# Patient Record
Sex: MALE | Race: BLACK OR AFRICAN AMERICAN | NOT HISPANIC OR LATINO | URBAN - METROPOLITAN AREA
[De-identification: names, ages, dates, MRNs, and addresses within clinical notes are randomized per-mention and may not be internally consistent; named-entity substitution may affect disease eponyms.]

---

## 2017-06-01 ENCOUNTER — OUTPATIENT (OUTPATIENT)
Dept: OUTPATIENT SERVICES | Facility: HOSPITAL | Age: 67
LOS: 1 days | End: 2017-06-01
Payer: MEDICAID

## 2017-06-12 DIAGNOSIS — R69 ILLNESS, UNSPECIFIED: ICD-10-CM

## 2017-08-01 PROCEDURE — G9001: CPT

## 2023-04-04 ENCOUNTER — INPATIENT (INPATIENT)
Facility: HOSPITAL | Age: 73
LOS: 13 days | Discharge: HOME | DRG: 252 | End: 2023-04-18
Attending: HOSPITALIST | Admitting: INTERNAL MEDICINE
Payer: MEDICARE

## 2023-04-04 VITALS
OXYGEN SATURATION: 96 % | HEART RATE: 106 BPM | DIASTOLIC BLOOD PRESSURE: 76 MMHG | RESPIRATION RATE: 16 BRPM | SYSTOLIC BLOOD PRESSURE: 134 MMHG | TEMPERATURE: 98 F

## 2023-04-04 DIAGNOSIS — K08.9 DISORDER OF TEETH AND SUPPORTING STRUCTURES, UNSPECIFIED: ICD-10-CM

## 2023-04-04 DIAGNOSIS — I73.9 PERIPHERAL VASCULAR DISEASE, UNSPECIFIED: ICD-10-CM

## 2023-04-04 DIAGNOSIS — I25.10 ATHEROSCLEROTIC HEART DISEASE OF NATIVE CORONARY ARTERY WITHOUT ANGINA PECTORIS: ICD-10-CM

## 2023-04-04 DIAGNOSIS — E11.9 TYPE 2 DIABETES MELLITUS WITHOUT COMPLICATIONS: ICD-10-CM

## 2023-04-04 DIAGNOSIS — Z95.5 PRESENCE OF CORONARY ANGIOPLASTY IMPLANT AND GRAFT: Chronic | ICD-10-CM

## 2023-04-04 DIAGNOSIS — Z29.9 ENCOUNTER FOR PROPHYLACTIC MEASURES, UNSPECIFIED: ICD-10-CM

## 2023-04-04 DIAGNOSIS — N18.4 CHRONIC KIDNEY DISEASE, STAGE 4 (SEVERE): ICD-10-CM

## 2023-04-04 DIAGNOSIS — M86.60 OTHER CHRONIC OSTEOMYELITIS, UNSPECIFIED SITE: ICD-10-CM

## 2023-04-04 DIAGNOSIS — I73.9 PERIPHERAL VASCULAR DISEASE, UNSPECIFIED: Chronic | ICD-10-CM

## 2023-04-04 LAB
ALBUMIN SERPL ELPH-MCNC: 3.2 G/DL — LOW (ref 3.3–5)
ALP SERPL-CCNC: 85 U/L — SIGNIFICANT CHANGE UP (ref 40–120)
ALT FLD-CCNC: <5 U/L — LOW (ref 10–45)
ANION GAP SERPL CALC-SCNC: 11 MMOL/L — SIGNIFICANT CHANGE UP (ref 5–17)
APPEARANCE UR: CLEAR — SIGNIFICANT CHANGE UP
APTT BLD: 28.3 SEC — SIGNIFICANT CHANGE UP (ref 27.5–35.5)
AST SERPL-CCNC: 12 U/L — SIGNIFICANT CHANGE UP (ref 10–40)
BACTERIA # UR AUTO: NEGATIVE — SIGNIFICANT CHANGE UP
BASOPHILS # BLD AUTO: 0.06 K/UL — SIGNIFICANT CHANGE UP (ref 0–0.2)
BASOPHILS NFR BLD AUTO: 0.5 % — SIGNIFICANT CHANGE UP (ref 0–2)
BILIRUB SERPL-MCNC: 0.3 MG/DL — SIGNIFICANT CHANGE UP (ref 0.2–1.2)
BILIRUB UR-MCNC: NEGATIVE — SIGNIFICANT CHANGE UP
BUN SERPL-MCNC: 45 MG/DL — HIGH (ref 7–23)
CALCIUM SERPL-MCNC: 8.8 MG/DL — SIGNIFICANT CHANGE UP (ref 8.4–10.5)
CHLORIDE SERPL-SCNC: 106 MMOL/L — SIGNIFICANT CHANGE UP (ref 96–108)
CO2 SERPL-SCNC: 21 MMOL/L — LOW (ref 22–31)
COLOR SPEC: SIGNIFICANT CHANGE UP
CREAT SERPL-MCNC: 2.64 MG/DL — HIGH (ref 0.5–1.3)
DIFF PNL FLD: NEGATIVE — SIGNIFICANT CHANGE UP
EGFR: 25 ML/MIN/1.73M2 — LOW
EOSINOPHIL # BLD AUTO: 0.11 K/UL — SIGNIFICANT CHANGE UP (ref 0–0.5)
EOSINOPHIL NFR BLD AUTO: 1 % — SIGNIFICANT CHANGE UP (ref 0–6)
EPI CELLS # UR: 0 /HPF — SIGNIFICANT CHANGE UP
GLUCOSE BLDC GLUCOMTR-MCNC: 156 MG/DL — HIGH (ref 70–99)
GLUCOSE BLDC GLUCOMTR-MCNC: 215 MG/DL — HIGH (ref 70–99)
GLUCOSE SERPL-MCNC: 114 MG/DL — HIGH (ref 70–99)
GLUCOSE UR QL: NEGATIVE — SIGNIFICANT CHANGE UP
HCT VFR BLD CALC: 26.1 % — LOW (ref 39–50)
HGB BLD-MCNC: 8.9 G/DL — LOW (ref 13–17)
IMM GRANULOCYTES NFR BLD AUTO: 0.5 % — SIGNIFICANT CHANGE UP (ref 0–0.9)
INR BLD: 1.25 RATIO — HIGH (ref 0.88–1.16)
KETONES UR-MCNC: NEGATIVE — SIGNIFICANT CHANGE UP
LEUKOCYTE ESTERASE UR-ACNC: NEGATIVE — SIGNIFICANT CHANGE UP
LYMPHOCYTES # BLD AUTO: 17.7 % — SIGNIFICANT CHANGE UP (ref 13–44)
LYMPHOCYTES # BLD AUTO: 2.01 K/UL — SIGNIFICANT CHANGE UP (ref 1–3.3)
MCHC RBC-ENTMCNC: 26.6 PG — LOW (ref 27–34)
MCHC RBC-ENTMCNC: 34.1 GM/DL — SIGNIFICANT CHANGE UP (ref 32–36)
MCV RBC AUTO: 78.1 FL — LOW (ref 80–100)
MONOCYTES # BLD AUTO: 0.92 K/UL — HIGH (ref 0–0.9)
MONOCYTES NFR BLD AUTO: 8.1 % — SIGNIFICANT CHANGE UP (ref 2–14)
NEUTROPHILS # BLD AUTO: 8.19 K/UL — HIGH (ref 1.8–7.4)
NEUTROPHILS NFR BLD AUTO: 72.2 % — SIGNIFICANT CHANGE UP (ref 43–77)
NITRITE UR-MCNC: NEGATIVE — SIGNIFICANT CHANGE UP
NRBC # BLD: 0 /100 WBCS — SIGNIFICANT CHANGE UP (ref 0–0)
PH UR: 6 — SIGNIFICANT CHANGE UP (ref 5–8)
PLATELET # BLD AUTO: 279 K/UL — SIGNIFICANT CHANGE UP (ref 150–400)
POTASSIUM SERPL-MCNC: 4.2 MMOL/L — SIGNIFICANT CHANGE UP (ref 3.5–5.3)
POTASSIUM SERPL-SCNC: 4.2 MMOL/L — SIGNIFICANT CHANGE UP (ref 3.5–5.3)
PROT SERPL-MCNC: 7.1 G/DL — SIGNIFICANT CHANGE UP (ref 6–8.3)
PROT UR-MCNC: ABNORMAL
PROTHROM AB SERPL-ACNC: 14.4 SEC — HIGH (ref 10.5–13.4)
RBC # BLD: 3.34 M/UL — LOW (ref 4.2–5.8)
RBC # FLD: 16.2 % — HIGH (ref 10.3–14.5)
RBC CASTS # UR COMP ASSIST: 1 /HPF — SIGNIFICANT CHANGE UP (ref 0–4)
SODIUM SERPL-SCNC: 138 MMOL/L — SIGNIFICANT CHANGE UP (ref 135–145)
SP GR SPEC: 1.01 — SIGNIFICANT CHANGE UP (ref 1.01–1.02)
UROBILINOGEN FLD QL: NEGATIVE — SIGNIFICANT CHANGE UP
WBC # BLD: 11.35 K/UL — HIGH (ref 3.8–10.5)
WBC # FLD AUTO: 11.35 K/UL — HIGH (ref 3.8–10.5)
WBC UR QL: 0 /HPF — SIGNIFICANT CHANGE UP (ref 0–5)

## 2023-04-04 PROCEDURE — 71045 X-RAY EXAM CHEST 1 VIEW: CPT | Mod: 26

## 2023-04-04 PROCEDURE — 99222 1ST HOSP IP/OBS MODERATE 55: CPT | Mod: 57

## 2023-04-04 PROCEDURE — 93010 ELECTROCARDIOGRAM REPORT: CPT

## 2023-04-04 PROCEDURE — 99223 1ST HOSP IP/OBS HIGH 75: CPT

## 2023-04-04 PROCEDURE — 73630 X-RAY EXAM OF FOOT: CPT | Mod: 26,50

## 2023-04-04 RX ORDER — INSULIN LISPRO 100/ML
VIAL (ML) SUBCUTANEOUS AT BEDTIME
Refills: 0 | Status: DISCONTINUED | OUTPATIENT
Start: 2023-04-04 | End: 2023-04-11

## 2023-04-04 RX ORDER — SODIUM CHLORIDE 9 MG/ML
1000 INJECTION, SOLUTION INTRAVENOUS
Refills: 0 | Status: DISCONTINUED | OUTPATIENT
Start: 2023-04-04 | End: 2023-04-12

## 2023-04-04 RX ORDER — CILOSTAZOL 100 MG/1
100 TABLET ORAL
Refills: 0 | Status: DISCONTINUED | OUTPATIENT
Start: 2023-04-04 | End: 2023-04-12

## 2023-04-04 RX ORDER — DEXTROSE 50 % IN WATER 50 %
15 SYRINGE (ML) INTRAVENOUS ONCE
Refills: 0 | Status: DISCONTINUED | OUTPATIENT
Start: 2023-04-04 | End: 2023-04-12

## 2023-04-04 RX ORDER — DEXTROSE 50 % IN WATER 50 %
12.5 SYRINGE (ML) INTRAVENOUS ONCE
Refills: 0 | Status: DISCONTINUED | OUTPATIENT
Start: 2023-04-04 | End: 2023-04-12

## 2023-04-04 RX ORDER — AZTREONAM 2 G
1000 VIAL (EA) INJECTION ONCE
Refills: 0 | Status: COMPLETED | OUTPATIENT
Start: 2023-04-04 | End: 2023-04-04

## 2023-04-04 RX ORDER — INSULIN LISPRO 100/ML
VIAL (ML) SUBCUTANEOUS
Refills: 0 | Status: DISCONTINUED | OUTPATIENT
Start: 2023-04-04 | End: 2023-04-11

## 2023-04-04 RX ORDER — ISOSORBIDE MONONITRATE 60 MG/1
30 TABLET, EXTENDED RELEASE ORAL DAILY
Refills: 0 | Status: DISCONTINUED | OUTPATIENT
Start: 2023-04-05 | End: 2023-04-12

## 2023-04-04 RX ORDER — GLUCAGON INJECTION, SOLUTION 0.5 MG/.1ML
1 INJECTION, SOLUTION SUBCUTANEOUS ONCE
Refills: 0 | Status: DISCONTINUED | OUTPATIENT
Start: 2023-04-04 | End: 2023-04-12

## 2023-04-04 RX ORDER — CLOPIDOGREL BISULFATE 75 MG/1
75 TABLET, FILM COATED ORAL DAILY
Refills: 0 | Status: DISCONTINUED | OUTPATIENT
Start: 2023-04-05 | End: 2023-04-12

## 2023-04-04 RX ORDER — AZTREONAM 2 G
VIAL (EA) INJECTION
Refills: 0 | Status: DISCONTINUED | OUTPATIENT
Start: 2023-04-04 | End: 2023-04-04

## 2023-04-04 RX ORDER — PANTOPRAZOLE SODIUM 20 MG/1
1 TABLET, DELAYED RELEASE ORAL
Refills: 0 | DISCHARGE

## 2023-04-04 RX ORDER — INSULIN LISPRO 100/ML
1 VIAL (ML) SUBCUTANEOUS ONCE
Refills: 0 | Status: COMPLETED | OUTPATIENT
Start: 2023-04-04 | End: 2023-04-04

## 2023-04-04 RX ORDER — HEPARIN SODIUM 5000 [USP'U]/ML
5000 INJECTION INTRAVENOUS; SUBCUTANEOUS EVERY 8 HOURS
Refills: 0 | Status: DISCONTINUED | OUTPATIENT
Start: 2023-04-04 | End: 2023-04-12

## 2023-04-04 RX ORDER — VANCOMYCIN HCL 1 G
1000 VIAL (EA) INTRAVENOUS ONCE
Refills: 0 | Status: COMPLETED | OUTPATIENT
Start: 2023-04-04 | End: 2023-04-04

## 2023-04-04 RX ORDER — ACETAMINOPHEN 500 MG
650 TABLET ORAL EVERY 6 HOURS
Refills: 0 | Status: DISCONTINUED | OUTPATIENT
Start: 2023-04-04 | End: 2023-04-12

## 2023-04-04 RX ORDER — DEXTROSE 50 % IN WATER 50 %
25 SYRINGE (ML) INTRAVENOUS ONCE
Refills: 0 | Status: DISCONTINUED | OUTPATIENT
Start: 2023-04-04 | End: 2023-04-12

## 2023-04-04 RX ORDER — NIFEDIPINE 30 MG
30 TABLET, EXTENDED RELEASE 24 HR ORAL DAILY
Refills: 0 | Status: DISCONTINUED | OUTPATIENT
Start: 2023-04-05 | End: 2023-04-11

## 2023-04-04 RX ADMIN — Medication 1 UNIT(S): at 17:22

## 2023-04-04 RX ADMIN — Medication 250 MILLIGRAM(S): at 23:52

## 2023-04-04 RX ADMIN — HEPARIN SODIUM 5000 UNIT(S): 5000 INJECTION INTRAVENOUS; SUBCUTANEOUS at 22:49

## 2023-04-04 RX ADMIN — Medication 50 MILLIGRAM(S): at 22:49

## 2023-04-04 NOTE — CONSULT NOTE ADULT - SUBJECTIVE AND OBJECTIVE BOX
73 y/o M--patient with a history of type 2 DM complicated with CKD4, PAD with unclear past stenting hx in the RLE, essential HTN, active cigarette use until one week ago,  transferred from [facility] 3/30/23 where he was initially admitted with mild cognitive impairment, with spouse reporting the patient had lost his R hallux toenail about a week prior to that admission and noted dark discoloration of the R hallux and 2nd toe with foul odor emanating from the R foot.   Patient also with L hallux discoloration.   Patient denies pain at rest.   Patient was seen by podiatry, vascular, nephrology, cardiology, and ID at [facility].   Patient initially on Azactam and IV vancomycin but transferred to doxycycline PO. Patient was evaluated at [facility] and was deemed to be a high operative risk by Cardiology and transferred to [place] per Dr. Munson for possible Vascular Cardiology intervention.      Patient afebrile, WBC 11.    Vascular Surgery consulted for evaluation of b/l foot wounds.    Vital Signs Last 24 Hrs  T(C): 36.6 (04 Apr 2023 16:50), Max: 36.6 (04 Apr 2023 16:50)  T(F): 97.8 (04 Apr 2023 16:50), Max: 97.8 (04 Apr 2023 16:50)  HR: 106 (04 Apr 2023 16:50) (106 - 106)  BP: 134/76 (04 Apr 2023 16:50) (134/76 - 134/76)  BP(mean): --  RR: 16 (04 Apr 2023 16:50) (16 - 16)  SpO2: 96% (04 Apr 2023 16:50) (96% - 96%)    Parameters below as of 04 Apr 2023 16:50  Patient On (Oxygen Delivery Method): room air    Physical exam  General: NAD  Cardiac: Regular rate  Respiratory: Nonlabored breathing  Abdominal Exam: soft, nondistended, nontender  Vascular: Palpable femoral pulses b/l, DP/PT signals on the RLE, PT signals on the LLE. LEFT hallux with ulcer, poor nail hygiene.  RIGHT hallux and 2nd toe with foul smelling odor with dry gangrene. R foot dorsal wound with eschar. no purulent drainage.    LABS:                            8.9    11.35 )-----------( 279      ( 04 Apr 2023 19:14 )             26.1     04-04    138  |  106  |  45<H>  ----------------------------<  114<H>  4.2   |  21<L>  |  2.64<H>    Ca    8.8      04 Apr 2023 19:14    TPro  7.1  /  Alb  3.2<L>  /  TBili  0.3  /  DBili  x   /  AST  12  /  ALT  <5<L>  /  AlkPhos  85  04-04    PT/INR - ( 04 Apr 2023 19:14 )   PT: 14.4 sec;   INR: 1.25 ratio         PTT - ( 04 Apr 2023 19:14 )  PTT:28.3 sec

## 2023-04-04 NOTE — H&P ADULT - NSHPSOURCEINFOTX_GEN_ALL_CORE
Patient's spouse, Dora Suman, [phone] updated, and patient's adult daughter, Teresita Suman, [phone] updated with patient's permission.

## 2023-04-04 NOTE — H&P ADULT - NSHPLABSRESULTS_GEN_ALL_CORE
EKG tracing interpreted by me with sinus tachycardia at 107.    Chest radiograph interpreted by me with no acute infiltrate or effusion.    WBC 11.3  72N    Hgb 8.9  (9.2 at [facility])    Platelets of 279K    INR 1.25    Random glucose of 114.  Cr 2.6  (Cr 2.4 at [facility])    K+ 4.2    Alb 3.2

## 2023-04-04 NOTE — CONSULT NOTE ADULT - SUBJECTIVE AND OBJECTIVE BOX
Patient is a 72y old  Male who presents with a chief complaint of Transferred from [facility] for possible vascular intervention. (04 Apr 2023 21:10)      HPI:  Transfer from [facility] above for this 73 y/o M--patient with a history of type 2 DM complicated with CKD4, PAD with past stenting RLE, essential HTN, apparently still active cigarette use until one week ago, UNVACCINATED against COVID-19, with initial admission to [facility] 3/30/23 in the setting of patient with mild cognitive impairment, with spouse reporting the patient had lost his RIGHT hallux toenail about a week prior to that admission and noted dark discoloration of the RIGHT hallux and 2nd toe with foul odor emanating from the RIGHT foot.   Patient also with LEFT hallux discoloration.   Patient denies foot or limb pain.   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   Patient initially on Azactam and IV vancomycin but transferred to doxycycline PO.   [place] at [facility] to be a high operative risk with evaluation by cardiology and transferred to [place] per Dr. Munson for possible Vascular Cardiology intervention.   (04 Apr 2023 19:23)      PAST MEDICAL & SURGICAL HISTORY:  Essential hypertension      Hyperlipidemia, unspecified hyperlipidemia type      CAD (coronary artery disease)      PVD (peripheral vascular disease) with claudication      Stented coronary artery      Type 2 diabetes mellitus      Stage 4 chronic kidney disease      COVID-19 vaccination refused      Stented coronary artery      PAD (peripheral artery disease)          MEDICATIONS  (STANDING):  aztreonam  IVPB 1000 milliGRAM(s) IV Intermittent once  cilostazol 100 milliGRAM(s) Oral two times a day  dextrose 5%. 1000 milliLiter(s) (100 mL/Hr) IV Continuous <Continuous>  dextrose 5%. 1000 milliLiter(s) (50 mL/Hr) IV Continuous <Continuous>  dextrose 50% Injectable 25 Gram(s) IV Push once  dextrose 50% Injectable 12.5 Gram(s) IV Push once  dextrose 50% Injectable 25 Gram(s) IV Push once  glucagon  Injectable 1 milliGRAM(s) IntraMuscular once  heparin   Injectable 5000 Unit(s) SubCutaneous every 8 hours  insulin lispro (ADMELOG) corrective regimen sliding scale   SubCutaneous three times a day before meals  insulin lispro (ADMELOG) corrective regimen sliding scale   SubCutaneous at bedtime    MEDICATIONS  (PRN):  acetaminophen     Tablet .. 650 milliGRAM(s) Oral every 6 hours PRN Temp greater or equal to 38C (100.4F), Mild Pain (1 - 3)  dextrose Oral Gel 15 Gram(s) Oral once PRN Blood Glucose LESS THAN 70 milliGRAM(s)/deciliter      Allergies    penicillin (Unknown)    Intolerances        VITALS:    Vital Signs Last 24 Hrs  T(C): 37.2 (04 Apr 2023 21:23), Max: 37.2 (04 Apr 2023 21:23)  T(F): 99 (04 Apr 2023 21:23), Max: 99 (04 Apr 2023 21:23)  HR: 107 (04 Apr 2023 21:23) (106 - 107)  BP: 156/73 (04 Apr 2023 21:23) (134/76 - 156/73)  BP(mean): --  RR: 16 (04 Apr 2023 21:23) (16 - 16)  SpO2: 99% (04 Apr 2023 21:23) (96% - 99%)    Parameters below as of 04 Apr 2023 21:23  Patient On (Oxygen Delivery Method): room air        LABS:                          8.9    11.35 )-----------( 279      ( 04 Apr 2023 19:14 )             26.1       04-04    138  |  106  |  45<H>  ----------------------------<  114<H>  4.2   |  21<L>  |  2.64<H>    Ca    8.8      04 Apr 2023 19:14    TPro  7.1  /  Alb  3.2<L>  /  TBili  0.3  /  DBili  x   /  AST  12  /  ALT  <5<L>  /  AlkPhos  85  04-04      CAPILLARY BLOOD GLUCOSE      POCT Blood Glucose.: 156 mg/dL (04 Apr 2023 22:01)  POCT Blood Glucose.: 215 mg/dL (04 Apr 2023 16:29)      PT/INR - ( 04 Apr 2023 19:14 )   PT: 14.4 sec;   INR: 1.25 ratio         PTT - ( 04 Apr 2023 19:14 )  PTT:28.3 sec    LOWER EXTREMITY PHYSICAL EXAM:    Vascular: DP/PT 0/4, B/L, CFT <3 seconds B/L, Temperature gradient warm to cool, B/L.   Neuro: Epicritic sensation diminished to the level of the toes, B/L.  Musculoskeletal/Ortho: Unremarkable.  Skin: Left foot distal lateral hallux wound to bone, fibrotic base with surrounding ischemic changes, mild malodor, no erythema, 2nd digit dry gangrene, ischemic changes to plantar digits 3 and 4. Right foot 2nd and 3rd digit dry gangrene, dorsal medial midfoot dry gangrene, no acute signs of infection.      RADIOLOGY & ADDITIONAL STUDIES:

## 2023-04-04 NOTE — H&P ADULT - NSICDXPASTMEDICALHX_GEN_ALL_CORE_FT
PAST MEDICAL HISTORY:  CAD (coronary artery disease)     Essential hypertension     Hyperlipidemia, unspecified hyperlipidemia type     PVD (peripheral vascular disease) with claudication     Stage 4 chronic kidney disease     Stented coronary artery     Type 2 diabetes mellitus      PAST MEDICAL HISTORY:  CAD (coronary artery disease)     COVID-19 vaccination refused     Essential hypertension     Hyperlipidemia, unspecified hyperlipidemia type     PVD (peripheral vascular disease) with claudication     Stage 4 chronic kidney disease     Stented coronary artery     Type 2 diabetes mellitus

## 2023-04-04 NOTE — CONSULT NOTE ADULT - ASSESSMENT
71 y/o M--patient with a history of type 2 DM complicated with CKD4, PAD with unclear past stenting hx in the RLE, essential HTN, active cigarette use until one week ago,  transferred from [facility] 3/30/23 where he was initially admitted with mild cognitive impairment, with spouse reporting the patient had lost his R hallux toenail about a week prior to that admission and noted dark discoloration of the R hallux and 2nd toe with foul odor emanating from the R foot. Patient afebrile, WBC 11. Vascular Surgery consulted for evaluation of b/l foot wounds.    Plan  - Please obtain b/l LE MILLIE/PVRs with toe pressures  - Please obtain records of outpatient hospital  - Please document Medicine and Cardiology optimization  - Podiatry, Nephrology and ID consults  - Rest of care per primary team   - Plan discussed with Vascular Surgery Fellow on behalf of Dr. Gloria    Vascular Surgery  [id number]

## 2023-04-04 NOTE — H&P ADULT - PROBLEM SELECTOR PLAN 1
See above.   Will resume patient's recent IV Azactam for now.   Would consider formal ID, podiatry evaluation in the AM.   Vascular Cardiology to review planned therapeutic options.

## 2023-04-04 NOTE — H&P ADULT - EXTREMITIES COMMENTS
LEFT hallux with ulcer, poor nail hygiene.  RIGHT hallux with foul smelling odor with dry gangrene with 2nd RIGHT toe.

## 2023-04-04 NOTE — H&P ADULT - NSHPREVIEWOFSYSTEMS_GEN_ALL_CORE
NO chest pain/pressure.  NO palpitations.  NO fever, no chills, no rigors.  NO cough, no dyspnoea.  NO wheezing.  NO abdominal pain, no red blood per rectum or melena.  NO back pain, no tearing back pain.    NO SI/HI>  No thyroid symptoms.  NO dysuria, no hematuria.  NO anorexia.  NO node swelling.    Patient is UNVACCINATED against COVID-19 (patient has refused to date).

## 2023-04-04 NOTE — CONSULT NOTE ADULT - ASSESSMENT
72M presents with bilateral feet dry gangrene and ischemic changes.  - Pt seen and evaluated.  - Afebrile, WBC 11.35, ESR/CRP ordered.  - Left foot distal lateral hallux wound to bone, fibrotic base with surrounding ischemic changes, mild malodor, no erythema, 2nd digit dry gangrene, ischemic changes to plantar digits 3 and 4. Right foot 2nd and 3rd digit dry gangrene, dorsal medial midfoot dry gangrene, no acute signs of infection.  - Left Foot Wound: Cultured.  - Recommend IV vancomycin and cefepime.  - BL Feet X-Rays: Ordered.  - MILLIE/PVR: Ordered.  - Vasc recs, appreciated.  - Pod Plan: Local wound care vs BL partial first and second ray resections pending BL feet x-rays and vasc recs.  - Please document medical/cardiac clearance for podiatric surgery under anesthesia.  - Discussed with attending.   72M presents with bilateral feet dry gangrene and ischemic changes.  - Pt seen and evaluated.  - Afebrile, WBC 11.35, ESR/CRP ordered.  - Left foot distal lateral hallux wound to bone, fibrotic base with surrounding ischemic changes, mild malodor, no erythema, 2nd digit dry gangrene, ischemic changes to plantar digits 3 and 4. Right foot 2nd and 3rd digit dry gangrene, dorsal medial midfoot dry gangrene, no acute signs of infection.  - Left Foot Wound: Cultured.  - Recommend IV vancomycin and cefepime.  - BL Feet X-Rays: Ordered.  - Ordered BL z-flows to be worn at all times while in bed.  - MILLIE/PVR: Ordered.  - Vasc recs, appreciated.  - Pod Plan: Local wound care vs BL partial first and second ray resections pending BL feet x-rays and vasc recs.  - Please document medical/cardiac clearance for podiatric surgery under anesthesia.  - Discussed with attending.   72M presents with bilateral feet dry gangrene and ischemic changes.  - Pt seen and evaluated.  - Afebrile, WBC 11.35, ESR/CRP ordered.  - Left foot distal lateral hallux wound to bone, fibrotic base with surrounding ischemic changes, mild malodor, no erythema, 2nd digit dry gangrene, ischemic changes to plantar digits 3 and 4. Right foot 2nd and 3rd digit dry gangrene, dorsal medial midfoot dry gangrene, no acute signs of infection.  - Left Foot Wound: Cultured.  - Recommend IV vancomycin and cefepime.  - BL Feet X-Rays: Ordered.  - Ordered BL z-flows to be worn at all times while in bed.  - MILLIE/PVR: Ordered.  - Vasc recs, appreciated.  - Pod Plan: Local wound care vs BL partial first and second ray resections pending BL feet x-rays and vasc recs.  - Please document medical/cardiac clearance for podiatric surgery under anesthesia.  - Discussed with attending.

## 2023-04-04 NOTE — H&P ADULT - ASSESSMENT
NIGHT HOSPITALIST:   NIGHT HOSPITALIST:  Transferred to [place] from [facility] as above with Dr. Munson to review possible Cardiology intervention.  The patient will need reevaluation by the associated subspecialists seen at [facility] (podiatry, ID, Nephrology) for review of patient's risks with contrast induced nephropathy and plans for intervention.   Concerned about patient's dry gangrene and coverage with oral doxycycline.   Will provide the IV Azactam for now pending ID review.   Will obtain B/L foot films.    Will assume aspiration risk with patient's poor dentition.    Will follow FS S/S for now.    Will temporarily HOLD the ACE and PPI for now pending review by Nephrology in the AM.    ON Cilostazol and Plavix for PAD.    Patient /family agree to pharmacologic DVT prophylaxis. NIGHT HOSPITALIST:  Transferred to [place] from [facility] as above with Dr. Munson to review possible Cardiology intervention.  The patient will need reevaluation by the associated subspecialists seen at [facility] (podiatry, ID, Nephrology) for review of patient's risks with contrast induced nephropathy and plans for intervention.   Concerned about patient's dry gangrene and coverage with oral doxycycline.   Will provide the IV Azactam for now pending ID review.   Will obtain B/L foot films.    Will assume aspiration risk with patient's poor dentition.    Will follow FS S/S for now.    Will temporarily HOLD the ACE and PPI for now pending review by Nephrology in the AM.    ON Cilostazol and Plavix for PAD.    Patient quit tobacco upon admission to [facility] but refuses pharmacologic withdrawal Rx.    Patient /family agree to pharmacologic DVT prophylaxis.

## 2023-04-04 NOTE — H&P ADULT - NSHPSOCIALHISTORY_GEN_ALL_CORE
No ethanol.   Quit 2-4 cigarettes daily one week ago, onset smoking teens.   Past marijuana and cocaine use per records at [facility].   , supportive spouse, adult daughter.

## 2023-04-04 NOTE — H&P ADULT - HISTORY OF PRESENT ILLNESS
NIGHT HOSPITALIST:   Patient UNKNOWN to me previously, assigned to me at this point by Flory Garsia MD of Cardiology to accept transfer from [facility] above for this 71 y/o M--patient with a history of type 2 DM complicated with CKD4, PAD with past stenting RLE, essential HTN, apparently still active cigarette use until one week ago, UNVACCINATED against COVID-19, with initial admission to [facility] 3/30/23 in the setting of patient with mild cognitive impairment, with spouse reporting the patient had lost his RIGHT hallux toenail about a week prior to that admission and noted dark discoloration of the RIGHT hallux and 2nd toe with foul odor emanating from the RIGHT foot.   Patient also with LEFT hallux discoloration.   Patient denies foot or limb pain.   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   Patient initially on Azactam and IV vancomycin but transferred to doxycycline PO.   [place] at [facility] to be a high operative risk with evaluation by cardiology and transferred to [place] per Dr. Munosn for possible Vascular Cardiology intervention.

## 2023-04-04 NOTE — PATIENT PROFILE ADULT - FALL HARM RISK - HARM RISK INTERVENTIONS

## 2023-04-04 NOTE — H&P ADULT - MENTAL STATUS
Alert, oriented to person/hospital.   Speech fluent.   Cognition grossly intact, subtle short term memory impairment.

## 2023-04-04 NOTE — H&P ADULT - PROBLEM SELECTOR PLAN 5
See above.  ON Pletal and Plavix.   Will defer to Vascular Cardiology further interventional considerations.

## 2023-04-04 NOTE — H&P ADULT - NSHPADDITIONALINFOADULT_GEN_ALL_CORE
NIGHT HOSPITALIST:    Patient/ spouse/daughter aware of course and agree with plan/care as above.  Given patient's comorbidities, patient's long term prognosis is guarded.   Emotional support provided to patient/ family.   Care reviewed with covering NP/PA for endorsement to the Daytime Provider.    Igor Lester MD  Available on [employer] Teams.

## 2023-04-04 NOTE — PATIENT PROFILE ADULT - FUNCTIONAL ASSESSMENT - BASIC MOBILITY 6.
3-calculated by average/Not able to assess (calculate score using [facility] averaging method)

## 2023-04-05 ENCOUNTER — TRANSCRIPTION ENCOUNTER (OUTPATIENT)
Age: 73
End: 2023-04-05

## 2023-04-05 DIAGNOSIS — I96 GANGRENE, NOT ELSEWHERE CLASSIFIED: ICD-10-CM

## 2023-04-05 DIAGNOSIS — R79.89 OTHER SPECIFIED ABNORMAL FINDINGS OF BLOOD CHEMISTRY: ICD-10-CM

## 2023-04-05 LAB
A1C WITH ESTIMATED AVERAGE GLUCOSE RESULT: 5.1 % — SIGNIFICANT CHANGE UP (ref 4–5.6)
ANION GAP SERPL CALC-SCNC: 10 MMOL/L — SIGNIFICANT CHANGE UP (ref 5–17)
BASOPHILS # BLD AUTO: 0.04 K/UL — SIGNIFICANT CHANGE UP (ref 0–0.2)
BASOPHILS NFR BLD AUTO: 0.4 % — SIGNIFICANT CHANGE UP (ref 0–2)
BUN SERPL-MCNC: 43 MG/DL — HIGH (ref 7–23)
CALCIUM SERPL-MCNC: 9.5 MG/DL — SIGNIFICANT CHANGE UP (ref 8.4–10.5)
CHLORIDE SERPL-SCNC: 106 MMOL/L — SIGNIFICANT CHANGE UP (ref 96–108)
CO2 SERPL-SCNC: 22 MMOL/L — SIGNIFICANT CHANGE UP (ref 22–31)
CREAT ?TM UR-MCNC: 91 MG/DL — SIGNIFICANT CHANGE UP
CREAT SERPL-MCNC: 2.43 MG/DL — HIGH (ref 0.5–1.3)
CRP SERPL-MCNC: 149 MG/L — HIGH (ref 0–4)
EGFR: 28 ML/MIN/1.73M2 — LOW
EOSINOPHIL # BLD AUTO: 0.11 K/UL — SIGNIFICANT CHANGE UP (ref 0–0.5)
EOSINOPHIL NFR BLD AUTO: 1 % — SIGNIFICANT CHANGE UP (ref 0–6)
ERYTHROCYTE [SEDIMENTATION RATE] IN BLOOD: 120 MM/HR — HIGH (ref 0–20)
ESTIMATED AVERAGE GLUCOSE: 100 MG/DL — SIGNIFICANT CHANGE UP (ref 68–114)
GLUCOSE BLDC GLUCOMTR-MCNC: 126 MG/DL — HIGH (ref 70–99)
GLUCOSE BLDC GLUCOMTR-MCNC: 127 MG/DL — HIGH (ref 70–99)
GLUCOSE BLDC GLUCOMTR-MCNC: 139 MG/DL — HIGH (ref 70–99)
GLUCOSE BLDC GLUCOMTR-MCNC: 140 MG/DL — HIGH (ref 70–99)
GLUCOSE BLDC GLUCOMTR-MCNC: 211 MG/DL — HIGH (ref 70–99)
GLUCOSE SERPL-MCNC: 117 MG/DL — HIGH (ref 70–99)
HCT VFR BLD CALC: 28.2 % — LOW (ref 39–50)
HCV AB S/CO SERPL IA: 0.16 S/CO — SIGNIFICANT CHANGE UP (ref 0–0.99)
HCV AB SERPL-IMP: SIGNIFICANT CHANGE UP
HGB BLD-MCNC: 9.3 G/DL — LOW (ref 13–17)
IMM GRANULOCYTES NFR BLD AUTO: 0.4 % — SIGNIFICANT CHANGE UP (ref 0–0.9)
LYMPHOCYTES # BLD AUTO: 1.73 K/UL — SIGNIFICANT CHANGE UP (ref 1–3.3)
LYMPHOCYTES # BLD AUTO: 16.1 % — SIGNIFICANT CHANGE UP (ref 13–44)
MCHC RBC-ENTMCNC: 26.2 PG — LOW (ref 27–34)
MCHC RBC-ENTMCNC: 33 GM/DL — SIGNIFICANT CHANGE UP (ref 32–36)
MCV RBC AUTO: 79.4 FL — LOW (ref 80–100)
MONOCYTES # BLD AUTO: 0.79 K/UL — SIGNIFICANT CHANGE UP (ref 0–0.9)
MONOCYTES NFR BLD AUTO: 7.4 % — SIGNIFICANT CHANGE UP (ref 2–14)
MRSA PCR RESULT.: SIGNIFICANT CHANGE UP
NEUTROPHILS # BLD AUTO: 8.03 K/UL — HIGH (ref 1.8–7.4)
NEUTROPHILS NFR BLD AUTO: 74.7 % — SIGNIFICANT CHANGE UP (ref 43–77)
NRBC # BLD: 0 /100 WBCS — SIGNIFICANT CHANGE UP (ref 0–0)
PLATELET # BLD AUTO: 275 K/UL — SIGNIFICANT CHANGE UP (ref 150–400)
POTASSIUM SERPL-MCNC: 4.3 MMOL/L — SIGNIFICANT CHANGE UP (ref 3.5–5.3)
POTASSIUM SERPL-SCNC: 4.3 MMOL/L — SIGNIFICANT CHANGE UP (ref 3.5–5.3)
PROT ?TM UR-MCNC: 16 MG/DL — HIGH (ref 0–12)
PROT/CREAT UR-RTO: 0.2 RATIO — SIGNIFICANT CHANGE UP (ref 0–0.2)
RBC # BLD: 3.55 M/UL — LOW (ref 4.2–5.8)
RBC # FLD: 16.1 % — HIGH (ref 10.3–14.5)
S AUREUS DNA NOSE QL NAA+PROBE: SIGNIFICANT CHANGE UP
SODIUM SERPL-SCNC: 138 MMOL/L — SIGNIFICANT CHANGE UP (ref 135–145)
WBC # BLD: 10.74 K/UL — HIGH (ref 3.8–10.5)
WBC # FLD AUTO: 10.74 K/UL — HIGH (ref 3.8–10.5)

## 2023-04-05 PROCEDURE — 99222 1ST HOSP IP/OBS MODERATE 55: CPT

## 2023-04-05 PROCEDURE — 99223 1ST HOSP IP/OBS HIGH 75: CPT

## 2023-04-05 PROCEDURE — 93925 LOWER EXTREMITY STUDY: CPT | Mod: 26

## 2023-04-05 PROCEDURE — 93923 UPR/LXTR ART STDY 3+ LVLS: CPT | Mod: 26

## 2023-04-05 PROCEDURE — 99232 SBSQ HOSP IP/OBS MODERATE 35: CPT | Mod: GC

## 2023-04-05 PROCEDURE — 99233 SBSQ HOSP IP/OBS HIGH 50: CPT

## 2023-04-05 RX ORDER — PANTOPRAZOLE SODIUM 20 MG/1
1 TABLET, DELAYED RELEASE ORAL
Refills: 0 | DISCHARGE

## 2023-04-05 RX ORDER — PANTOPRAZOLE SODIUM 20 MG/1
40 TABLET, DELAYED RELEASE ORAL
Refills: 0 | Status: DISCONTINUED | OUTPATIENT
Start: 2023-04-05 | End: 2023-04-12

## 2023-04-05 RX ORDER — METOPROLOL TARTRATE 50 MG
50 TABLET ORAL DAILY
Refills: 0 | Status: DISCONTINUED | OUTPATIENT
Start: 2023-04-05 | End: 2023-04-12

## 2023-04-05 RX ORDER — HEPARIN SODIUM 5000 [USP'U]/ML
5000 INJECTION INTRAVENOUS; SUBCUTANEOUS
Refills: 0 | DISCHARGE

## 2023-04-05 RX ORDER — ATORVASTATIN CALCIUM 80 MG/1
40 TABLET, FILM COATED ORAL AT BEDTIME
Refills: 0 | Status: DISCONTINUED | OUTPATIENT
Start: 2023-04-05 | End: 2023-04-12

## 2023-04-05 RX ORDER — CHOLECALCIFEROL (VITAMIN D3) 125 MCG
0 CAPSULE ORAL
Refills: 0 | DISCHARGE

## 2023-04-05 RX ORDER — NIFEDIPINE 30 MG
1 TABLET, EXTENDED RELEASE 24 HR ORAL
Refills: 0 | DISCHARGE

## 2023-04-05 RX ORDER — CHLORHEXIDINE GLUCONATE 213 G/1000ML
1 SOLUTION TOPICAL DAILY
Refills: 0 | Status: DISCONTINUED | OUTPATIENT
Start: 2023-04-05 | End: 2023-04-12

## 2023-04-05 RX ADMIN — Medication 50 MILLIGRAM(S): at 14:26

## 2023-04-05 RX ADMIN — HEPARIN SODIUM 5000 UNIT(S): 5000 INJECTION INTRAVENOUS; SUBCUTANEOUS at 06:47

## 2023-04-05 RX ADMIN — CILOSTAZOL 100 MILLIGRAM(S): 100 TABLET ORAL at 17:49

## 2023-04-05 RX ADMIN — CILOSTAZOL 100 MILLIGRAM(S): 100 TABLET ORAL at 06:47

## 2023-04-05 RX ADMIN — ISOSORBIDE MONONITRATE 30 MILLIGRAM(S): 60 TABLET, EXTENDED RELEASE ORAL at 11:07

## 2023-04-05 RX ADMIN — HEPARIN SODIUM 5000 UNIT(S): 5000 INJECTION INTRAVENOUS; SUBCUTANEOUS at 22:28

## 2023-04-05 RX ADMIN — ATORVASTATIN CALCIUM 40 MILLIGRAM(S): 80 TABLET, FILM COATED ORAL at 22:28

## 2023-04-05 RX ADMIN — HEPARIN SODIUM 5000 UNIT(S): 5000 INJECTION INTRAVENOUS; SUBCUTANEOUS at 13:35

## 2023-04-05 RX ADMIN — Medication 30 MILLIGRAM(S): at 06:47

## 2023-04-05 RX ADMIN — CLOPIDOGREL BISULFATE 75 MILLIGRAM(S): 75 TABLET, FILM COATED ORAL at 11:08

## 2023-04-05 NOTE — DIETITIAN INITIAL EVALUATION ADULT - CONTINUE CURRENT NUTRITION CARE PLAN
- Consider discontinue consistent CHO diet , A1C 5.1% and pt denies history T2DM  - consider discontinue no con potassium (WNL); obtain phosphorus labs and remove restriction if WNL; remove low Na and add DASH diet instead   - Monitor tolerance, PO intake, and adjust as needed.

## 2023-04-05 NOTE — DIETITIAN INITIAL EVALUATION ADULT - NSFNSPHYEXAMSKINFT_GEN_A_CORE
Pt states doesn't know his UBW but guessed ~160 pounds; dosing wt 147 pounds ?8% wt loss ?time frame   95% IBW ( pounds)  Skin: podiatry following; +wounds; no noted pressure injuries as per flowsheets

## 2023-04-05 NOTE — DISCHARGE NOTE PROVIDER - CARE PROVIDER_API CALL
Silvio Hernandez (DPM)  Surgery  [address], [place] [address]  Phone: [phone]  Fax: [phone]  Established Patient  Follow Up Time: 1 week    Bannazadeh, Mohsen (MD)  Surgery; Vascular Surgery  [address] [place] [id number]  Phone: [phone]  Fax: [phone]  Established Patient  Follow Up Time: 1 week

## 2023-04-05 NOTE — CONSULT NOTE ADULT - NS ATTEND AMEND GEN_ALL_CORE FT
CAD, PVD, DM, CKD stage 4, current smoker with CLTI, bilateral toe wounds.     Will follow-up noninvasive arterial testing to determine if there is a role for revascularization prior to debridement of wounds. GDMT and aggressive RF modification. Appreciate podiatry, ID and vascular surgery input.

## 2023-04-05 NOTE — DIETITIAN INITIAL EVALUATION ADULT - PERTINENT LABORATORY DATA
04-05    138  |  106  |  43<H>  ----------------------------<  117<H>  4.3   |  22  |  2.43<H>    Ca    9.5      05 Apr 2023 07:26    TPro  7.1  /  Alb  3.2<L>  /  TBili  0.3  /  DBili  x   /  AST  12  /  ALT  <5<L>  /  AlkPhos  85  04-04  POCT Blood Glucose.: 126 mg/dL (04-05-23 @ 08:21)  A1C with Estimated Average Glucose Result: 5.1 % (04-05-23 @ 07:26)   04-05    138  |  106  |  43<H>  ----------------------------<  117<H>  4.3   |  22  |  2.43<H>    Ca    9.5      05 Apr 2023 07:26    TPro  7.1  /  Alb  3.2<L>  /  TBili  0.3  /  DBili  x   /  AST  12  /  ALT  <5<L>  /  AlkPhos  85  04-04 04-05 @ 07:26: Na 138, BUN 43<H>, Cr 2.43<H>, <H>, K+ 4.3, Phos --, Mg --, Alk Phos --, ALT/SGPT --, AST/SGOT --, HbA1c --  04-04 @ 19:14: Na 138, BUN 45<H>, Cr 2.64<H>, <H>, K+ 4.2, Phos --, Mg --, Alk Phos 85, ALT/SGPT <5<L>, AST/SGOT 12, HbA1c --    POCT Blood Glucose.: 127 mg/dL (04-05-23 @ 13:33)  POCT Blood Glucose.: 126 mg/dL (04-05-23 @ 08:21)  POCT Blood Glucose.: 156 mg/dL (04-04-23 @ 22:01)  POCT Blood Glucose.: 215 mg/dL (04-04-23 @ 16:29)  POCT Blood Glucose.: 126 mg/dL (04-05-23 @ 08:21)    A1C with Estimated Average Glucose Result: 5.1 % (04-05-23 @ 07:26)

## 2023-04-05 NOTE — PROGRESS NOTE ADULT - ASSESSMENT
72M presents with bilateral foot dry gangrene and ischemic changes.  - Pt seen and evaluated.  - Afebrile, WBC 10.74  - Left foot distal lateral hallux wound to bone, fibronecrotic wound bed with surrounding ischemic changes, mild malodor, no erythema, 2nd digit ischemic changes to PIPJ, digits 3-4 distal plantar ischemic changes. Right hallux dry gangrene to the level of IPJ, R foot 2nd digit dry gangrene to MPJ, ischemic changes with hyperpigmentation noted to dorsal midfoot, R dorsomedial eschar to subQ, well adhered edges, R foot lateral 5th met eschar with well adhered edges, no boggyness, no wet conversion.   - Bilateral foot xray: b/l 1st distal phalanx OM, right foot 2nd digit distal phalanx OM   - Left foot wound culture pending  - Continue IV Vanco and Cefepime  - Ordered BL z-flows to be worn at all times while in bed.  - MILLIE/PVR: R MILLIE 0.50, L MILLIE 0.58, waveforms diminished @ calf b/l   - Vasc recs, appreciated.  - Pod plan local wound care vs BL partial first ray and second ray amputation pending vasc recs   - Please document medical/cardiac clearance for podiatric surgery under anesthesia.  - Seen with attending

## 2023-04-05 NOTE — DISCHARGE NOTE PROVIDER - HOSPITAL COURSE
71 y/o M--patient with a history of type 2 DM complicated with CKD4, PAD with past stenting RLE, essential HTN, apparently still active cigarette use until one week ago, UNVACCINATED against COVID-19, with initial admission to [facility] 3/30/23 in the setting of patient with mild cognitive impairment, with spouse reporting the patient had lost his RIGHT hallux toenail about a week prior to that admission and noted dark discoloration of the RIGHT hallux and 2nd toe with foul odor emanating from the RIGHT foot.   Patient also with LEFT hallux discoloration.   Patient denies foot or limb pain.   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   Patient initially on Azactam and IV vancomycin but transferred to doxycycline PO.   [place] at [facility] to be a high operative risk with evaluation by cardiology and transferred to [place] per Dr. Munson for possible Vascular Cardiology intervention.        ADMIT:      Problem/Plan - 1:  ·  Problem: Chronic osteomyelitis.   ·  Plan: S/p IV Azactam and IV Vancomycin x1 in ED  Vascular Surgery; recommends MILLIE/PVR  Podiatry; Recommend Vancomycin/Cefepime  ID Consult  Bilateral Foot X-Ray     Problem/Plan - 2:  ·  Problem: Type 2 diabetes mellitus.   ·  Plan: See above.   FS S/S for now.     Problem/Plan - 3:  ·  Problem: Stage 4 chronic kidney disease.   ·  Plan: Cr 2.64  ACE and PPI temporarily HELD>     Trend Cr  Would consider formal renal opinion in the AM.     Problem/Plan - 4:  ·  Problem: Poor dentition.   ·  Plan: See above.  Mechanical soft.   Would consider Dental evaluation in the AM.     Problem/Plan - 5:  ·  Problem: Peripheral artery disease.   ·  Plan: See above.  ON Pletal and Plavix.   Will defer to Vascular Cardiology further interventional considerations.     Problem/Plan - 6:  ·  Problem: CAD (coronary artery disease).      Problem/Plan - 7:  ·  Problem: Need for prophylactic measure.   ·  Plan: Patient/family agree to pharmacologic DVT prophylaxis. 73 yo male with PMH of CKD4, PAD s/p stent, CAD, HTN, T2DM, current smoker who p/w LE dry gangrene and osteomyelitis now s/p R SFA stent on 4/7 and L SFA stent on 4/10 now s/p b/l partial 1st and 2nd ray resection on 4/12/23 with course c/b fever on 4/13 now resolved current on IV abx       Fever.   ·  Plan: new fever to 101.7 on 4/13 AM with worsening leukocytosis to 17K. meets sepsis criteria.   - additional fever w/u unrevealing.   - 4/12 OR bone cx with GPCs, staph simulans, and citrobacter freundii -  continue to follow  - ID  f/u - switch to bactrim po for 6 weeks total since pod intervention. until 5/23      Peripheral artery disease.   ·  Plan: [facility] arterial duplex showing mod severe disease.   - s/p peripheral angio 4/7/2023 revealing  of SFA bilaterally s/p DCBA and stenting of R SFA  - patient with known PAD with CLI [place] Classification 6 with bilateral  of the SFAs,   - now s/p stent to L SFA on 4/10  - c/w DAPT, cilostazol.    Chronic osteomyelitis.   ·  Plan:   - wound cx grew Citrobacter, staph simulans & helcococcus yassine, and poryphyromonas  - now s/p bilateral partial first and second ray resection, closed, EBL 30cc, patient bled moderately on left but inadequately on right, high concern for viability, low concern for residual infection as bone quality at level of resection was hard and adequate with Podiatry on 4/12  - f/u clean bone blood cx obtained in OR 4/12    - cont with po bactrim until 5/23      Stage 4 chronic kidney disease.   ·  Plan: probably near  baseline.   - Cr overall stable/ slightly uptrending  - renal ultrasound with no hydro  - Nephrology consulted, recs appreciated  - renally dose meds to GFR, avoid nephrotoxic agents  - monitor labs.     Type 2 diabetes mellitus.   ·  Plan: HbA1c 5.5%  - c/w sliding scale  - c/w lantus 22 units qHS/pre-meal admelog to 10 units TID qAC  - will resume back on oral meds on d/c     CAD (coronary artery disease).   ·  Plan: - c/w ASA + Plavix  - c/w statin  - c/w metoprolol XL 50mg daily  - c/w imdur 30mg daily.      HTN (hypertension).   ·  Plan:   - c/w Toprol XL 50mg daily  - c/w imdur ER 30mg daily  - can stop nifedipine on d/c (new med during hosp stay) and resume back on lisinopril and hctz (home med)     PT rec [facility] but pt/wife prefers home.   CM working on DME and home PT.

## 2023-04-05 NOTE — DIETITIAN INITIAL EVALUATION ADULT - OTHER INFO
Home Medications:  aspirin 81 mg oral tablet: 1 tab(s) orally once a day (05 Apr 2023 12:19)  atorvastatin 40 mg oral tablet: 1 tab(s) orally once a day (at bedtime) (05 Apr 2023 12:11)  cilostazol 100 mg oral tablet: 1 tablet orally 2 times a day (however, pt taking once a day) (05 Apr 2023 12:46)  clopidogrel 75 mg oral tablet: 1 tab(s) orally once a day (04 Apr 2023 19:33)  ferrous sulfate 325 mg (65 mg elemental iron) oral tablet: 1 tab(s) orally once a day (05 Apr 2023 12:11)  glipiZIDE 10 mg oral tablet, extended release: 1 tab(s) orally once a day (05 Apr 2023 12:11)  hydroCHLOROthiazide 12.5 mg oral capsule: 1 cap(s) orally once a day (05 Apr 2023 12:18)  isosorbide mononitrate 30 mg oral tablet, extended release: 1 tab(s) orally once a day (in the morning) (04 Apr 2023 19:33)  Januvia 50 mg oral tablet: 1 tab(s) orally once a day (05 Apr 2023 12:11)  lisinopril 20 mg oral tablet: 1 orally once a day (04 Apr 2023 19:33)  metoprolol succinate 50 mg oral tablet, extended release: 1 tab(s) orally once a day (05 Apr 2023 12:12)  NexIUM 40 mg oral delayed release capsule: 1 cap(s) orally once a day (05 Apr 2023 12:11)

## 2023-04-05 NOTE — DISCHARGE NOTE PROVIDER - PROVIDER TOKENS
PROVIDER:[TOKEN:[[id number]],FOLLOWUP:[1 week],ESTABLISHEDPATIENT:[T]],PROVIDER:[TOKEN:[[id number]],FOLLOWUP:[1 week],ESTABLISHEDPATIENT:[T]]

## 2023-04-05 NOTE — DISCHARGE NOTE PROVIDER - NSDCMRMEDTOKEN_GEN_ALL_CORE_FT
cilostazol 100 mg oral tablet: 1 orally 2 times a day  clopidogrel 75 mg oral tablet: 1 tab(s) orally once a day  doxycycline hyclate 100 mg oral tablet: 1 orally 2 times a day  heparin 5000 units/mL injectable solution: 5,000 subcutaneously every 8 hours  isosorbide mononitrate 30 mg oral tablet, extended release: 1 tab(s) orally once a day (in the morning)  lisinopril 20 mg oral tablet: 1 orally once a day  NIFEdipine (Eqv-Adalat CC) 30 mg oral tablet, extended release: 1 orally once a day  pantoprazole 40 mg oral delayed release tablet: 1 orally once a day  vitamin D3:    aspirin 81 mg oral tablet: 1 tab(s) orally once a day  atorvastatin 40 mg oral tablet: 1 tab(s) orally once a day (at bedtime)  cilostazol 100 mg oral tablet: 1 tablet orally 2 times a day (however, pt taking once a day)  clopidogrel 75 mg oral tablet: 1 tab(s) orally once a day  ferrous sulfate 325 mg (65 mg elemental iron) oral tablet: 1 tab(s) orally once a day  glipiZIDE 10 mg oral tablet, extended release: 1 tab(s) orally once a day  hydroCHLOROthiazide 12.5 mg oral capsule: 1 cap(s) orally once a day  isosorbide mononitrate 30 mg oral tablet, extended release: 1 tab(s) orally once a day (in the morning)  Januvia 50 mg oral tablet: 1 tab(s) orally once a day  lisinopril 20 mg oral tablet: 1 orally once a day  metoprolol succinate 50 mg oral tablet, extended release: 1 tab(s) orally once a day  NexIUM 40 mg oral delayed release capsule: 1 cap(s) orally once a day   aspirin 81 mg oral tablet: 1 tab(s) orally once a day  atorvastatin 40 mg oral tablet: 1 tab(s) orally once a day (at bedtime)  cilostazol 100 mg oral tablet: 1 tablet orally 2 times a day (however, pt taking once a day)  clopidogrel 75 mg oral tablet: 1 tab(s) orally once a day  ferrous sulfate 325 mg (65 mg elemental iron) oral tablet: 1 tab(s) orally once a day  glipiZIDE 10 mg oral tablet, extended release: 1 tab(s) orally once a day  hydroCHLOROthiazide 12.5 mg oral capsule: 12.5 milligram(s) orally once a day  isosorbide mononitrate 30 mg oral tablet, extended release: 1 tab(s) orally once a day (in the morning)  Januvia 50 mg oral tablet: 1 tab(s) orally once a day  lisinopril 20 mg oral tablet: 1 orally once a day  metoprolol succinate 50 mg oral tablet, extended release: 1 tab(s) orally once a day  NexIUM 40 mg oral delayed release capsule: 1 cap(s) orally once a day  sulfamethoxazole-trimethoprim 400 mg-80 mg oral tablet: 1 tab(s) orally once a day

## 2023-04-05 NOTE — DIETITIAN INITIAL EVALUATION ADULT - NSFNSGIIOFT_GEN_A_CORE
- Pt denies nausea, vomiting, diarrhea, or constipation at this time   - Last BM:4/4 per pt; not currently ordered for bowel regimen

## 2023-04-05 NOTE — DIETITIAN NUTRITION RISK NOTIFICATION - TREATMENT: THE FOLLOWING DIET HAS BEEN RECOMMENDED
Diet, Minced and Moist:   DASH/TLC {Sodium & Cholesterol Restricted} (DASH)  No Concentrated Phosphorus  Supplement Feeding Modality:  Oral  Nepro Cans or Servings Per Day:  2       Frequency:  Daily (04-05-23 @ 15:05) [Pending Verification By Attending]  Diet, Minced and Moist:   Consistent Carbohydrate {Evening Snack} (CSTCHOSN)  DASH/TLC {Sodium & Cholesterol Restricted} (DASH)  No Concentrated Potassium  Low Sodium  No Concentrated Phosphorus (04-04-23 @ 19:43) [Active]

## 2023-04-05 NOTE — DIETITIAN INITIAL EVALUATION ADULT - REASON INDICATOR FOR ASSESSMENT
Consult received for nutrition support ("poor PO, DM2, catabolic state")  Information obtained from pt, electronic medical record

## 2023-04-05 NOTE — PROGRESS NOTE ADULT - ASSESSMENT
71 yo m with CKD4, PAD, stent, DM2, CAD, HTN, current smoker p/w LE dry gangrene and OM concerning for PVD.

## 2023-04-05 NOTE — DIETITIAN INITIAL EVALUATION ADULT - ADD RECOMMEND
- Malnutrition sticker placed, RD to follow-up as per protocol  - Will continue to monitor PO intake, weight, labs, skin, GI status, diet.   - Obtain A1C, Phos  - Nutrition care plan to remain consistent with pt goals of care  - RD remains available to review diet education and adjust diet recommendations as needed.  - Malnutrition sticker placed, RD to follow-up as per protocol  - Will continue to monitor PO intake, weight, labs, skin, GI status, diet.   - Obtain updated phosphorus labs   - Nutrition care plan to remain consistent with pt goals of care  - RD remains available to review diet education and adjust diet recommendations as needed.

## 2023-04-05 NOTE — CONSULT NOTE ADULT - SUBJECTIVE AND OBJECTIVE BOX
Vascular Cardiology Consult Note    DIRECT PROVIDER NUMBER: [phone]     CC: LE wounds / transferred from [facility] for possible vascular intervention    HPI:  73 y/o M Current Smoker w/ PMHX HTN, HLD, DM, CKD stage 4, CAD s/p prior PCI, PVD s/p prior PTA, who presented to [facility] with R hallux and 2nd toe dry gangrene and L hallux osteomyelitis, transferred to [facility] for further management. Vascular Cardiology consulted.     Allergies  penicillin (Unknown)    MEDICATIONS:  cilostazol 100 milliGRAM(s) Oral two times a day  clopidogrel Tablet 75 milliGRAM(s) Oral daily  heparin   Injectable 5000 Unit(s) SubCutaneous every 8 hours  isosorbide   mononitrate ER Tablet (IMDUR) 30 milliGRAM(s) Oral daily  NIFEdipine XL 30 milliGRAM(s) Oral daily  acetaminophen     Tablet .. 650 milliGRAM(s) Oral every 6 hours PRN  glucagon  Injectable 1 milliGRAM(s) IntraMuscular once  insulin lispro (ADMELOG) corrective regimen sliding scale   SubCutaneous three times a day before meals  insulin lispro (ADMELOG) corrective regimen sliding scale   SubCutaneous at bedtime  chlorhexidine 2% Cloths 1 Application(s) Topical daily  dextrose 5%. 1000 milliLiter(s) IV Continuous <Continuous>  dextrose 5%. 1000 milliLiter(s) IV Continuous <Continuous>      PAST MEDICAL & SURGICAL HISTORY:  Essential hypertension  Hyperlipidemia  CAD (coronary artery disease)  PVD (peripheral vascular disease) with claudication  Stented coronary artery  Type 2 diabetes mellitus  Stage 4 chronic kidney disease  COVID-19 vaccination refused  Stented coronary artery  PAD (peripheral artery disease)    FAMILY HISTORY:  Family history of essential hypertension    SOCIAL HISTORY:  unchanged    REVIEW OF SYSTEMS:  CONSTITUTIONAL: No fever  EYES: No eye pain  ENT:  No throat pain  NECK: No pain   RESPIRATORY: No SOB    CARDIOVASCULAR: No C/P  GASTROINTESTINAL: No abdominal pain  GENITOURINARY: No hematuria  NEUROLOGICAL: No memory loss  SKIN: LE wounds  LYMPH Nodes: No enlarged glands noted  ENDOCRINE: No heat or cold intolerance noted  MUSCULOSKELETAL: no LE edema  PSYCHIATRIC: No depression, anxiety  HEME/LYMPH: No  bleeding gums  ALLERGY AND IMMUNOLOGIC: No hives    [ x] All others negative	    PHYSICAL EXAM:  T(C): 36.8 (04-05-23 @ 08:15), Max: 37.2 (04-04-23 @ 21:23)  HR: 73 (04-05-23 @ 08:15) (73 - 107)  BP: 168/88 (04-05-23 @ 08:15) (134/76 - 168/88)  RR: 16 (04-05-23 @ 08:15) (16 - 18)  SpO2: 100% (04-05-23 @ 08:15) (96% - 100%)  I&O's Summary    04 Apr 2023 07:01  -  05 Apr 2023 07:00  --------------------------------------------------------  IN: 325 mL / OUT: 470 mL / NET: -145 mL    Appearance: NAD 	  HEENT:  NC/AT  Cardiovascular: RRR, S1 and S2  Respiratory: CTA B/L  Psychiatry:  AAO x 3  Gastrointestinal:  Soft, Non-tender, + BS	  Skin: No cyanosis	  Neurologic: No focal deficits noted  Extremities: no LE edema  Vascular Pulse Exam: Audible bilateral DP and PT signals  Foot Exam:  Left foot distal lateral hallux wound, 2nd digit dry gangrene  Right foot 2nd and 3rd digit dry gangrene, dorsal medial midfoot dry gangrene    LABS:	 	    CBC Full  -  ( 05 Apr 2023 07:26 )  WBC Count : 10.74 K/uL  Hemoglobin : 9.3 g/dL  Hematocrit : 28.2 %  Platelet Count - Automated : 275 K/uL  Mean Cell Volume : 79.4 fl  Mean Cell Hemoglobin : 26.2 pg  Mean Cell Hemoglobin Concentration : 33.0 gm/dL  Auto Neutrophil # : 8.03 K/uL  Auto Lymphocyte # : 1.73 K/uL  Auto Monocyte # : 0.79 K/uL  Auto Eosinophil # : 0.11 K/uL  Auto Basophil # : 0.04 K/uL  Auto Neutrophil % : 74.7 %  Auto Lymphocyte % : 16.1 %  Auto Monocyte % : 7.4 %  Auto Eosinophil % : 1.0 %  Auto Basophil % : 0.4 %    04-05    138  |  106  |  43<H>  ----------------------------<  117<H>  4.3   |  22  |  2.43<H>  04-04    138  |  106  |  45<H>  ----------------------------<  114<H>  4.2   |  21<L>  |  2.64<H>    Ca    9.5      05 Apr 2023 07:26  Ca    8.8      04 Apr 2023 19:14    TPro  7.1  /  Alb  3.2<L>  /  TBili  0.3  /  DBili  x   /  AST  12  /  ALT  <5<L>  /  AlkPhos  85  04-04    Assessment:  1. PAD with CLI [place] Classification 6  2. CAD s/p prior PCI  3. HTN  4. HLD  5. DM  6. CKD stage 4  7. Current Smoker      Plan:  1. Follow-up LE arterial duplex and MILLIE this morning.  2. Renal consultation due to CKD, in anticipation of upcoming peripheral angiography.  3. ID consultation for ABX in setting of gangrene and history of osteomyelitis.   4. Appreciate Podiatry and Vascular surgery.   5. Initiate Statin therapy if no contraindication.   6. Continue antiplatelet therapy.  7. Further plan pending vascular testing.        Thank you  VINAYAK Garcia, MS, [facility]  Vascular Cardiology Service    Please call with any questions:   DIRECT SERVICE NUMBER: [phone] Vascular Cardiology Consult Note    DIRECT PROVIDER NUMBER: [phone]     CC: LE wounds / transferred from [facility] for possible vascular intervention    HPI:  71 y/o M Current Smoker w/ PMHX HTN, HLD, DM, CKD stage 4, CAD s/p prior PCI, PVD s/p prior PTA, who presented to [facility] with R hallux and 2nd toe dry gangrene and L hallux osteomyelitis, transferred to [facility] for further management. Vascular Cardiology consulted.     Allergies  penicillin (Unknown)    MEDICATIONS:  cilostazol 100 milliGRAM(s) Oral two times a day  clopidogrel Tablet 75 milliGRAM(s) Oral daily  heparin   Injectable 5000 Unit(s) SubCutaneous every 8 hours  isosorbide   mononitrate ER Tablet (IMDUR) 30 milliGRAM(s) Oral daily  NIFEdipine XL 30 milliGRAM(s) Oral daily  acetaminophen     Tablet .. 650 milliGRAM(s) Oral every 6 hours PRN  glucagon  Injectable 1 milliGRAM(s) IntraMuscular once  insulin lispro (ADMELOG) corrective regimen sliding scale   SubCutaneous three times a day before meals  insulin lispro (ADMELOG) corrective regimen sliding scale   SubCutaneous at bedtime  chlorhexidine 2% Cloths 1 Application(s) Topical daily  dextrose 5%. 1000 milliLiter(s) IV Continuous <Continuous>  dextrose 5%. 1000 milliLiter(s) IV Continuous <Continuous>      PAST MEDICAL & SURGICAL HISTORY:  Essential hypertension  Hyperlipidemia  CAD (coronary artery disease)  PVD (peripheral vascular disease) with claudication  Stented coronary artery  Type 2 diabetes mellitus  Stage 4 chronic kidney disease  COVID-19 vaccination refused  Stented coronary artery  PAD (peripheral artery disease)    FAMILY HISTORY:  Family history of essential hypertension    SOCIAL HISTORY:  unchanged    REVIEW OF SYSTEMS:  CONSTITUTIONAL: No fever  EYES: No eye pain  ENT:  No throat pain  NECK: No pain   RESPIRATORY: No SOB    CARDIOVASCULAR: No C/P  GASTROINTESTINAL: No abdominal pain  GENITOURINARY: No hematuria  NEUROLOGICAL: No memory loss  SKIN: LE wounds  LYMPH Nodes: No enlarged glands noted  ENDOCRINE: No heat or cold intolerance noted  MUSCULOSKELETAL: no LE edema  PSYCHIATRIC: No depression, anxiety  HEME/LYMPH: No  bleeding gums  ALLERGY AND IMMUNOLOGIC: No hives    [ x] All others negative	    PHYSICAL EXAM:  T(C): 36.8 (04-05-23 @ 08:15), Max: 37.2 (04-04-23 @ 21:23)  HR: 73 (04-05-23 @ 08:15) (73 - 107)  BP: 168/88 (04-05-23 @ 08:15) (134/76 - 168/88)  RR: 16 (04-05-23 @ 08:15) (16 - 18)  SpO2: 100% (04-05-23 @ 08:15) (96% - 100%)  I&O's Summary    04 Apr 2023 07:01  -  05 Apr 2023 07:00  --------------------------------------------------------  IN: 325 mL / OUT: 470 mL / NET: -145 mL    Appearance: NAD 	  HEENT:  NC/AT  Cardiovascular: RRR, S1 and S2  Respiratory: CTA B/L  Psychiatry:  AAO x 3  Gastrointestinal:  Soft, Non-tender, + BS	  Skin: No cyanosis	  Neurologic: No focal deficits noted  Extremities: no LE edema  Vascular Pulse Exam: Audible bilateral DP and PT signals  Foot Exam:  Left foot distal lateral hallux wound, 2nd digit dry gangrene  Right foot 2nd and 3rd digit dry gangrene, dorsal medial midfoot dry gangrene    LABS:	 	    CBC Full  -  ( 05 Apr 2023 07:26 )  WBC Count : 10.74 K/uL  Hemoglobin : 9.3 g/dL  Hematocrit : 28.2 %  Platelet Count - Automated : 275 K/uL  Mean Cell Volume : 79.4 fl  Mean Cell Hemoglobin : 26.2 pg  Mean Cell Hemoglobin Concentration : 33.0 gm/dL  Auto Neutrophil # : 8.03 K/uL  Auto Lymphocyte # : 1.73 K/uL  Auto Monocyte # : 0.79 K/uL  Auto Eosinophil # : 0.11 K/uL  Auto Basophil # : 0.04 K/uL  Auto Neutrophil % : 74.7 %  Auto Lymphocyte % : 16.1 %  Auto Monocyte % : 7.4 %  Auto Eosinophil % : 1.0 %  Auto Basophil % : 0.4 %    04-05    138  |  106  |  43<H>  ----------------------------<  117<H>  4.3   |  22  |  2.43<H>  04-04    138  |  106  |  45<H>  ----------------------------<  114<H>  4.2   |  21<L>  |  2.64<H>    Ca    9.5      05 Apr 2023 07:26  Ca    8.8      04 Apr 2023 19:14    TPro  7.1  /  Alb  3.2<L>  /  TBili  0.3  /  DBili  x   /  AST  12  /  ALT  <5<L>  /  AlkPhos  85  04-04    Assessment:  1. PAD with CLI [place] Classification 6  2. CAD s/p prior PCI  3. HTN  4. HLD  5. DM  6. CKD stage 4  7. Current Smoker      Plan:  1. Follow-up LE arterial duplex and MILLIE this morning.  2. Renal consultation due to CKD, in anticipation of upcoming peripheral angiography.  3. IV ABX as per Podiatry/ID in setting of gangrene and history of osteomyelitis.   4. Appreciate Podiatry and Vascular surgery.   5. Initiate Statin therapy if no contraindication.   6. Continue antiplatelet therapy.  7. Further plan pending vascular testing.        Thank you  VINAYAK Garcia, MS, [facility]  Vascular Cardiology Service    Please call with any questions:   DIRECT SERVICE NUMBER: [phone]

## 2023-04-05 NOTE — PROGRESS NOTE ADULT - PROBLEM SELECTOR PLAN 3
See above.   ACE and PPI temporarily HELD>   Would consider formal renal opinion in the AM. Unclear baseline.   will cont to monitor overall trend , outpt, lytes, weight.   renal eval

## 2023-04-05 NOTE — CONSULT NOTE ADULT - SUBJECTIVE AND OBJECTIVE BOX
ADDIS ARAUZ 72y Male  MRN-[id number]    Patient is a 72y old  Male who presents with a chief complaint of Transferred from [facility] for possible vascular intervention. (2023 15:57)      HPI:  71 y/o M--patient with a history of type 2 DM complicated with CKD4, PAD with past stenting RLE, essential HTN, apparently still active cigarette use until one week ago, UNVACCINATED against COVID-19, with initial admission to [facility] 3/30/23 in the setting of patient with mild cognitive impairment, with spouse reporting the patient had lost his RIGHT hallux toenail about a week prior to that admission and noted dark discoloration of the RIGHT hallux and 2nd toe with foul odor emanating from the RIGHT foot.   Patient also with LEFT hallux discoloration.   Patient denies foot or limb pain.   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   Patient initially on Azactam and IV vancomycin but transferred to doxycycline PO.   [place] at [facility] to be a high operative risk with evaluation by cardiology and transferred to [place] per Dr. Munson for possible Vascular Cardiology intervention.   (2023 19:23)    ID called for gangrene     PAST MEDICAL & SURGICAL HISTORY:  Essential hypertension      Hyperlipidemia, unspecified hyperlipidemia type      CAD (coronary artery disease)      PVD (peripheral vascular disease) with claudication      Stented coronary artery      Type 2 diabetes mellitus      Stage 4 chronic kidney disease      COVID-19 vaccination refused      Stented coronary artery      PAD (peripheral artery disease)          Allergies    penicillin (Unknown)    Intolerances        ANTIMICROBIALS:      MEDICATIONS  (STANDING):  atorvastatin 40 milliGRAM(s) Oral at bedtime  chlorhexidine 2% Cloths 1 Application(s) Topical daily  cilostazol 100 milliGRAM(s) Oral two times a day  clopidogrel Tablet 75 milliGRAM(s) Oral daily  dextrose 5%. 1000 milliLiter(s) (100 mL/Hr) IV Continuous <Continuous>  dextrose 5%. 1000 milliLiter(s) (50 mL/Hr) IV Continuous <Continuous>  dextrose 50% Injectable 25 Gram(s) IV Push once  dextrose 50% Injectable 12.5 Gram(s) IV Push once  dextrose 50% Injectable 25 Gram(s) IV Push once  glucagon  Injectable 1 milliGRAM(s) IntraMuscular once  heparin   Injectable 5000 Unit(s) SubCutaneous every 8 hours  insulin lispro (ADMELOG) corrective regimen sliding scale   SubCutaneous three times a day before meals  insulin lispro (ADMELOG) corrective regimen sliding scale   SubCutaneous at bedtime  isosorbide   mononitrate ER Tablet (IMDUR) 30 milliGRAM(s) Oral daily  metoprolol succinate ER 50 milliGRAM(s) Oral daily  NIFEdipine XL 30 milliGRAM(s) Oral daily  pantoprazole    Tablet 40 milliGRAM(s) Oral before breakfast      Social History  Smoking:  Etoh:  Drug use:      FAMILY HISTORY:  Family history of essential hypertension        Vital Signs Last 24 Hrs  T(C): 36.7 (2023 11:00), Max: 37.2 (2023 21:23)  T(F): 98 (2023 11:00), Max: 99 (2023 21:23)  HR: 82 (2023 14:23) (73 - 107)  BP: 162/78 (2023 14:23) (137/84 - 168/88)  BP(mean): --  RR: 16 (2023 11:00) (16 - 18)  SpO2: 100% (2023 11:00) (99% - 100%)    Parameters below as of 2023 08:15  Patient On (Oxygen Delivery Method): room air        CBC Full  -  ( 2023 07:26 )  WBC Count : 10.74 K/uL  RBC Count : 3.55 M/uL  Hemoglobin : 9.3 g/dL  Hematocrit : 28.2 %  Platelet Count - Automated : 275 K/uL  Mean Cell Volume : 79.4 fl  Mean Cell Hemoglobin : 26.2 pg  Mean Cell Hemoglobin Concentration : 33.0 gm/dL  Auto Neutrophil # : 8.03 K/uL  Auto Lymphocyte # : 1.73 K/uL  Auto Monocyte # : 0.79 K/uL  Auto Eosinophil # : 0.11 K/uL  Auto Basophil # : 0.04 K/uL  Auto Neutrophil % : 74.7 %  Auto Lymphocyte % : 16.1 %  Auto Monocyte % : 7.4 %  Auto Eosinophil % : 1.0 %  Auto Basophil % : 0.4 %    04-05    138  |  106  |  43<H>  ----------------------------<  117<H>  4.3   |  22  |  2.43<H>    Ca    9.5      2023 07:26    TPro  7.1  /  Alb  3.2<L>  /  TBili  0.3  /  DBili  x   /  AST  12  /  ALT  <5<L>  /  AlkPhos  85  04-04    LIVER FUNCTIONS - ( 2023 19:14 )  Alb: 3.2 g/dL / Pro: 7.1 g/dL / ALK PHOS: 85 U/L / ALT: <5 U/L / AST: 12 U/L / GGT: x           Urinalysis Basic - ( 2023 23:03 )    Color: Light Yellow / Appearance: Clear / S.013 / pH: x  Gluc: x / Ketone: Negative  / Bili: Negative / Urobili: Negative   Blood: x / Protein: Trace / Nitrite: Negative   Leuk Esterase: Negative / RBC: 1 /hpf / WBC 0 /HPF   Sq Epi: x / Non Sq Epi: 0 /hpf / Bacteria: Negative        MICROBIOLOGY:            RADIOLOGY  < from: VA Duplex Lower Extrem Arterial, Bilat (23 @ 13:42) >  On the right, there is a flow-limiting stenosis of the common femoral  There is a nonimaged flow-limiting stenosis or occlusion of the right   superficial femoral artery in the distal thigh.    There is a segmental occlusion of the left superficial femoral artery in   the proximal thigh.  The left dorsal pedis artery is occluded.    < end of copied text >  < from: Xray Chest 1 View- PORTABLE-Urgent (Xray Chest 1 View- PORTABLE-Urgent .) (23 @ 19:45) >  Clear lungs.    < end of copied text >

## 2023-04-05 NOTE — CONSULT NOTE ADULT - MUSCULOSKELETAL COMMENTS
Left foot distal lateral hallux wound, 2nd digit dry gangrene     Right foot 2nd and 3rd digit dry gangrene, dorsal medial midfoot dry gangrene

## 2023-04-05 NOTE — PHARMACOTHERAPY INTERVENTION NOTE - COMMENTS
Performed medication reconciliation and home medication list updated in outpatient medication review. Medications verified with patient's wife and patient's pharmacy. Please refer to specifics in home medication list (outpatient medication review).    Home Medications  Aspirin 81 mg - one tablet once daily  Atorvastatin 40 mg - one tablet once daily at bedtime  Cilostazol 100 mg - one tablet twice daily (pt. takes once daily)  Clopidogrel 75 mg - one tablet once daily  Ferrous Sulfate 325 mg (65 mg elemental iron) - one tablet once daily  Glipizide 10 mg XR - 1 tablet once daily  Hydochlorothiazide 12.5 mg - one tablet once daily  Isosorbide Mononitrate 20 mg XR - 1 tablet once daily in the morning  Januvia 50 mg - one tablet once daily  Lisinopril 20 mg - one tablet once daily  Metoprolol Succinate 50 mg XR - one tablet once daily  Nexium 40 mg DR - one capsule once daily    Recommendations  1) If no contraindications, considering resuming patient's home medication metoprolol due to elevated HR and BP . Recent readings: HR 83 and /82.  2) If no contraindications, can also consider resuming patient's home medication atorvastatin and nexium. No renal dose adjustment necessary.  3) Can continue to hold home lisinopril and hydrochlorothiazide due to elevations in serum creatinine.    Kade Ervin  PharmD Candidate, Class of 2023    Jessie Alexandra  Clinical Pharmacy Specialist  Available on Teams. Performed medication reconciliation and home medication list updated in outpatient medication review. Medications verified with patient's wife and patient's pharmacy. Please refer to specifics in home medication list (outpatient medication review).    Home Medications  Aspirin 81 mg - one tablet once daily  Atorvastatin 40 mg - one tablet once daily at bedtime  Cilostazol 100 mg - one tablet twice daily (however, pt takes once daily)  Clopidogrel 75 mg - one tablet once daily  Ferrous Sulfate 325 mg (65 mg elemental iron) - one tablet once daily  Glipizide 10 mg XR - 1 tablet once daily  Hydochlorothiazide 12.5 mg - one tablet once daily  Isosorbide Mononitrate 20 mg XR - 1 tablet once daily in the morning  Januvia 50 mg - one tablet once daily  Lisinopril 20 mg - one tablet once daily  Metoprolol Succinate 50 mg XR - one tablet once daily  Nexium 40 mg DR - one capsule once daily    Recommendations  1) If no contraindications, considering resuming patient's home medication metoprolol due to elevated HR and BP . Recent readings: HR 83 and /82.  2) If no contraindications, can also consider resuming patient's home medication atorvastatin and protonix (formulary alternative to nexium). No renal dose adjustment necessary.  Home lisinopril and hydrochlorothiazide are on hold due to elevations in serum creatinine.    Kade Ervin  PharmD Candidate, Class of 2023    Jessie Alexandra  Clinical Pharmacy Specialist  Available on Teams.

## 2023-04-05 NOTE — PROGRESS NOTE ADULT - PROBLEM SELECTOR PLAN 2
VA arterial duplex ordered overnight.  further eval by vasc/vasc-card.   Pod plans for interventioin pending vasc w/u  ID/card/renal clearance prior to intervention.

## 2023-04-05 NOTE — PROGRESS NOTE ADULT - SUBJECTIVE AND OBJECTIVE BOX
[facility] Division of Hospital Medicine  Mary Saravia MD  Pager (M-F, 6U-1K): [phone]  Other Times:  [phone]    Patient is a 72y old  Male who presents with a chief complaint of Atherosclerosis of native coronary artery without angina pectoris     (2023 11:29)      SUBJECTIVE / OVERNIGHT EVENTS:  denies any specific complaints. very emotional/tearful about being in hospital. reassured.   no acute overnight issues     ADDITIONAL REVIEW OF SYSTEMS: otherwise neg    MEDICATIONS  (STANDING):  atorvastatin 40 milliGRAM(s) Oral at bedtime  chlorhexidine 2% Cloths 1 Application(s) Topical daily  cilostazol 100 milliGRAM(s) Oral two times a day  clopidogrel Tablet 75 milliGRAM(s) Oral daily  dextrose 5%. 1000 milliLiter(s) (100 mL/Hr) IV Continuous <Continuous>  dextrose 5%. 1000 milliLiter(s) (50 mL/Hr) IV Continuous <Continuous>  dextrose 50% Injectable 25 Gram(s) IV Push once  dextrose 50% Injectable 12.5 Gram(s) IV Push once  dextrose 50% Injectable 25 Gram(s) IV Push once  glucagon  Injectable 1 milliGRAM(s) IntraMuscular once  heparin   Injectable 5000 Unit(s) SubCutaneous every 8 hours  insulin lispro (ADMELOG) corrective regimen sliding scale   SubCutaneous three times a day before meals  insulin lispro (ADMELOG) corrective regimen sliding scale   SubCutaneous at bedtime  isosorbide   mononitrate ER Tablet (IMDUR) 30 milliGRAM(s) Oral daily  metoprolol succinate ER 50 milliGRAM(s) Oral daily  NIFEdipine XL 30 milliGRAM(s) Oral daily  pantoprazole    Tablet 40 milliGRAM(s) Oral before breakfast    MEDICATIONS  (PRN):  acetaminophen     Tablet .. 650 milliGRAM(s) Oral every 6 hours PRN Temp greater or equal to 38C (100.4F), Mild Pain (1 - 3)  dextrose Oral Gel 15 Gram(s) Oral once PRN Blood Glucose LESS THAN 70 milliGRAM(s)/deciliter      CAPILLARY BLOOD GLUCOSE      POCT Blood Glucose.: 127 mg/dL (2023 13:33)  POCT Blood Glucose.: 126 mg/dL (2023 08:21)  POCT Blood Glucose.: 156 mg/dL (2023 22:01)  POCT Blood Glucose.: 215 mg/dL (2023 16:29)    I&O's Summary    2023 07:01  -  2023 07:00  --------------------------------------------------------  IN: 325 mL / OUT: 470 mL / NET: -145 mL        PHYSICAL EXAM:  Vital Signs Last 24 Hrs  T(C): 36.7 (2023 11:00), Max: 37.2 (2023 21:23)  T(F): 98 (2023 11:00), Max: 99 (2023 21:23)  HR: 83 (2023 11:00) (73 - 107)  BP: 166/82 (2023 11:00) (134/76 - 168/88)  BP(mean): --  RR: 16 (2023 11:00) (16 - 18)  SpO2: 100% (2023 11:00) (96% - 100%)    Parameters below as of 2023 08:15  Patient On (Oxygen Delivery Method): room air        CONSTITUTIONAL: NAD   EYES:  conjunctiva and sclera clear  ENMT: Moist oral mucosa  NECK: Supple, no palpable masses; no JVD  RESPIRATORY: Normal respiratory effort; lungs are clear to auscultation bilaterally  CARDIOVASCULAR: Regular rate and rhythm, normal S1 and S2, no murmur/rub/gallop;  lower extremity edema  ABDOMEN: Nontender to palpation, normoactive bowel sounds, no rebound/guarding  MUSCULOSKELETAL:  no clubbing or cyanosis of digits; no joint swelling or tenderness to palpation  PSYCH: A+O to person, place, and time; affect appropriate  SKIN: bilateral foot wrapped in gauze c/d/i . malorderous, tender to touch     LABS:                        9.3    10.74 )-----------( 275      ( 2023 07:26 )             28.2         138  |  106  |  43<H>  ----------------------------<  117<H>  4.3   |  22  |  2.43<H>    Ca    9.5      2023 07:26    TPro  7.1  /  Alb  3.2<L>  /  TBili  0.3  /  DBili  x   /  AST  12  /  ALT  <5<L>  /  AlkPhos  85  -    PT/INR - ( 2023 19:14 )   PT: 14.4 sec;   INR: 1.25 ratio         PTT - ( 2023 19:14 )  PTT:28.3 sec      Urinalysis Basic - ( 2023 23:03 )    Color: Light Yellow / Appearance: Clear / S.013 / pH: x  Gluc: x / Ketone: Negative  / Bili: Negative / Urobili: Negative   Blood: x / Protein: Trace / Nitrite: Negative   Leuk Esterase: Negative / RBC: 1 /hpf / WBC 0 /HPF   Sq Epi: x / Non Sq Epi: 0 /hpf / Bacteria: Negative          RADIOLOGY & ADDITIONAL TESTS:  Results Reviewed:   Imaging Personally Reviewed:  Electrocardiogram Personally Reviewed:    COORDINATION OF CARE:  Care Discussed with Consultants/Other Providers [Y/N]:  Prior or Outpatient Records Reviewed [Y/N]:

## 2023-04-05 NOTE — DIETITIAN INITIAL EVALUATION ADULT - NSFNSNUTRCHEWSWALLOWFT_GEN_A_CORE
pt ordered for minced and moist at this time per chart- Defer diet/fluid consistencies to medical team/SLP recommendations.  Consider formal swallowing evaluation with speech pathologist PRN

## 2023-04-05 NOTE — DISCHARGE NOTE PROVIDER - NSDCFUSCHEDAPPT_GEN_ALL_CORE_FT
Bannazadeh, Mohsen  [facility]  VASCULAR [address]  Scheduled Appointment: 05/04/2023    [facility]  VASCULAR [address]  Scheduled Appointment: 05/04/2023

## 2023-04-05 NOTE — DIETITIAN INITIAL EVALUATION ADULT - NS FNS DIET ORDER
Diet, Minced and Moist:   Consistent Carbohydrate {Evening Snack} (CSTCHOSN)  DASH/TLC {Sodium & Cholesterol Restricted} (DASH)  No Concentrated Potassium  Low Sodium  No Concentrated Phosphorus (04-04-23 @ 19:43)

## 2023-04-05 NOTE — PROGRESS NOTE ADULT - SUBJECTIVE AND OBJECTIVE BOX
Patient is a 72y old  Male who presents with a chief complaint of Transferred from [facility] for possible vascular intervention. (2023 16:56)       INTERVAL HPI/OVERNIGHT EVENTS:  Patient seen and evaluated at bedside.  Pt is resting comfortable in NAD. Denies N/V/F/C.  Pain rated at X/10    Allergies    penicillin (Unknown)    Intolerances        Vital Signs Last 24 Hrs  T(C): 36.7 (2023 11:00), Max: 37.2 (2023 21:23)  T(F): 98 (2023 11:00), Max: 99 (2023 21:23)  HR: 82 (2023 14:23) (73 - 107)  BP: 162/78 (2023 14:23) (137/84 - 168/88)  BP(mean): --  RR: 16 (2023 11:00) (16 - 18)  SpO2: 100% (2023 11:00) (99% - 100%)    Parameters below as of 2023 08:15  Patient On (Oxygen Delivery Method): room air        LABS:                        9.3    10.74 )-----------( 275      ( 2023 07:26 )             28.2     04-05    138  |  106  |  43<H>  ----------------------------<  117<H>  4.3   |  22  |  2.43<H>    Ca    9.5      2023 07:26    TPro  7.1  /  Alb  3.2<L>  /  TBili  0.3  /  DBili  x   /  AST  12  /  ALT  <5<L>  /  AlkPhos  85  04-04    PT/INR - ( 2023 19:14 )   PT: 14.4 sec;   INR: 1.25 ratio         PTT - ( 2023 19:14 )  PTT:28.3 sec  Urinalysis Basic - ( 2023 23:03 )    Color: Light Yellow / Appearance: Clear / S.013 / pH: x  Gluc: x / Ketone: Negative  / Bili: Negative / Urobili: Negative   Blood: x / Protein: Trace / Nitrite: Negative   Leuk Esterase: Negative / RBC: 1 /hpf / WBC 0 /HPF   Sq Epi: x / Non Sq Epi: 0 /hpf / Bacteria: Negative      CAPILLARY BLOOD GLUCOSE      POCT Blood Glucose.: 139 mg/dL (2023 16:38)  POCT Blood Glucose.: 140 mg/dL (2023 16:28)  POCT Blood Glucose.: 127 mg/dL (2023 13:33)  POCT Blood Glucose.: 126 mg/dL (2023 08:21)  POCT Blood Glucose.: 156 mg/dL (2023 22:01)      Lower Extremity Physical Exam:  Vascular: DP/PT 0/4, B/L, CFT <3 seconds B/L, Temperature gradient warm to cool, B/L.   Neuro: Epicritic sensation diminished to the level of the toes, B/L.  Musculoskeletal/Ortho: Unremarkable.  Skin: Left foot distal lateral hallux wound to bone, fibronecrotic wound bed with surrounding ischemic changes, mild malodor, no erythema, 2nd digit ischemic changes to PIPJ, digits 3-4 distal plantar ischemic changes. Right hallux dry gangrene to the level of IPJ, R foot 2nd digit dry gangrene to MPJ, ischemic changes with hyperpigmentation noted to dorsal midfoot, R dorsomedial eschar to subQ, well adhered edges, R foot lateral 5th met eschar with well adhered edges, no boggyness, no wet conversion.     RADIOLOGY & ADDITIONAL TESTS:

## 2023-04-05 NOTE — DIETITIAN INITIAL EVALUATION ADULT - PERTINENT MEDS FT
MEDICATIONS  (STANDING):  chlorhexidine 2% Cloths 1 Application(s) Topical daily  cilostazol 100 milliGRAM(s) Oral two times a day  clopidogrel Tablet 75 milliGRAM(s) Oral daily  dextrose 5%. 1000 milliLiter(s) (100 mL/Hr) IV Continuous <Continuous>  dextrose 5%. 1000 milliLiter(s) (50 mL/Hr) IV Continuous <Continuous>  dextrose 50% Injectable 25 Gram(s) IV Push once  dextrose 50% Injectable 12.5 Gram(s) IV Push once  dextrose 50% Injectable 25 Gram(s) IV Push once  glucagon  Injectable 1 milliGRAM(s) IntraMuscular once  heparin   Injectable 5000 Unit(s) SubCutaneous every 8 hours  insulin lispro (ADMELOG) corrective regimen sliding scale   SubCutaneous three times a day before meals  insulin lispro (ADMELOG) corrective regimen sliding scale   SubCutaneous at bedtime  isosorbide   mononitrate ER Tablet (IMDUR) 30 milliGRAM(s) Oral daily  NIFEdipine XL 30 milliGRAM(s) Oral daily    MEDICATIONS  (PRN):  acetaminophen     Tablet .. 650 milliGRAM(s) Oral every 6 hours PRN Temp greater or equal to 38C (100.4F), Mild Pain (1 - 3)  dextrose Oral Gel 15 Gram(s) Oral once PRN Blood Glucose LESS THAN 70 milliGRAM(s)/deciliter

## 2023-04-05 NOTE — CONSULT NOTE ADULT - CONSULT REQUESTED DATE/TIME
05-Apr-2023
04-Apr-2023 22:21
05-Apr-2023 15:57
04-Apr-2023 21:11
05-Apr-2023 15:52
05-Apr-2023 16:56

## 2023-04-05 NOTE — PROGRESS NOTE ADULT - PROBLEM SELECTOR PLAN 1
Pt with stay at OSH on iV abx seen by pod and ID there  Will d/w ID here for recommendation  wound cultures sent by pod. will follow  no clinical signs and symptoms of overt systemic infection.   Plan for surg intervention as per pod and vasc.

## 2023-04-05 NOTE — PROGRESS NOTE ADULT - PROBLEM SELECTOR PLAN 5
Cont with plavix and pletal.   not current on statin. unclear why  will start unless contraindication Cont with plavix and pletal.   clarifying home meds. resume statin, BB   Nitrate.

## 2023-04-05 NOTE — CONSULT NOTE ADULT - ATTENDING COMMENTS
bilateral LE CLI  will perform right then left angio after medical optimization
Agree w amended fellow's note:  Scr 2.6  Unclear CKD

## 2023-04-05 NOTE — DISCHARGE NOTE PROVIDER - NSDCCPCAREPLAN_GEN_ALL_CORE_FT
PRINCIPAL DISCHARGE DIAGNOSIS  Diagnosis: Peripheral artery disease  Assessment and Plan of Treatment: You had interventions on both legs to improve circulations with vasc surg. Please monitor for any worsening pain, discoloration or drainage.  Please follow up with vasc surg as outpt.      SECONDARY DISCHARGE DIAGNOSES  Diagnosis: Chronic osteomyelitis  Assessment and Plan of Treatment: You had resection of infected bones with podiatry.  Please cont with dressing changes with home care and monitor   Please follow up with Pod team as outpt.   Please cont with abx as directed    Diagnosis: Stage 4 chronic kidney disease  Assessment and Plan of Treatment: You were noted to have abnormal kidney function   Please follow up with your PMD/kidney doctors for bloodwork/monitor  Please cont all meds as directed    Diagnosis: Type 2 diabetes mellitus  Assessment and Plan of Treatment: Cont with your meds as directed  please monitor your BS and f/u with PMD/endocrine doctors as outpt    Diagnosis: HTN (hypertension)  Assessment and Plan of Treatment: please cont all BP meds as directed and follow up with PMD for BP check

## 2023-04-05 NOTE — CONSULT NOTE ADULT - ASSESSMENT
71 y/o M--patient with a history of type 2 DM complicated with CKD4, PAD with past stenting RLE, essential HTN, apparently still active cigarette use until one week ago, UNVACCINATED against COVID-19, with initial admission to [facility] 3/30/23 in the setting of patient with mild cognitive impairment, with spouse reporting the patient had lost his RIGHT hallux toenail about a week prior to that admission and noted dark discoloration of the RIGHT hallux and 2nd toe with foul odor emanating from the RIGHT foot+ left foot gangrene    Miguel Galicia  Attending Physician   Division of Infectious Disease  Office #[phone]  Available on Microsoft Teams also  After 5pm/weekend or no response, call #[phone]

## 2023-04-05 NOTE — DIETITIAN INITIAL EVALUATION ADULT - ORAL INTAKE PTA/DIET HISTORY
pt reports had reduced PO intake and reduced appetite for few days; reports he lost wt in the last few months "few pounds"  Reports not following specific diet and denies history T2DM; per chart T2DM however noted A1C with Estimated Average Glucose Result: 5.1 % (04-05-23 @ 07:26)  confirmed no known food allergies/ food intolerances

## 2023-04-05 NOTE — CONSULT NOTE ADULT - SUBJECTIVE AND OBJECTIVE BOX
Cardiovascular Disease Initial Evaluation  Date of service: 04-05-23 @ 15:58    CHIEF COMPLAINT: Foot pain    HISTORY OF PRESENT ILLNESS:  73 y/o M--patient with a history of type 2 DM complicated with CKD4, PAD with unclear past stenting hx in the RLE, essential HTN, active cigarette use until one week ago,  transferred from [facility] 3/30/23 where he was initially admitted with mild cognitive impairment, with spouse reporting the patient had lost his R hallux toenail about a week prior to that admission and noted dark discoloration of the R hallux and 2nd toe with foul odor emanating from the R foot.   Patient also with L hallux discoloration.   Patient denies pain at rest.   Patient was seen by podiatry, vascular, nephrology, cardiology, and ID at [facility].   Patient initially on Azactam and IV vancomycin but transferred to doxycycline PO. Patient was evaluated at [facility] and was deemed to be a high operative risk by Cardiology and transferred to [place] per Dr. Munson for possible Vascular Cardiology intervention. Pt denies chest pain or SOB.       Allergies    penicillin (Unknown)    Intolerances    	    MEDICATIONS:  cilostazol 100 milliGRAM(s) Oral two times a day  clopidogrel Tablet 75 milliGRAM(s) Oral daily  heparin   Injectable 5000 Unit(s) SubCutaneous every 8 hours  isosorbide   mononitrate ER Tablet (IMDUR) 30 milliGRAM(s) Oral daily  metoprolol succinate ER 50 milliGRAM(s) Oral daily  NIFEdipine XL 30 milliGRAM(s) Oral daily        acetaminophen     Tablet .. 650 milliGRAM(s) Oral every 6 hours PRN    pantoprazole    Tablet 40 milliGRAM(s) Oral before breakfast    atorvastatin 40 milliGRAM(s) Oral at bedtime  dextrose 50% Injectable 25 Gram(s) IV Push once  dextrose 50% Injectable 12.5 Gram(s) IV Push once  dextrose 50% Injectable 25 Gram(s) IV Push once  dextrose Oral Gel 15 Gram(s) Oral once PRN  glucagon  Injectable 1 milliGRAM(s) IntraMuscular once  insulin lispro (ADMELOG) corrective regimen sliding scale   SubCutaneous three times a day before meals  insulin lispro (ADMELOG) corrective regimen sliding scale   SubCutaneous at bedtime    chlorhexidine 2% Cloths 1 Application(s) Topical daily  dextrose 5%. 1000 milliLiter(s) IV Continuous <Continuous>  dextrose 5%. 1000 milliLiter(s) IV Continuous <Continuous>      PAST MEDICAL & SURGICAL HISTORY:  Essential hypertension      Hyperlipidemia, unspecified hyperlipidemia type      CAD (coronary artery disease)      PVD (peripheral vascular disease) with claudication      Stented coronary artery      Type 2 diabetes mellitus      Stage 4 chronic kidney disease      COVID-19 vaccination refused      Stented coronary artery      PAD (peripheral artery disease)          FAMILY HISTORY:  Family history of essential hypertension        SOCIAL HISTORY:    The patient is a nonsmoker       REVIEW OF SYSTEMS:  See HPI, otherwise complete 14 point review of systems negative    [ x] All others negative	  [ ] Unable to obtain    PHYSICAL EXAM:  T(C): 36.7 (04-05-23 @ 11:00), Max: 37.2 (04-04-23 @ 21:23)  HR: 82 (04-05-23 @ 14:23) (73 - 107)  BP: 162/78 (04-05-23 @ 14:23) (134/76 - 168/88)  RR: 16 (04-05-23 @ 11:00) (16 - 18)  SpO2: 100% (04-05-23 @ 11:00) (96% - 100%)  Wt(kg): --  I&O's Summary    04 Apr 2023 07:01  -  05 Apr 2023 07:00  --------------------------------------------------------  IN: 325 mL / OUT: 470 mL / NET: -145 mL        Appearance: No Acute Distress; resting comfortably  HEENT:  Normal oral mucosa, PERRL, EOMI	  Cardiovascular: Normal S1 S2, No JVD, No murmurs/rubs/gallops  Respiratory: Normal respiratory effort; Lungs clear to auscultation bilaterally  Gastrointestinal:  Soft, Non-tender, + BS	  Skin: No rashes, No ecchymoses, No cyanosis	  Neurologic: Non-focal; no weakness  Extremities: LE bandage c/d/i  Vascular: Peripheral pulses palpable 2+ bilaterally  Psychiatry: A & O x 3, Mood & affect appropriate    Laboratory Data:	 	    CBC Full  -  ( 05 Apr 2023 07:26 )  WBC Count : 10.74 K/uL  Hemoglobin : 9.3 g/dL  Hematocrit : 28.2 %  Platelet Count - Automated : 275 K/uL  Mean Cell Volume : 79.4 fl  Mean Cell Hemoglobin : 26.2 pg  Mean Cell Hemoglobin Concentration : 33.0 gm/dL  Auto Neutrophil # : 8.03 K/uL  Auto Lymphocyte # : 1.73 K/uL  Auto Monocyte # : 0.79 K/uL  Auto Eosinophil # : 0.11 K/uL  Auto Basophil # : 0.04 K/uL  Auto Neutrophil % : 74.7 %  Auto Lymphocyte % : 16.1 %  Auto Monocyte % : 7.4 %  Auto Eosinophil % : 1.0 %  Auto Basophil % : 0.4 %    04-05    138  |  106  |  43<H>  ----------------------------<  117<H>  4.3   |  22  |  2.43<H>  04-04    138  |  106  |  45<H>  ----------------------------<  114<H>  4.2   |  21<L>  |  2.64<H>    Ca    9.5      05 Apr 2023 07:26  Ca    8.8      04 Apr 2023 19:14    TPro  7.1  /  Alb  3.2<L>  /  TBili  0.3  /  DBili  x   /  AST  12  /  ALT  <5<L>  /  AlkPhos  85  04-04      proBNP:   Lipid Profile:   HgA1c:   TSH:       CARDIAC MARKERS:            Interpretation of Telemetry: Sinus 	    ECG:  	Not in chart  RADIOLOGY:  OTHER: 	    PREVIOUS DIAGNOSTIC TESTING:    [ ] Echocardiogram:  [ ] Catheterization:  [ ] Stress Test:  	    Assessment:   73 y/o M--patient with a history of type 2 DM complicated with CKD4, PAD with unclear past stenting hx in the RLE, essential HTN tobacco use,  transferred from [facility] 3/30/23 with spouse reporting the patient had lost his R hallux toenail about a week prior to that admission and noted dark discoloration of the R hallux and 2nd toe with foul odor emanating from the R foot    Plan of Care:    #Pre-op risk stratification  - Tentative planning for podiatric and vascular intervention   - Pt displays no evidence of pre-op coronary ischemia  - EKG not in chart, please upload to EMR for further evaluation  - Please obtain TTE for further risk stratifiction    #PAD  - MILLIE pending  - Continue Cilostazol  - Vascular input appreciated    #CKD  - Renal input appreciated    #CAD  - S/p PCI   -Continue Plavix, statin and BB          72 minutes spent on total encounter; more than 50% of the visit was spent counseling and/or coordinating care by the attending physician.   	  Luis E Payan DO [facility]  Cardiovascular Diseases  [phone]

## 2023-04-05 NOTE — DISCHARGE NOTE PROVIDER - NSDCFUADDINST_GEN_ALL_CORE_FT
Podiatry Discharge Instructions:  Follow up: Please follow up with Dr. Silvio Hernandez within 1 week of discharge from the hospital, please call [phone] for appointment and discuss that you recently were seen in the hospital.  Wound Care: Please leave your dressing clean dry intact until your follow up appointment  Weight bearing: Please weight bear as tolerated in a surgical shoe to b/l heel  Antibiotics: Please continue as instructed. 1. follow up with PMD for bloodwork (renal function) and BP check    2. Podiatry Discharge Instructions:  Follow up: Please follow up with Dr. Silvio Hernandez within 1 week of discharge from the hospital, please call [phone] for appointment and discuss that you recently were seen in the hospital.  Wound Care: Please leave your dressing clean dry intact until your follow up appointment  Weight bearing: Please weight bear as tolerated in a surgical shoe to b/l heel  Antibiotics: Please continue as instructed.

## 2023-04-05 NOTE — DISCHARGE NOTE PROVIDER - DETAILS OF MALNUTRITION DIAGNOSIS/DIAGNOSES
This patient has been assessed with a concern for Malnutrition and was treated during this hospitalization for the following Nutrition diagnosis/diagnoses:     -  04/05/2023: Moderate protein-calorie malnutrition   no

## 2023-04-05 NOTE — DIETITIAN INITIAL EVALUATION ADULT - LITERATURE/VIDEOS GIVEN
Heart Healthy Nutrition Therapy, Sodium Content of Foods, Sodium Free Flavoring Tips   Chronic Kidney Disease Stage 3-5 Nutrition Therapy, Phosphorus Content of Food, Potassium Content of Food

## 2023-04-05 NOTE — DIETITIAN INITIAL EVALUATION ADULT - PERSON TAUGHT/METHOD
- Provided education on renal diet, education included importance of monitoring intake of foods high in potassium/phosphorous/sodium, review of foods high in these micronutrients as well as alternatives, monitoring fluid intake  - Discussed DASH diet education. Reviewed foods high in Na and cholesterol to avoid. Reviewed ways to decrease Na in your diet, discussed meal and snack options, tips for eating out   - Encourage adequate consumption of meals/supplements to optimize protein-energy intake/verbal instruction/written material/patient instructed

## 2023-04-05 NOTE — CONSULT NOTE ADULT - CONSULT REASON
Pre-op risk stratification
PAD
BL foot wounds
Bilateral foot wounds
Elevated Serum creatinine
Gangrene

## 2023-04-05 NOTE — DIETITIAN INITIAL EVALUATION ADULT - SIGNS/SYMPTOMS
<75% EER>7 days, recent sudden unintentional wt loss  pt denies previously provided with nutrition education / adhering to nutritional therapy

## 2023-04-05 NOTE — CONSULT NOTE ADULT - REASON FOR ADMISSION
[facility].
LE wounds / [facility]

## 2023-04-05 NOTE — CONSULT NOTE ADULT - SUBJECTIVE AND OBJECTIVE BOX
[facility] DIVISION OF KIDNEY DISEASES AND HYPERTENSION -- [phone]  -- INITIAL CONSULT NOTE  --------------------------------------------------------------------------------  HPI:  Patient is a 72 year old male with DM, PAD, HTN and CKD who presented to a OSH with AMS and noted to have R hallux and 2nd toe dry gangrene and LEFT hallux osteomyelitis. The patient was initially treated with IV ABx however transferred to [facility] for further evaluation. The nephrology team was consulted for elevated Serum creatinine levels and possible need for vascular procedures during the hospital course. On admission pt noted to have a SCr of 2.64 which today has remained elevated at 2.43. The patient was seen and examined earlier today. He states this is the first time he was told that his has any kind of kidney disease and has never been seen by a nephrologist before. He admits the reason he initially presented to the hospital was because of the wound son his feet. He denied any use of NSAIDs, herbal supplements, or illicit substances. He denied any chest pain, shortness of breath, nausea, or vomiting.      PAST HISTORY  --------------------------------------------------------------------------------  PAST MEDICAL & SURGICAL HISTORY:  Essential hypertension      Hyperlipidemia, unspecified hyperlipidemia type      CAD (coronary artery disease)      PVD (peripheral vascular disease) with claudication      Stented coronary artery      Type 2 diabetes mellitus      Stage 4 chronic kidney disease      COVID-19 vaccination refused      Stented coronary artery      PAD (peripheral artery disease)        FAMILY HISTORY:  Family history of essential hypertension      PAST SOCIAL HISTORY:    ALLERGIES & MEDICATIONS  --------------------------------------------------------------------------------  Allergies    penicillin (Unknown)    Intolerances      Standing Inpatient Medications  atorvastatin 40 milliGRAM(s) Oral at bedtime  chlorhexidine 2% Cloths 1 Application(s) Topical daily  cilostazol 100 milliGRAM(s) Oral two times a day  clopidogrel Tablet 75 milliGRAM(s) Oral daily  dextrose 5%. 1000 milliLiter(s) IV Continuous <Continuous>  dextrose 5%. 1000 milliLiter(s) IV Continuous <Continuous>  dextrose 50% Injectable 25 Gram(s) IV Push once  dextrose 50% Injectable 12.5 Gram(s) IV Push once  dextrose 50% Injectable 25 Gram(s) IV Push once  glucagon  Injectable 1 milliGRAM(s) IntraMuscular once  heparin   Injectable 5000 Unit(s) SubCutaneous every 8 hours  insulin lispro (ADMELOG) corrective regimen sliding scale   SubCutaneous three times a day before meals  insulin lispro (ADMELOG) corrective regimen sliding scale   SubCutaneous at bedtime  isosorbide   mononitrate ER Tablet (IMDUR) 30 milliGRAM(s) Oral daily  metoprolol succinate ER 50 milliGRAM(s) Oral daily  NIFEdipine XL 30 milliGRAM(s) Oral daily  pantoprazole    Tablet 40 milliGRAM(s) Oral before breakfast    PRN Inpatient Medications  acetaminophen     Tablet .. 650 milliGRAM(s) Oral every 6 hours PRN  dextrose Oral Gel 15 Gram(s) Oral once PRN      REVIEW OF SYSTEMS  All other systems were reviewed and are negative, except as noted.    VITALS/PHYSICAL EXAM  --------------------------------------------------------------------------------  T(C): 36.7 (04-05-23 @ 11:00), Max: 37.2 (04-04-23 @ 21:23)  HR: 82 (04-05-23 @ 14:23) (73 - 107)  BP: 162/78 (04-05-23 @ 14:23) (134/76 - 168/88)  RR: 16 (04-05-23 @ 11:00) (16 - 18)  SpO2: 100% (04-05-23 @ 11:00) (96% - 100%)  Wt(kg): --        04-04-23 @ 07:01  -  04-05-23 @ 07:00  --------------------------------------------------------  IN: 325 mL / OUT: 470 mL / NET: -145 mL      Physical Exam:  	Gen: NAD  	HEENT: MMM  	Pulm: CTA B/L  	CV: S1S2  	Abd: Soft, +BS   	Ext: No LE edema B/L  	Neuro: Awake  	Skin: Warm and dry    LABS/STUDIES  --------------------------------------------------------------------------------              9.3    10.74 >-----------<  275      [04-05-23 @ 07:26]              28.2     138  |  106  |  43  ----------------------------<  117      [04-05-23 @ 07:26]  4.3   |  22  |  2.43        Ca     9.5     [04-05-23 @ 07:26]    TPro  7.1  /  Alb  3.2  /  TBili  0.3  /  DBili  x   /  AST  12  /  ALT  <5  /  AlkPhos  85  [04-04-23 @ 19:14]    PT/INR: PT 14.4 , INR 1.25       [04-04-23 @ 19:14]  PTT: 28.3       [04-04-23 @ 19:14]      Creatinine Trend:  SCr 2.43 [04-05 @ 07:26]  SCr 2.64 [04-04 @ 19:14]    Urinalysis - [04-04-23 @ 23:03]      Color Light Yellow / Appearance Clear / SG 1.013 / pH 6.0      Gluc Negative / Ketone Negative  / Bili Negative / Urobili Negative       Blood Negative / Protein Trace / Leuk Est Negative / Nitrite Negative      RBC 1 / WBC 0 / Hyaline  / Gran  / Sq Epi  / Non Sq Epi 0 / Bacteria Negative    Urine Creatinine 91      [04-04-23 @ 23:04]  Urine Protein 16      [04-04-23 @ 23:04]

## 2023-04-05 NOTE — DIETITIAN INITIAL EVALUATION ADULT - NSICDXPASTMEDICALHX_GEN_ALL_CORE_FT
PAST MEDICAL HISTORY:  CAD (coronary artery disease)     COVID-19 vaccination refused     Essential hypertension     Hyperlipidemia, unspecified hyperlipidemia type     PVD (peripheral vascular disease) with claudication     Stage 4 chronic kidney disease     Stented coronary artery     Type 2 diabetes mellitus

## 2023-04-05 NOTE — DIETITIAN INITIAL EVALUATION ADULT - ORAL NUTRITION SUPPLEMENTS
- Nepro 2x daily (850 kcals, 38 g protein).   - If not medically contraindicated, recommend Nephro-dorita daily

## 2023-04-05 NOTE — DISCHARGE NOTE PROVIDER - NSDCFUADDAPPT_GEN_ALL_CORE_FT
Podiatry Discharge Instructions:  Follow up: Please follow up with Dr. Silvio Hernandez within 1 week of discharge from the hospital, please call [phone] for appointment and discuss that you recently were seen in the hospital.  Wound Care: Please leave your dressing clean dry intact until your follow up appointment  Weight bearing: Please weight bear as tolerated in a surgical shoe to b/l heel  Antibiotics: Please continue as instructed. APPTS ARE READY TO BE MADE: [x] YES    Best Family or Patient Contact (if needed):    Additional Information about above appointments (if needed):    1:   2:   3:     Other comments or requests:    APPTS ARE READY TO BE MADE: [x] YES    Best Family or Patient Contact (if needed):    Additional Information about above appointments (if needed):    1:   2:   3:     Other comments or requests:   Patient was scheduled with Dr. Bannazadeh, Mohsen on (5/4/2023 1 PM) at [address] [place] [address]. Patient advised of appointment details.   Patient was scheduled with Silvio Villalobos on (4/25/2023 2 PM) at [address] [place] [address], [place] [address] through the provider's office. Patient advised of appointment details.

## 2023-04-06 ENCOUNTER — TRANSCRIPTION ENCOUNTER (OUTPATIENT)
Age: 73
End: 2023-04-06

## 2023-04-06 DIAGNOSIS — D64.9 ANEMIA, UNSPECIFIED: ICD-10-CM

## 2023-04-06 LAB
ANION GAP SERPL CALC-SCNC: 10 MMOL/L — SIGNIFICANT CHANGE UP (ref 5–17)
BUN SERPL-MCNC: 44 MG/DL — HIGH (ref 7–23)
CALCIUM SERPL-MCNC: 9.1 MG/DL — SIGNIFICANT CHANGE UP (ref 8.4–10.5)
CHLORIDE SERPL-SCNC: 106 MMOL/L — SIGNIFICANT CHANGE UP (ref 96–108)
CO2 SERPL-SCNC: 22 MMOL/L — SIGNIFICANT CHANGE UP (ref 22–31)
CREAT SERPL-MCNC: 2.48 MG/DL — HIGH (ref 0.5–1.3)
EGFR: 27 ML/MIN/1.73M2 — LOW
GLUCOSE BLDC GLUCOMTR-MCNC: 200 MG/DL — HIGH (ref 70–99)
GLUCOSE BLDC GLUCOMTR-MCNC: 216 MG/DL — HIGH (ref 70–99)
GLUCOSE BLDC GLUCOMTR-MCNC: 227 MG/DL — HIGH (ref 70–99)
GLUCOSE SERPL-MCNC: 124 MG/DL — HIGH (ref 70–99)
HCT VFR BLD CALC: 26.9 % — LOW (ref 39–50)
HGB BLD-MCNC: 9 G/DL — LOW (ref 13–17)
MCHC RBC-ENTMCNC: 26.6 PG — LOW (ref 27–34)
MCHC RBC-ENTMCNC: 33.5 GM/DL — SIGNIFICANT CHANGE UP (ref 32–36)
MCV RBC AUTO: 79.6 FL — LOW (ref 80–100)
NRBC # BLD: 0 /100 WBCS — SIGNIFICANT CHANGE UP (ref 0–0)
PHOSPHATE SERPL-MCNC: 4 MG/DL — SIGNIFICANT CHANGE UP (ref 2.5–4.5)
PLATELET # BLD AUTO: 285 K/UL — SIGNIFICANT CHANGE UP (ref 150–400)
POTASSIUM SERPL-MCNC: 4.3 MMOL/L — SIGNIFICANT CHANGE UP (ref 3.5–5.3)
POTASSIUM SERPL-SCNC: 4.3 MMOL/L — SIGNIFICANT CHANGE UP (ref 3.5–5.3)
RBC # BLD: 3.38 M/UL — LOW (ref 4.2–5.8)
RBC # FLD: 16.1 % — HIGH (ref 10.3–14.5)
SODIUM SERPL-SCNC: 138 MMOL/L — SIGNIFICANT CHANGE UP (ref 135–145)
WBC # BLD: 9.52 K/UL — SIGNIFICANT CHANGE UP (ref 3.8–10.5)
WBC # FLD AUTO: 9.52 K/UL — SIGNIFICANT CHANGE UP (ref 3.8–10.5)

## 2023-04-06 PROCEDURE — 99232 SBSQ HOSP IP/OBS MODERATE 35: CPT | Mod: GC

## 2023-04-06 PROCEDURE — 99232 SBSQ HOSP IP/OBS MODERATE 35: CPT

## 2023-04-06 PROCEDURE — 93356 MYOCRD STRAIN IMG SPCKL TRCK: CPT

## 2023-04-06 PROCEDURE — 76770 US EXAM ABDO BACK WALL COMP: CPT | Mod: 26

## 2023-04-06 PROCEDURE — 99233 SBSQ HOSP IP/OBS HIGH 50: CPT

## 2023-04-06 PROCEDURE — 93306 TTE W/DOPPLER COMPLETE: CPT | Mod: 26

## 2023-04-06 RX ADMIN — HEPARIN SODIUM 5000 UNIT(S): 5000 INJECTION INTRAVENOUS; SUBCUTANEOUS at 05:38

## 2023-04-06 RX ADMIN — CILOSTAZOL 100 MILLIGRAM(S): 100 TABLET ORAL at 05:39

## 2023-04-06 RX ADMIN — Medication 2: at 16:59

## 2023-04-06 RX ADMIN — PANTOPRAZOLE SODIUM 40 MILLIGRAM(S): 20 TABLET, DELAYED RELEASE ORAL at 05:41

## 2023-04-06 RX ADMIN — CLOPIDOGREL BISULFATE 75 MILLIGRAM(S): 75 TABLET, FILM COATED ORAL at 12:29

## 2023-04-06 RX ADMIN — CILOSTAZOL 100 MILLIGRAM(S): 100 TABLET ORAL at 17:02

## 2023-04-06 RX ADMIN — Medication 2: at 12:31

## 2023-04-06 RX ADMIN — Medication 30 MILLIGRAM(S): at 05:38

## 2023-04-06 RX ADMIN — ATORVASTATIN CALCIUM 40 MILLIGRAM(S): 80 TABLET, FILM COATED ORAL at 21:43

## 2023-04-06 RX ADMIN — CHLORHEXIDINE GLUCONATE 1 APPLICATION(S): 213 SOLUTION TOPICAL at 13:21

## 2023-04-06 RX ADMIN — ISOSORBIDE MONONITRATE 30 MILLIGRAM(S): 60 TABLET, EXTENDED RELEASE ORAL at 12:29

## 2023-04-06 RX ADMIN — Medication 50 MILLIGRAM(S): at 05:38

## 2023-04-06 NOTE — PROGRESS NOTE ADULT - PROBLEM SELECTOR PLAN 2
Class I (easy) - visualization of the soft palate, fauces, uvula, and both anterior and posterior pillars -minimal role for abx given chronic and gangrene  -amputation is best option

## 2023-04-06 NOTE — PROGRESS NOTE ADULT - PROBLEM SELECTOR PLAN 2
Pt. with anemia. Check iron studies. Monitor H/H. Transfuse as per primary team.     If any questions, please feel free to contact me     Jerry Gamble  Nephrology Fellow  [facility] Pager: [phone]

## 2023-04-06 NOTE — PROGRESS NOTE ADULT - PROBLEM SELECTOR PLAN 1
Pt. with elevated serum creatinine; unclear at this time whether or not this is CKD vs PRIYA. No reported history of kidney disease but documented in chart that he has CKD stage 4. No previous labs available for review but on admission pt noted to have a SCr of 2.64 which today has remained elevated at 2.48. UA reviewed without evidence of significant hematuria or proteinuria. Agree with holding ACE/ARB at this time. Renal sonogram pending at this time. Recommend checking PVR to rule out urinary retention. Monitor labs and urine output. Avoid potential nephrotoxins at this time. Understand the patient may require further studies involving IV contrast; will follow along to help mitigate risks of DEBBIE. Dose medications as per eGFR.

## 2023-04-06 NOTE — PROGRESS NOTE ADULT - ATTENDING COMMENTS
transfer to [facility] for pvd/om/further vasc workup  #ckd stage 1v  cr stable  serologic workup to be sent  ua neg prot/blood  check bladder scan and r/o retention  #anemia  check iron studies and ferritin  #htn-cont bp meds

## 2023-04-06 NOTE — PROGRESS NOTE ADULT - SUBJECTIVE AND OBJECTIVE BOX
Vascular Cardiology Attending Progress Note    DIRECT PROVIDER NUMBER: [phone]     CC: LE wounds / transferred from [facility] for possible vascular intervention    Overnight no events. Labs reviewed.     MEDICATIONS:  cilostazol 100 milliGRAM(s) Oral two times a day  clopidogrel Tablet 75 milliGRAM(s) Oral daily  heparin   Injectable 5000 Unit(s) SubCutaneous every 8 hours  isosorbide   mononitrate ER Tablet (IMDUR) 30 milliGRAM(s) Oral daily  NIFEdipine XL 30 milliGRAM(s) Oral daily  acetaminophen     Tablet .. 650 milliGRAM(s) Oral every 6 hours PRN  glucagon  Injectable 1 milliGRAM(s) IntraMuscular once  insulin lispro (ADMELOG) corrective regimen sliding scale   SubCutaneous three times a day before meals  insulin lispro (ADMELOG) corrective regimen sliding scale   SubCutaneous at bedtime  chlorhexidine 2% Cloths 1 Application(s) Topical daily  dextrose 5%. 1000 milliLiter(s) IV Continuous <Continuous>  dextrose 5%. 1000 milliLiter(s) IV Continuous <Continuous>    PHYSICAL EXAM:  Vital Signs Last 24 Hrs  T(C): 36.9 (06 Apr 2023 11:33), Max: 36.9 (06 Apr 2023 04:54)  T(F): 98.5 (06 Apr 2023 11:33), Max: 98.5 (06 Apr 2023 11:33)  HR: 82 (06 Apr 2023 11:33) (82 - 90)  BP: 123/72 (06 Apr 2023 11:33) (123/72 - 162/78)  BP(mean): --  RR: 18 (06 Apr 2023 11:33) (16 - 18)  SpO2: 98% (06 Apr 2023 11:33) (98% - 100%)    Parameters below as of 06 Apr 2023 11:33  Patient On (Oxygen Delivery Method): room air    Appearance: NAD 	  HEENT:  NC/AT  Cardiovascular: RRR, S1 and S2  Respiratory: CTA B/L  Psychiatry:  AAO x 3  Gastrointestinal:  Soft, Non-tender, + BS	  Skin: No cyanosis	  Neurologic: No focal deficits noted  Extremities: no LE edema  Vascular Pulse Exam: Audible bilateral DP and PT signals  Foot Exam:  Left foot distal lateral hallux wound, 2nd digit dry gangrene  Right foot 2nd and 3rd digit dry gangrene, dorsal medial midfoot dry gangrene    LABS:	 	  Reviewed    MILLIE and Arterial duplex:   Findings:    The right MILLIE equals 0.50; the left MILLIE equals 0.58.    Right and left inguinal lymph nodes are imaged.    Atheromatous disease of varying severity is present along the course of   both the right and left lower extremity arteries.    There is a short, patent stent in the proximal right superficial femoral   artery.    There is a nonimaged flow-limiting stenosis or occlusion of the right   superficial femoral artery in the distal thigh with tardus parvus flow   through the right popliteal artery.    The peak systolic velocities of the Right lower extremity are as follows:    Distal external iliac artery: 83 cm/s  Common femoral artery: 194 cm/s  Deep femoral artery: 88 cm/s  Superficial femoral artery: 79 cm/s proximal,  70 cm/s mid, 69 cm/s distal  Popliteal artery: Tardus parvus, 32 cm/s  Tibioperoneal trunk: 35 cm/s  Posterior tibial artery: 32 cm/s proximal,  26 cm/s mid, 26 cm/s distal  Peroneal artery: 26 cm/s proximal,  23 cm/s mid, 31 cm/s distal  Anterior tibial artery: 29 cm/s proximal,  46 cm/s mid, 25 cm/s distal  Dorsalis pedis artery: 33 cm/s    The peak systolic velocities of the Left lower extremity are as follows:    Distal external iliac artery: 130 cm/s  Common femoral artery: 130 cm/s  Deep femoral artery: 96 cm/s  Superficial femoral artery: OCCLUDED proximal,  43 cm/s mid, 22 cm/s   distal  Popliteal artery: 32 cm/s  Tibioperoneal trunk: 24 cm/s  Posterior tibial artery: 25 cm/s proximal,  30 cm/s mid, 25 cm/s distal  Peroneal artery: 25 cm/s proximal,  15 cm/s mid, 15 cm/s distal  Anterior tibial artery: 18 cm/s proximal,  23 cm/s mid, 12 cm/s distal  Dorsalis pedis artery: OCCLUDED    Impression:  On the right, there is a flow-limiting stenosis of the common femoral  There is a nonimaged flow-limiting stenosis or occlusion of the right   superficial femoral artery in the distal thigh.    There is a segmental occlusion of the left superficial femoral artery in   the proximal thigh.  The left dorsal pedis artery is occluded.    Assessment:  1. PAD with CLI Centre Classification 6 with bilateral  of the SFAs   2. CAD s/p prior PCI  3. HTN  4. HLD  5. DM  6. CKD stage 4  7. Current Smoker      Plan:  1. Plan for peripheral angiography tomorrow afternoon.   2. Appreciate renal recommendations.   3. Appreciate Podiatry and Vascular surgery.   4. Statin therapy if no contraindication.   5. Continue antiplatelet therapy.     Please call with any questions:   DIRECT SERVICE NUMBER: [phone]

## 2023-04-06 NOTE — PROGRESS NOTE ADULT - SUBJECTIVE AND OBJECTIVE BOX
ADDIS ARAUZ 72y [id number]    Patient is a 72y old  Male who presents with a chief complaint of Transferred from [facility] for possible vascular intervention. (2023 09:48)      Follow Up/CC:  ID following for toe gangrene    Interval History/ROS: no fever, no complaints     Allergies    penicillin (Unknown)    Intolerances        ANTIMICROBIALS:      MEDICATIONS  (STANDING):  atorvastatin 40 milliGRAM(s) Oral at bedtime  chlorhexidine 2% Cloths 1 Application(s) Topical daily  cilostazol 100 milliGRAM(s) Oral two times a day  clopidogrel Tablet 75 milliGRAM(s) Oral daily  dextrose 5%. 1000 milliLiter(s) (100 mL/Hr) IV Continuous <Continuous>  dextrose 5%. 1000 milliLiter(s) (50 mL/Hr) IV Continuous <Continuous>  dextrose 50% Injectable 25 Gram(s) IV Push once  dextrose 50% Injectable 12.5 Gram(s) IV Push once  dextrose 50% Injectable 25 Gram(s) IV Push once  glucagon  Injectable 1 milliGRAM(s) IntraMuscular once  heparin   Injectable 5000 Unit(s) SubCutaneous every 8 hours  insulin lispro (ADMELOG) corrective regimen sliding scale   SubCutaneous three times a day before meals  insulin lispro (ADMELOG) corrective regimen sliding scale   SubCutaneous at bedtime  isosorbide   mononitrate ER Tablet (IMDUR) 30 milliGRAM(s) Oral daily  metoprolol succinate ER 50 milliGRAM(s) Oral daily  NIFEdipine XL 30 milliGRAM(s) Oral daily  pantoprazole    Tablet 40 milliGRAM(s) Oral before breakfast    MEDICATIONS  (PRN):  acetaminophen     Tablet .. 650 milliGRAM(s) Oral every 6 hours PRN Temp greater or equal to 38C (100.4F), Mild Pain (1 - 3)  dextrose Oral Gel 15 Gram(s) Oral once PRN Blood Glucose LESS THAN 70 milliGRAM(s)/deciliter        Vital Signs Last 24 Hrs  T(C): 36.9 (2023 11:33), Max: 36.9 (2023 04:54)  T(F): 98.5 (2023 11:33), Max: 98.5 (2023 11:33)  HR: 82 (2023 11:33) (82 - 90)  BP: 123/72 (2023 11:33) (123/72 - 162/78)  BP(mean): --  RR: 18 (2023 11:33) (16 - 18)  SpO2: 98% (2023 11:33) (98% - 100%)    Parameters below as of 2023 11:33  Patient On (Oxygen Delivery Method): room air        CBC Full  -  ( 2023 06:44 )  WBC Count : 9.52 K/uL  RBC Count : 3.38 M/uL  Hemoglobin : 9.0 g/dL  Hematocrit : 26.9 %  Platelet Count - Automated : 285 K/uL  Mean Cell Volume : 79.6 fl  Mean Cell Hemoglobin : 26.6 pg  Mean Cell Hemoglobin Concentration : 33.5 gm/dL  Auto Neutrophil # : x  Auto Lymphocyte # : x  Auto Monocyte # : x  Auto Eosinophil # : x  Auto Basophil # : x  Auto Neutrophil % : x  Auto Lymphocyte % : x  Auto Monocyte % : x  Auto Eosinophil % : x  Auto Basophil % : x    04-    138  |  106  |  44<H>  ----------------------------<  124<H>  4.3   |  22  |  2.48<H>    Ca    9.1      2023 06:44  Phos  4.0     04-06    TPro  7.1  /  Alb  3.2<L>  /  TBili  0.3  /  DBili  x   /  AST  12  /  ALT  <5<L>  /  AlkPhos  85  04-04    LIVER FUNCTIONS - ( 2023 19:14 )  Alb: 3.2 g/dL / Pro: 7.1 g/dL / ALK PHOS: 85 U/L / ALT: <5 U/L / AST: 12 U/L / GGT: x           Urinalysis Basic - ( 2023 23:03 )    Color: Light Yellow / Appearance: Clear / S.013 / pH: x  Gluc: x / Ketone: Negative  / Bili: Negative / Urobili: Negative   Blood: x / Protein: Trace / Nitrite: Negative   Leuk Esterase: Negative / RBC: 1 /hpf / WBC 0 /HPF   Sq Epi: x / Non Sq Epi: 0 /hpf / Bacteria: Negative        MICROBIOLOGY:  .Abscess Left foot  23   Few Citrobacter freundii complex Susceptibility to follow.  Normal skin jason isolated  --  --              v            RADIOLOGY

## 2023-04-06 NOTE — PROGRESS NOTE ADULT - SUBJECTIVE AND OBJECTIVE BOX
[facility] Division of Hospital Medicine  Mary Saravia MD  Pager (M-F, 8E-9C): [phone]  Other Times:  [phone]    Patient is a 72y old  Male who presents with a chief complaint of Transferred from [facility] for possible vascular intervention. (2023 10:45)      SUBJECTIVE / OVERNIGHT EVENTS:  feeling ok. denies any specific complaints. afebrile.     ADDITIONAL REVIEW OF SYSTEMS: denies other specific complaints.     MEDICATIONS  (STANDING):  atorvastatin 40 milliGRAM(s) Oral at bedtime  chlorhexidine 2% Cloths 1 Application(s) Topical daily  cilostazol 100 milliGRAM(s) Oral two times a day  clopidogrel Tablet 75 milliGRAM(s) Oral daily  dextrose 5%. 1000 milliLiter(s) (100 mL/Hr) IV Continuous <Continuous>  dextrose 5%. 1000 milliLiter(s) (50 mL/Hr) IV Continuous <Continuous>  dextrose 50% Injectable 25 Gram(s) IV Push once  dextrose 50% Injectable 12.5 Gram(s) IV Push once  dextrose 50% Injectable 25 Gram(s) IV Push once  glucagon  Injectable 1 milliGRAM(s) IntraMuscular once  heparin   Injectable 5000 Unit(s) SubCutaneous every 8 hours  insulin lispro (ADMELOG) corrective regimen sliding scale   SubCutaneous three times a day before meals  insulin lispro (ADMELOG) corrective regimen sliding scale   SubCutaneous at bedtime  isosorbide   mononitrate ER Tablet (IMDUR) 30 milliGRAM(s) Oral daily  metoprolol succinate ER 50 milliGRAM(s) Oral daily  NIFEdipine XL 30 milliGRAM(s) Oral daily  pantoprazole    Tablet 40 milliGRAM(s) Oral before breakfast    MEDICATIONS  (PRN):  acetaminophen     Tablet .. 650 milliGRAM(s) Oral every 6 hours PRN Temp greater or equal to 38C (100.4F), Mild Pain (1 - 3)  dextrose Oral Gel 15 Gram(s) Oral once PRN Blood Glucose LESS THAN 70 milliGRAM(s)/deciliter      CAPILLARY BLOOD GLUCOSE      POCT Blood Glucose.: 216 mg/dL (2023 12:21)  POCT Blood Glucose.: 211 mg/dL (2023 21:00)  POCT Blood Glucose.: 139 mg/dL (2023 16:38)  POCT Blood Glucose.: 140 mg/dL (2023 16:28)  POCT Blood Glucose.: 127 mg/dL (2023 13:33)    I&O's Summary    2023 07:01  -  2023 07:00  --------------------------------------------------------  IN: 0 mL / OUT: 250 mL / NET: -250 mL        PHYSICAL EXAM:  Vital Signs Last 24 Hrs  T(C): 36.9 (2023 11:33), Max: 36.9 (2023 04:54)  T(F): 98.5 (2023 11:33), Max: 98.5 (2023 11:33)  HR: 82 (2023 11:33) (82 - 90)  BP: 123/72 (2023 11:33) (123/72 - 162/78)  BP(mean): --  RR: 18 (2023 11:33) (16 - 18)  SpO2: 98% (2023 11:33) (98% - 100%)    Parameters below as of 2023 11:33  Patient On (Oxygen Delivery Method): room air      CONSTITUTIONAL: NAD   EYES:  conjunctiva and sclera clear  ENMT: Moist oral mucosa  NECK: Supple, no palpable masses; no JVD  RESPIRATORY: Normal respiratory effort; lungs are clear to auscultation bilaterally  CARDIOVASCULAR: Regular rate and rhythm, normal S1 and S2, no murmur/rub/gallop;  lower extremity edema  ABDOMEN: Nontender to palpation, normoactive bowel sounds, no rebound/guarding  MUSCULOSKELETAL:  no clubbing or cyanosis of digits; no joint swelling or tenderness to palpation  PSYCH: A+O to person, place, and time; affect appropriate  SKIN: bilateral foot wrapped in gauze c/d/i . malodorous, tender to touch       LABS:                        9.0    9.52  )-----------( 285      ( 2023 06:44 )             26.9     04-06    138  |  106  |  44<H>  ----------------------------<  124<H>  4.3   |  22  |  2.48<H>    Ca    9.1      2023 06:44  Phos  4.0     -    TPro  7.1  /  Alb  3.2<L>  /  TBili  0.3  /  DBili  x   /  AST  12  /  ALT  <5<L>  /  AlkPhos  85  04-    PT/INR - ( 2023 19:14 )   PT: 14.4 sec;   INR: 1.25 ratio         PTT - ( 2023 19:14 )  PTT:28.3 sec      Urinalysis Basic - ( 2023 23:03 )    Color: Light Yellow / Appearance: Clear / S.013 / pH: x  Gluc: x / Ketone: Negative  / Bili: Negative / Urobili: Negative   Blood: x / Protein: Trace / Nitrite: Negative   Leuk Esterase: Negative / RBC: 1 /hpf / WBC 0 /HPF   Sq Epi: x / Non Sq Epi: 0 /hpf / Bacteria: Negative        Culture - Abscess with Gram Stain (collected 2023 22:44)  Source: .Abscess Left foot  Preliminary Report (2023 09:14):    Few Citrobacter freundii complex Susceptibility to follow.    Normal skin jason isolated        RADIOLOGY & ADDITIONAL TESTS:  Results Reviewed:   Imaging Personally Reviewed:  Electrocardiogram Personally Reviewed:    COORDINATION OF CARE:  Care Discussed with Consultants/Other Providers [Y/N]:  Prior or Outpatient Records Reviewed [Y/N]:

## 2023-04-06 NOTE — PROGRESS NOTE ADULT - ASSESSMENT
73 yo m with CKD4, PAD, stent, DM2, CAD, HTN, current smoker p/w LE dry gangrene and OM concerning for PVD.

## 2023-04-06 NOTE — PROGRESS NOTE ADULT - SUBJECTIVE AND OBJECTIVE BOX
Cardiovascular Disease Progress Note  Date of service: 04-06-23 @ 09:48  Covering for Dr. Liu  Overnight events: No acute events overnight.  Pt is in no distress.   Otherwise review of systems negative    Objective Findings:  T(C): 36.9 (04-06-23 @ 04:54), Max: 36.9 (04-06-23 @ 04:54)  HR: 84 (04-06-23 @ 04:54) (82 - 90)  BP: 126/73 (04-06-23 @ 04:54) (126/73 - 166/82)  RR: 18 (04-06-23 @ 04:54) (16 - 18)  SpO2: 100% (04-06-23 @ 04:54) (100% - 100%)  Wt(kg): --  Daily     Daily       Physical Exam:  Gen: NAD; Patient resting comfortably  HEENT: EOMI, Normocephalic/ atraumatic  CV: RRR, normal S1 + S2, no m/r/g  Lungs:  Normal respiratory effort; clear to auscultation bilaterally  Abd: soft, non-tender; bowel sounds present  Ext: No edema; warm and well perfused      Laboratory Data:                        9.0    9.52  )-----------( 285      ( 06 Apr 2023 06:44 )             26.9     04-06    138  |  106  |  44<H>  ----------------------------<  124<H>  4.3   |  22  |  2.48<H>    Ca    9.1      06 Apr 2023 06:44  Phos  4.0     04-06    TPro  7.1  /  Alb  3.2<L>  /  TBili  0.3  /  DBili  x   /  AST  12  /  ALT  <5<L>  /  AlkPhos  85  04-04    PT/INR - ( 04 Apr 2023 19:14 )   PT: 14.4 sec;   INR: 1.25 ratio         PTT - ( 04 Apr 2023 19:14 )  PTT:28.3 sec          Inpatient Medications:  MEDICATIONS  (STANDING):  atorvastatin 40 milliGRAM(s) Oral at bedtime  chlorhexidine 2% Cloths 1 Application(s) Topical daily  cilostazol 100 milliGRAM(s) Oral two times a day  clopidogrel Tablet 75 milliGRAM(s) Oral daily  dextrose 5%. 1000 milliLiter(s) (100 mL/Hr) IV Continuous <Continuous>  dextrose 5%. 1000 milliLiter(s) (50 mL/Hr) IV Continuous <Continuous>  dextrose 50% Injectable 25 Gram(s) IV Push once  dextrose 50% Injectable 12.5 Gram(s) IV Push once  dextrose 50% Injectable 25 Gram(s) IV Push once  glucagon  Injectable 1 milliGRAM(s) IntraMuscular once  heparin   Injectable 5000 Unit(s) SubCutaneous every 8 hours  insulin lispro (ADMELOG) corrective regimen sliding scale   SubCutaneous three times a day before meals  insulin lispro (ADMELOG) corrective regimen sliding scale   SubCutaneous at bedtime  isosorbide   mononitrate ER Tablet (IMDUR) 30 milliGRAM(s) Oral daily  metoprolol succinate ER 50 milliGRAM(s) Oral daily  NIFEdipine XL 30 milliGRAM(s) Oral daily  pantoprazole    Tablet 40 milliGRAM(s) Oral before breakfast      Assessment:  73 y/o M--patient with a history of type 2 DM complicated with CKD4, PAD with unclear past stenting hx in the RLE, essential HTN tobacco use,  transferred from [facility] 3/30/23 with spouse reporting the patient had lost his R hallux toenail about a week prior to that admission and noted dark discoloration of the R hallux and 2nd toe with foul odor emanating from the R foot    Plan of Care:    #Pre-op risk stratification  - Tentative planning for podiatric and vascular intervention   - Pt displays no evidence of pre-op coronary ischemia  - EKG not in chart, please upload to EMR for further evaluation  - Please obtain TTE for further risk stratifiction    #PAD  - MILLIE pending  - Continue Cilostazol  - Vascular input appreciated    #CKD  - Renal input appreciated    #CAD  - S/p PCI   -Continue Plavix, statin and BB      #ACP (advance care planning)-  Advanced care planning was discussed with the patient.  Risks, benefits and alternatives of medical treatment and procedures were discussed in detail and all questions were answered. 30 additional minutes spent addressing advance care plans.            Over 25 minutes spent on total encounter; more than 50% of the visit was spent counseling and/or coordinating care by the attending physician.      Luis E Payan DO [facility]  Cardiovascular Disease  [phone]

## 2023-04-06 NOTE — PROGRESS NOTE ADULT - ASSESSMENT
73 y/o M--patient with a history of type 2 DM complicated with CKD4, PAD with past stenting RLE, essential HTN, apparently still active cigarette use until one week ago, UNVACCINATED against COVID-19, with initial admission to [facility] 3/30/23 in the setting of patient with mild cognitive impairment, with spouse reporting the patient had lost his RIGHT hallux toenail about a week prior to that admission and noted dark discoloration of the RIGHT hallux and 2nd toe with foul odor emanating from the RIGHT foot+ left foot gangrene    Miguel Galicia  Attending Physician   Division of Infectious Disease  Office #[phone]  Available on Microsoft Teams also  After 5pm/weekend or no response, call #[phone]

## 2023-04-06 NOTE — PROGRESS NOTE ADULT - PROBLEM SELECTOR PLAN 1
Pt with stay at OSH on iV abx seen by pod and ID there  ID rec appreciated - monitor off abx   wound cultures sent by pod. will follow  no clinical signs and symptoms of overt systemic infection.   Plan for surg intervention as per pod and vasc w/u and renal/card clearance

## 2023-04-06 NOTE — PROGRESS NOTE ADULT - PROBLEM SELECTOR PLAN 2
VA arterial duplex showing mod severe dz.   further eval by vasc/vasc-card.   Pod plans for interventioin pending vasc w/u  ID/card/renal clearance prior to intervention.  renal US and echo pending.

## 2023-04-06 NOTE — PROGRESS NOTE ADULT - PROBLEM SELECTOR PLAN 1
-appears dry  -pt nontoxic and no active s/s of infection  -hold off abx  -care per vascular/podiatry teams

## 2023-04-06 NOTE — PROGRESS NOTE ADULT - SUBJECTIVE AND OBJECTIVE BOX
[facility] DIVISION OF KIDNEY DISEASES AND HYPERTENSION -- FOLLOW UP NOTE  --------------------------------------------------------------------------------  72 year old male with DM, PAD, HTN and CKD who presented to a OSH with AMS and noted to have R hallux and 2nd toe dry gangrene and LEFT hallux osteomyelitis. The patient was initially treated with IV ABx however transferred to [facility] for further evaluation. The nephrology team was consulted for elevated Serum creatinine levels and possible need for vascular procedures during the hospital course. On admission pt noted to have a SCr of 2.64 which today has remained elevated at 2.48    24 hour events/subjective:  Pt. seen and examined earlier today. Reported that he sometimes feels like he does not empty his bladder all the way.   Denied any chest pain, shortness of breath, nausea, vomiting, or abdominal pain.       PAST HISTORY  --------------------------------------------------------------------------------  No significant changes to PMH, PSH, FHx, SHx, unless otherwise noted    ALLERGIES & MEDICATIONS  --------------------------------------------------------------------------------  Allergies    penicillin (Unknown)    Intolerances      Standing Inpatient Medications  atorvastatin 40 milliGRAM(s) Oral at bedtime  chlorhexidine 2% Cloths 1 Application(s) Topical daily  cilostazol 100 milliGRAM(s) Oral two times a day  clopidogrel Tablet 75 milliGRAM(s) Oral daily  dextrose 5%. 1000 milliLiter(s) IV Continuous <Continuous>  dextrose 5%. 1000 milliLiter(s) IV Continuous <Continuous>  dextrose 50% Injectable 25 Gram(s) IV Push once  dextrose 50% Injectable 12.5 Gram(s) IV Push once  dextrose 50% Injectable 25 Gram(s) IV Push once  glucagon  Injectable 1 milliGRAM(s) IntraMuscular once  heparin   Injectable 5000 Unit(s) SubCutaneous every 8 hours  insulin lispro (ADMELOG) corrective regimen sliding scale   SubCutaneous three times a day before meals  insulin lispro (ADMELOG) corrective regimen sliding scale   SubCutaneous at bedtime  isosorbide   mononitrate ER Tablet (IMDUR) 30 milliGRAM(s) Oral daily  metoprolol succinate ER 50 milliGRAM(s) Oral daily  NIFEdipine XL 30 milliGRAM(s) Oral daily  pantoprazole    Tablet 40 milliGRAM(s) Oral before breakfast    PRN Inpatient Medications  acetaminophen     Tablet .. 650 milliGRAM(s) Oral every 6 hours PRN  dextrose Oral Gel 15 Gram(s) Oral once PRN      REVIEW OF SYSTEMS    All other systems were reviewed and are negative, except as noted.    VITALS/PHYSICAL EXAM  --------------------------------------------------------------------------------  T(C): 36.9 (04-06-23 @ 04:54), Max: 36.9 (04-06-23 @ 04:54)  HR: 84 (04-06-23 @ 04:54) (73 - 90)  BP: 126/73 (04-06-23 @ 04:54) (126/73 - 168/88)  RR: 18 (04-06-23 @ 04:54) (16 - 18)  SpO2: 100% (04-06-23 @ 04:54) (100% - 100%)  Wt(kg): --        04-05-23 @ 07:01  -  04-06-23 @ 07:00  --------------------------------------------------------  IN: 0 mL / OUT: 250 mL / NET: -250 mL        Physical Exam:  	Gen: NAD  	HEENT: MMM  	Pulm: CTA B/L  	CV: S1S2  	Abd: Soft, +BS   	Ext: No LE edema B/L; foot bandages  	Neuro: Awake  	Skin: Warm and dry      LABS/STUDIES  --------------------------------------------------------------------------------              9.0    9.52  >-----------<  285      [04-06-23 @ 06:44]              26.9     138  |  106  |  44  ----------------------------<  124      [04-06-23 @ 06:44]  4.3   |  22  |  2.48        Ca     9.1     [04-06-23 @ 06:44]      Phos  4.0     [04-06-23 @ 06:44]    TPro  7.1  /  Alb  3.2  /  TBili  0.3  /  DBili  x   /  AST  12  /  ALT  <5  /  AlkPhos  85  [04-04-23 @ 19:14]    PT/INR: PT 14.4 , INR 1.25       [04-04-23 @ 19:14]  PTT: 28.3       [04-04-23 @ 19:14]      Creatinine Trend:  SCr 2.48 [04-06 @ 06:44]  SCr 2.43 [04-05 @ 07:26]  SCr 2.64 [04-04 @ 19:14]    Urinalysis - [04-04-23 @ 23:03]      Color Light Yellow / Appearance Clear / SG 1.013 / pH 6.0      Gluc Negative / Ketone Negative  / Bili Negative / Urobili Negative       Blood Negative / Protein Trace / Leuk Est Negative / Nitrite Negative      RBC 1 / WBC 0 / Hyaline  / Gran  / Sq Epi  / Non Sq Epi 0 / Bacteria Negative    Urine Creatinine 91      [04-04-23 @ 23:04]  Urine Protein 16      [04-04-23 @ 23:04]      HCV 0.16, Nonreact      [04-05-23 @ 07:26]

## 2023-04-06 NOTE — PROGRESS NOTE ADULT - PROBLEM SELECTOR PLAN 3
Unclear baseline.   will cont to monitor overall trend , outpt, lytes, weight.   renal eval appreciated . renal US.

## 2023-04-07 LAB
-  AMIKACIN: SIGNIFICANT CHANGE UP
-  AMOXICILLIN/CLAVULANIC ACID: SIGNIFICANT CHANGE UP
-  AMPICILLIN/SULBACTAM: SIGNIFICANT CHANGE UP
-  AMPICILLIN: SIGNIFICANT CHANGE UP
-  AZTREONAM: SIGNIFICANT CHANGE UP
-  CEFAZOLIN: SIGNIFICANT CHANGE UP
-  CEFEPIME: SIGNIFICANT CHANGE UP
-  CEFOXITIN: SIGNIFICANT CHANGE UP
-  CEFTRIAXONE: SIGNIFICANT CHANGE UP
-  CIPROFLOXACIN: SIGNIFICANT CHANGE UP
-  ERTAPENEM: SIGNIFICANT CHANGE UP
-  GENTAMICIN: SIGNIFICANT CHANGE UP
-  IMIPENEM: SIGNIFICANT CHANGE UP
-  LEVOFLOXACIN: SIGNIFICANT CHANGE UP
-  MEROPENEM: SIGNIFICANT CHANGE UP
-  PIPERACILLIN/TAZOBACTAM: SIGNIFICANT CHANGE UP
-  TOBRAMYCIN: SIGNIFICANT CHANGE UP
-  TRIMETHOPRIM/SULFAMETHOXAZOLE: SIGNIFICANT CHANGE UP
ANION GAP SERPL CALC-SCNC: 14 MMOL/L — SIGNIFICANT CHANGE UP (ref 5–17)
APTT BLD: 29.5 SEC — SIGNIFICANT CHANGE UP (ref 27.5–35.5)
BLD GP AB SCN SERPL QL: NEGATIVE — SIGNIFICANT CHANGE UP
BUN SERPL-MCNC: 58 MG/DL — HIGH (ref 7–23)
CALCIUM SERPL-MCNC: 9.4 MG/DL — SIGNIFICANT CHANGE UP (ref 8.4–10.5)
CHLORIDE SERPL-SCNC: 105 MMOL/L — SIGNIFICANT CHANGE UP (ref 96–108)
CO2 SERPL-SCNC: 21 MMOL/L — LOW (ref 22–31)
CREAT SERPL-MCNC: 2.7 MG/DL — HIGH (ref 0.5–1.3)
EGFR: 24 ML/MIN/1.73M2 — LOW
FERRITIN SERPL-MCNC: 401 NG/ML — HIGH (ref 30–400)
GLUCOSE BLDC GLUCOMTR-MCNC: 149 MG/DL — HIGH (ref 70–99)
GLUCOSE BLDC GLUCOMTR-MCNC: 161 MG/DL — HIGH (ref 70–99)
GLUCOSE BLDC GLUCOMTR-MCNC: 208 MG/DL — HIGH (ref 70–99)
GLUCOSE BLDC GLUCOMTR-MCNC: 220 MG/DL — HIGH (ref 70–99)
GLUCOSE SERPL-MCNC: 148 MG/DL — HIGH (ref 70–99)
HAPTOGLOB SERPL-MCNC: 223 MG/DL — HIGH (ref 34–200)
HCT VFR BLD CALC: 28.4 % — LOW (ref 39–50)
HGB BLD-MCNC: 9.7 G/DL — LOW (ref 13–17)
INR BLD: 1.29 RATIO — HIGH (ref 0.88–1.16)
IRON SATN MFR SERPL: 23 % — SIGNIFICANT CHANGE UP (ref 16–55)
IRON SATN MFR SERPL: 38 UG/DL — LOW (ref 45–165)
LDH SERPL L TO P-CCNC: 134 U/L — SIGNIFICANT CHANGE UP (ref 50–242)
MAGNESIUM SERPL-MCNC: 1.8 MG/DL — SIGNIFICANT CHANGE UP (ref 1.6–2.6)
MCHC RBC-ENTMCNC: 26.4 PG — LOW (ref 27–34)
MCHC RBC-ENTMCNC: 34.2 GM/DL — SIGNIFICANT CHANGE UP (ref 32–36)
MCV RBC AUTO: 77.2 FL — LOW (ref 80–100)
METHOD TYPE: SIGNIFICANT CHANGE UP
NRBC # BLD: 0 /100 WBCS — SIGNIFICANT CHANGE UP (ref 0–0)
PHOSPHATE SERPL-MCNC: 3.7 MG/DL — SIGNIFICANT CHANGE UP (ref 2.5–4.5)
PLATELET # BLD AUTO: 300 K/UL — SIGNIFICANT CHANGE UP (ref 150–400)
POTASSIUM SERPL-MCNC: 4.5 MMOL/L — SIGNIFICANT CHANGE UP (ref 3.5–5.3)
POTASSIUM SERPL-SCNC: 4.5 MMOL/L — SIGNIFICANT CHANGE UP (ref 3.5–5.3)
PROTHROM AB SERPL-ACNC: 14.9 SEC — HIGH (ref 10.5–13.4)
RBC # BLD: 3.68 M/UL — LOW (ref 4.2–5.8)
RBC # BLD: 3.68 M/UL — LOW (ref 4.2–5.8)
RBC # FLD: 16.1 % — HIGH (ref 10.3–14.5)
RETICS #: 74.3 K/UL — SIGNIFICANT CHANGE UP (ref 25–125)
RETICS/RBC NFR: 2 % — SIGNIFICANT CHANGE UP (ref 0.5–2.5)
RH IG SCN BLD-IMP: POSITIVE — SIGNIFICANT CHANGE UP
SARS-COV-2 RNA SPEC QL NAA+PROBE: SIGNIFICANT CHANGE UP
SARS-COV-2 RNA SPEC QL NAA+PROBE: SIGNIFICANT CHANGE UP
SODIUM SERPL-SCNC: 140 MMOL/L — SIGNIFICANT CHANGE UP (ref 135–145)
TIBC SERPL-MCNC: 165 UG/DL — LOW (ref 220–430)
UIBC SERPL-MCNC: 127 UG/DL — SIGNIFICANT CHANGE UP (ref 110–370)
WBC # BLD: 11.33 K/UL — HIGH (ref 3.8–10.5)
WBC # FLD AUTO: 11.33 K/UL — HIGH (ref 3.8–10.5)

## 2023-04-07 PROCEDURE — 99233 SBSQ HOSP IP/OBS HIGH 50: CPT

## 2023-04-07 PROCEDURE — 37226: CPT

## 2023-04-07 PROCEDURE — 99233 SBSQ HOSP IP/OBS HIGH 50: CPT | Mod: GC

## 2023-04-07 PROCEDURE — 99232 SBSQ HOSP IP/OBS MODERATE 35: CPT

## 2023-04-07 PROCEDURE — 75710 ARTERY X-RAYS ARM/LEG: CPT | Mod: 26,59

## 2023-04-07 PROCEDURE — 76937 US GUIDE VASCULAR ACCESS: CPT | Mod: 26

## 2023-04-07 RX ORDER — SODIUM CHLORIDE 9 MG/ML
1000 INJECTION INTRAMUSCULAR; INTRAVENOUS; SUBCUTANEOUS
Refills: 0 | Status: DISCONTINUED | OUTPATIENT
Start: 2023-04-07 | End: 2023-04-07

## 2023-04-07 RX ORDER — SODIUM CHLORIDE 9 MG/ML
1000 INJECTION INTRAMUSCULAR; INTRAVENOUS; SUBCUTANEOUS
Refills: 0 | Status: DISCONTINUED | OUTPATIENT
Start: 2023-04-07 | End: 2023-04-11

## 2023-04-07 RX ORDER — ASPIRIN/CALCIUM CARB/MAGNESIUM 324 MG
81 TABLET ORAL DAILY
Refills: 0 | Status: DISCONTINUED | OUTPATIENT
Start: 2023-04-08 | End: 2023-04-12

## 2023-04-07 RX ADMIN — PANTOPRAZOLE SODIUM 40 MILLIGRAM(S): 20 TABLET, DELAYED RELEASE ORAL at 06:17

## 2023-04-07 RX ADMIN — ISOSORBIDE MONONITRATE 30 MILLIGRAM(S): 60 TABLET, EXTENDED RELEASE ORAL at 16:51

## 2023-04-07 RX ADMIN — Medication 2: at 16:50

## 2023-04-07 RX ADMIN — SODIUM CHLORIDE 75 MILLILITER(S): 9 INJECTION INTRAMUSCULAR; INTRAVENOUS; SUBCUTANEOUS at 15:38

## 2023-04-07 RX ADMIN — Medication 30 MILLIGRAM(S): at 06:17

## 2023-04-07 RX ADMIN — ATORVASTATIN CALCIUM 40 MILLIGRAM(S): 80 TABLET, FILM COATED ORAL at 21:32

## 2023-04-07 RX ADMIN — Medication 50 MILLIGRAM(S): at 06:17

## 2023-04-07 RX ADMIN — CILOSTAZOL 100 MILLIGRAM(S): 100 TABLET ORAL at 06:17

## 2023-04-07 RX ADMIN — CILOSTAZOL 100 MILLIGRAM(S): 100 TABLET ORAL at 17:30

## 2023-04-07 NOTE — PROGRESS NOTE ADULT - PROBLEM SELECTOR PLAN 2
Pt. with anemia. Check iron studies. Monitor H/H. Transfuse as per primary team.     If any questions, please feel free to contact me     Jerry Gamble  Nephrology Fellow  [facility] Pager: [phone].

## 2023-04-07 NOTE — PROGRESS NOTE ADULT - PROBLEM SELECTOR PLAN 2
VA arterial duplex showing mod severe dz.   further eval by vasc/vasc-card.   Pod plans for intervention pending vasc w/u  ID/card/renal clearance prior to intervention.  renal US and echo pending.

## 2023-04-07 NOTE — PROGRESS NOTE ADULT - SUBJECTIVE AND OBJECTIVE BOX
Cardiovascular Disease Progress Note  Date of service: 04-07-23 @ 11:56  Covering for Dr. Liu  Overnight events: No acute events overnight.  Pt is in no distress.   Otherwise review of systems negative    Objective Findings:  T(C): 37.1 (04-07-23 @ 10:45), Max: 37.1 (04-07-23 @ 10:45)  HR: 79 (04-07-23 @ 10:45) (79 - 98)  BP: 163/82 (04-07-23 @ 10:45) (129/75 - 163/82)  RR: 16 (04-07-23 @ 10:45) (16 - 18)  SpO2: 99% (04-07-23 @ 10:45) (94% - 100%)  Wt(kg): --  Daily Height in cm: 172.72 (07 Apr 2023 07:22)    Daily       Physical Exam:  Gen: NAD; Patient resting comfortably  HEENT: EOMI, Normocephalic/ atraumatic  CV: RRR, normal S1 + S2, no m/r/g  Lungs:  Normal respiratory effort; clear to auscultation bilaterally  Abd: soft, non-tender; bowel sounds present  Ext: Bandage C/d/i    Telemetry: Sinus     Laboratory Data:                        9.7    11.33 )-----------( 300      ( 07 Apr 2023 05:55 )             28.4     04-07    140  |  105  |  58<H>  ----------------------------<  148<H>  4.5   |  21<L>  |  2.70<H>    Ca    9.4      07 Apr 2023 05:55  Phos  3.7     04-07  Mg     1.8     04-07      PT/INR - ( 07 Apr 2023 05:55 )   PT: 14.9 sec;   INR: 1.29 ratio         PTT - ( 07 Apr 2023 05:55 )  PTT:29.5 sec          Inpatient Medications:  MEDICATIONS  (STANDING):  atorvastatin 40 milliGRAM(s) Oral at bedtime  chlorhexidine 2% Cloths 1 Application(s) Topical daily  cilostazol 100 milliGRAM(s) Oral two times a day  clopidogrel Tablet 75 milliGRAM(s) Oral daily  dextrose 5%. 1000 milliLiter(s) (100 mL/Hr) IV Continuous <Continuous>  dextrose 5%. 1000 milliLiter(s) (50 mL/Hr) IV Continuous <Continuous>  dextrose 50% Injectable 25 Gram(s) IV Push once  dextrose 50% Injectable 12.5 Gram(s) IV Push once  dextrose 50% Injectable 25 Gram(s) IV Push once  glucagon  Injectable 1 milliGRAM(s) IntraMuscular once  heparin   Injectable 5000 Unit(s) SubCutaneous every 8 hours  insulin lispro (ADMELOG) corrective regimen sliding scale   SubCutaneous three times a day before meals  insulin lispro (ADMELOG) corrective regimen sliding scale   SubCutaneous at bedtime  isosorbide   mononitrate ER Tablet (IMDUR) 30 milliGRAM(s) Oral daily  metoprolol succinate ER 50 milliGRAM(s) Oral daily  NIFEdipine XL 30 milliGRAM(s) Oral daily  pantoprazole    Tablet 40 milliGRAM(s) Oral before breakfast  sodium chloride 0.9%. 1000 milliLiter(s) (75 mL/Hr) IV Continuous <Continuous>      Assessment: 73 y/o M--patient with a history of type 2 DM complicated with CKD4, PAD with unclear past stenting hx in the RLE, essential HTN tobacco use,  transferred from [facility] 3/30/23 with spouse reporting the patient had lost his R hallux toenail about a week prior to that admission and noted dark discoloration of the R hallux and 2nd toe with foul odor emanating from the R foot    Plan of Care:    #Pre-op risk stratification  - Tentative planning for podiatric and vascualar intervention   - Pt displays no evidence of pre-op coronary ischemia  - TTE shows normal LV systolic function with no significant structural abnormalities.   - From a cardiac standpoint, Pt is optimized for vascular angiogram today 4/7/2023  - Pt is intermediate to moderate risk for desired procedure  - Further recommendations to follow pending clinical course.     #PAD  - Continue Cilostazol  - Vascular input appreciated    #CKD  - Renal input appreciated    #CAD  - S/p PCI   -Continue Plavix, statin and BB            Over 25 minutes spent on total encounter; more than 50% of the visit was spent counseling and/or coordinating care by the attending physician.      Luis E Payan DO [facility]  Cardiovascular Disease  [phone]

## 2023-04-07 NOTE — PROGRESS NOTE ADULT - SUBJECTIVE AND OBJECTIVE BOX
Vascular Cardiology Attending Progress Note    DIRECT PROVIDER NUMBER: [phone]     CC: LE wounds / transferred from [facility] for possible vascular intervention    Overnight no events.    Taken to lab this afternoon, s/p angiography, DCBA and stent to the distal RSFA. Used CO2 aortography; only used 12ccs of dye. Got 75mg plavix and loaded with 324mg ASA. Dopplerable PT/DP at the end of the case. LCFA sealed with Mynx.     MEDICATIONS:  cilostazol 100 milliGRAM(s) Oral two times a day  clopidogrel Tablet 75 milliGRAM(s) Oral daily  heparin   Injectable 5000 Unit(s) SubCutaneous every 8 hours  isosorbide   mononitrate ER Tablet (IMDUR) 30 milliGRAM(s) Oral daily  NIFEdipine XL 30 milliGRAM(s) Oral daily  acetaminophen     Tablet .. 650 milliGRAM(s) Oral every 6 hours PRN  glucagon  Injectable 1 milliGRAM(s) IntraMuscular once  insulin lispro (ADMELOG) corrective regimen sliding scale   SubCutaneous three times a day before meals  insulin lispro (ADMELOG) corrective regimen sliding scale   SubCutaneous at bedtime  chlorhexidine 2% Cloths 1 Application(s) Topical daily  dextrose 5%. 1000 milliLiter(s) IV Continuous <Continuous>  dextrose 5%. 1000 milliLiter(s) IV Continuous <Continuous>    PHYSICAL EXAM:  Vital Signs Last 24 Hrs  T(C): 37.1 (07 Apr 2023 10:45), Max: 37.1 (07 Apr 2023 10:45)  T(F): 98.7 (07 Apr 2023 10:45), Max: 98.7 (07 Apr 2023 10:45)  HR: 70 (07 Apr 2023 14:15) (70 - 98)  BP: 177/79 (07 Apr 2023 14:15) (129/75 - 177/79)  BP(mean): 98 (07 Apr 2023 10:37) (98 - 98)  RR: 18 (07 Apr 2023 14:15) (16 - 18)  SpO2: 100% (07 Apr 2023 14:15) (94% - 100%)    Parameters below as of 07 Apr 2023 14:15  Patient On (Oxygen Delivery Method): room air    Appearance: NAD 	  HEENT:  NC/AT  Cardiovascular: RRR, S1 and S2  Respiratory: CTA B/L  Psychiatry:  AAO x 3  Gastrointestinal:  Soft, Non-tender, + BS	  Skin: No cyanosis	  Neurologic: No focal deficits noted  Extremities: no LE edema  Vascular Pulse Exam: Audible bilateral DP and PT signals  Foot Exam:  Left foot distal lateral hallux wound, 2nd digit dry gangrene  Right foot 2nd and 3rd digit dry gangrene, dorsal medial midfoot dry gangrene    LABS:	 	  Reviewed    MILLIE and Arterial duplex:   Findings:    The right MILLIE equals 0.50; the left MILLIE equals 0.58.    Right and left inguinal lymph nodes are imaged.    Atheromatous disease of varying severity is present along the course of   both the right and left lower extremity arteries.    There is a short, patent stent in the proximal right superficial femoral   artery.    There is a nonimaged flow-limiting stenosis or occlusion of the right   superficial femoral artery in the distal thigh with tardus parvus flow   through the right popliteal artery.    The peak systolic velocities of the Right lower extremity are as follows:    Distal external iliac artery: 83 cm/s  Common femoral artery: 194 cm/s  Deep femoral artery: 88 cm/s  Superficial femoral artery: 79 cm/s proximal,  70 cm/s mid, 69 cm/s distal  Popliteal artery: Tardus parvus, 32 cm/s  Tibioperoneal trunk: 35 cm/s  Posterior tibial artery: 32 cm/s proximal,  26 cm/s mid, 26 cm/s distal  Peroneal artery: 26 cm/s proximal,  23 cm/s mid, 31 cm/s distal  Anterior tibial artery: 29 cm/s proximal,  46 cm/s mid, 25 cm/s distal  Dorsalis pedis artery: 33 cm/s    The peak systolic velocities of the Left lower extremity are as follows:    Distal external iliac artery: 130 cm/s  Common femoral artery: 130 cm/s  Deep femoral artery: 96 cm/s  Superficial femoral artery: OCCLUDED proximal,  43 cm/s mid, 22 cm/s   distal  Popliteal artery: 32 cm/s  Tibioperoneal trunk: 24 cm/s  Posterior tibial artery: 25 cm/s proximal,  30 cm/s mid, 25 cm/s distal  Peroneal artery: 25 cm/s proximal,  15 cm/s mid, 15 cm/s distal  Anterior tibial artery: 18 cm/s proximal,  23 cm/s mid, 12 cm/s distal  Dorsalis pedis artery: OCCLUDED    Impression:  On the right, there is a flow-limiting stenosis of the common femoral  There is a nonimaged flow-limiting stenosis or occlusion of the right   superficial femoral artery in the distal thigh.    There is a segmental occlusion of the left superficial femoral artery in   the proximal thigh.  The left dorsal pedis artery is occluded.    Assessment:  1. PAD with CLI [place] Classification 6 with bilateral  of the SFAs, s/p DCBA and stent to the distal RSFA 4/7/23   2. CAD s/p prior PCI  3. HTN  4. HLD  5. DM  6. CKD stage 4  7. Current Smoker      Plan:  1. Monitor access site. Bedrest 4 hours.    2. Return to cath lab Monday afternoon for planned intervention of the LSFA. He can eat breakfast. Please start hydration in the morning.   3. Appreciate Renal, Podiatry and Vascular surgery.   4. Statin therapy if no contraindication.   5. Continue plavix; add 81mg asa.      Please call with any questions:   DIRECT SERVICE NUMBER: [phone]

## 2023-04-07 NOTE — PROGRESS NOTE ADULT - SUBJECTIVE AND OBJECTIVE BOX
Patient is a 72y old  Male who presents with a chief complaint of Transferred from [facility] for possible vascular intervention. (07 Apr 2023 06:57)       INTERVAL HPI/OVERNIGHT EVENTS:  Patient seen and evaluated at bedside.  Pt is resting comfortable in NAD. Denies N/V/F/C.  Pain rated at X/10    Allergies    penicillin (Unknown)    Intolerances        Vital Signs Last 24 Hrs  T(C): 36.9 (07 Apr 2023 10:37), Max: 37 (06 Apr 2023 21:07)  T(F): 98.4 (07 Apr 2023 10:37), Max: 98.6 (06 Apr 2023 21:07)  HR: 83 (07 Apr 2023 10:37) (82 - 98)  BP: 134/79 (07 Apr 2023 10:37) (123/72 - 149/80)  BP(mean): 98 (07 Apr 2023 10:37) (98 - 98)  RR: 18 (07 Apr 2023 10:37) (18 - 18)  SpO2: 100% (07 Apr 2023 10:37) (94% - 100%)    Parameters below as of 07 Apr 2023 10:37  Patient On (Oxygen Delivery Method): room air        LABS:                        9.7    11.33 )-----------( 300      ( 07 Apr 2023 05:55 )             28.4     04-07    140  |  105  |  58<H>  ----------------------------<  148<H>  4.5   |  21<L>  |  2.70<H>    Ca    9.4      07 Apr 2023 05:55  Phos  3.7     04-07  Mg     1.8     04-07      PT/INR - ( 07 Apr 2023 05:55 )   PT: 14.9 sec;   INR: 1.29 ratio         PTT - ( 07 Apr 2023 05:55 )  PTT:29.5 sec    CAPILLARY BLOOD GLUCOSE      POCT Blood Glucose.: 161 mg/dL (07 Apr 2023 07:36)  POCT Blood Glucose.: 200 mg/dL (06 Apr 2023 22:00)  POCT Blood Glucose.: 227 mg/dL (06 Apr 2023 16:49)  POCT Blood Glucose.: 216 mg/dL (06 Apr 2023 12:21)      Lower Extremity Physical Exam:  Vascular: DP/PT 0/4, B/L, CFT <3 seconds B/L, Temperature gradient warm to cool, B/L.   Neuro: Epicritic sensation diminished to the level of the toes, B/L.  Musculoskeletal/Ortho: Unremarkable.  Skin: Left foot distal lateral hallux wound to bone, fibronecrotic wound bed with surrounding ischemic changes, mild malodor, no erythema, 2nd digit ischemic changes to PIPJ, digits 3-4 distal plantar ischemic changes. Right hallux dry gangrene to the level of IPJ, R foot 2nd digit dry gangrene to MPJ, ischemic changes with hyperpigmentation noted to dorsal midfoot, R dorsomedial eschar to subQ, well adhered edges, R foot lateral 5th met eschar with well adhered edges, no boggyness, no wet conversion.     RADIOLOGY & ADDITIONAL TESTS:

## 2023-04-07 NOTE — PROGRESS NOTE ADULT - PROBLEM SELECTOR PLAN 1
Pt. with elevated serum creatinine; unclear at this time whether or not this is CKD vs PRIYA. No reported history of kidney disease but documented in chart that he has CKD stage 4. No previous labs available for review but on admission pt noted to have a SCr of 2.64; which had improved to 2.4 but now increased to 2.7 today. UA reviewed without evidence of significant hematuria or proteinuria. Agree with holding ACE/ARB at this time. Renal sonogram without evidence of hydronephrosis. Monitor for urinary retention. Monitor labs and urine output.   If patient is to go for any procedures requiring IV contrast would recommend IVF hydration with NS at 75cc/hr for 6 hours before and after the procedure. Dose medications as per eGFR.

## 2023-04-07 NOTE — PROGRESS NOTE ADULT - PROBLEM SELECTOR PLAN 3
Unclear baseline.   will cont to monitor overall trend , outpt, lytes, weight.   renal eval appreciated . renal US. Unclear baseline.   will cont to monitor overall trend , outpt, lytes, weight.   renal eval appreciated . renal US- no hydro

## 2023-04-07 NOTE — PROGRESS NOTE ADULT - PROBLEM SELECTOR PLAN 1
Pt with stay at OSH on iV abx seen by pod and ID there  ID rec appreciated - monitor off abx   wound cultures sent by pod. will follow  no clinical signs and symptoms of overt systemic infection.   Plan for peripheral LE angio today per vascular cardiology  f/u pod, renal, card plans

## 2023-04-07 NOTE — PROGRESS NOTE ADULT - ASSESSMENT
72M presents with bilateral foot dry gangrene and ischemic changes.  - Pt seen and evaluated.  - Afebrile, WBC 11.33  - Left foot distal lateral hallux wound to bone, fibronecrotic wound bed with surrounding ischemic changes, mild malodor, no erythema, 2nd digit ischemic changes to PIPJ, digits 3-4 distal plantar ischemic changes. Right hallux dry gangrene to the level of IPJ, R foot 2nd digit dry gangrene to MPJ, ischemic changes with hyperpigmentation noted to dorsal midfoot, R dorsomedial eschar to subQ, well adhered edges, R foot lateral 5th met eschar with well adhered edges, no boggyness, no wet conversion.   - Bilateral foot xray: b/l 1st distal phalanx OM, right foot 2nd digit distal phalanx OM   - Left foot wound culture citrobacter freundii prelim   - Continue IV Vanco and Cefepime  - Recommend continue B/L z-flows to be worn at all times while in bed  - MILLIE/PVR: R MILLIE 0.50, L MILLIE 0.58, waveforms diminished @ calf b/l   - Angiogram planned for today, recs appreciated   - Pod plan local wound care vs BL partial first ray and second ray amputation pending [facility] recs   - Please document medical/cardiac clearance for podiatric surgery under anesthesia.  - Discussed with attending

## 2023-04-07 NOTE — PROGRESS NOTE ADULT - SUBJECTIVE AND OBJECTIVE BOX
Mohsin Khan, MD  Attending Physician, Division Of Hospital Medicine  Pager: [phone], Office: [phone]  Off hour pager: [phone]    Patient is a 72y old  Male who presents with a chief complaint of Transferred from [facility] for possible vascular intervention.     SUBJECTIVE / OVERNIGHT EVENTS:  Seen, examined the patient this am      MEDICATIONS  (STANDING):  atorvastatin 40 milliGRAM(s) Oral at bedtime  chlorhexidine 2% Cloths 1 Application(s) Topical daily  cilostazol 100 milliGRAM(s) Oral two times a day  clopidogrel Tablet 75 milliGRAM(s) Oral daily  dextrose 5%. 1000 milliLiter(s) (100 mL/Hr) IV Continuous <Continuous>  dextrose 5%. 1000 milliLiter(s) (50 mL/Hr) IV Continuous <Continuous>  dextrose 50% Injectable 25 Gram(s) IV Push once  dextrose 50% Injectable 12.5 Gram(s) IV Push once  dextrose 50% Injectable 25 Gram(s) IV Push once  glucagon  Injectable 1 milliGRAM(s) IntraMuscular once  heparin   Injectable 5000 Unit(s) SubCutaneous every 8 hours  insulin lispro (ADMELOG) corrective regimen sliding scale   SubCutaneous three times a day before meals  insulin lispro (ADMELOG) corrective regimen sliding scale   SubCutaneous at bedtime  isosorbide   mononitrate ER Tablet (IMDUR) 30 milliGRAM(s) Oral daily  metoprolol succinate ER 50 milliGRAM(s) Oral daily  NIFEdipine XL 30 milliGRAM(s) Oral daily  pantoprazole    Tablet 40 milliGRAM(s) Oral before breakfast  sodium chloride 0.9%. 1000 milliLiter(s) (75 mL/Hr) IV Continuous <Continuous>    MEDICATIONS  (PRN):  acetaminophen     Tablet .. 650 milliGRAM(s) Oral every 6 hours PRN Temp greater or equal to 38C (100.4F), Mild Pain (1 - 3)  dextrose Oral Gel 15 Gram(s) Oral once PRN Blood Glucose LESS THAN 70 milliGRAM(s)/deciliter      Vital Signs Last 24 Hrs  T(C): 37.1 (07 Apr 2023 10:45), Max: 37.1 (07 Apr 2023 10:45)  T(F): 98.7 (07 Apr 2023 10:45), Max: 98.7 (07 Apr 2023 10:45)  HR: 79 (07 Apr 2023 10:45) (79 - 98)  BP: 163/82 (07 Apr 2023 10:45) (129/75 - 163/82)  BP(mean): 98 (07 Apr 2023 10:37) (98 - 98)  RR: 16 (07 Apr 2023 10:45) (16 - 18)  SpO2: 99% (07 Apr 2023 10:45) (94% - 100%)    Parameters below as of 07 Apr 2023 10:45  Patient On (Oxygen Delivery Method): room air      CAPILLARY BLOOD GLUCOSE      POCT Blood Glucose.: 161 mg/dL (07 Apr 2023 07:36)  POCT Blood Glucose.: 200 mg/dL (06 Apr 2023 22:00)  POCT Blood Glucose.: 227 mg/dL (06 Apr 2023 16:49)  POCT Blood Glucose.: 216 mg/dL (06 Apr 2023 12:21)    I&O's Summary    06 Apr 2023 07:01  -  07 Apr 2023 07:00  --------------------------------------------------------  IN: 120 mL / OUT: 200 mL / NET: -80 mL    07 Apr 2023 07:01  -  07 Apr 2023 11:52  --------------------------------------------------------  IN: 0 mL / OUT: 420 mL / NET: -420 mL        PHYSICAL EXAM:-  CONSTITUTIONAL: NAD   EYES:  conjunctiva and sclera clear  ENMT: Moist oral mucosa  NECK: Supple, no palpable masses; no JVD  RESPIRATORY: Normal respiratory effort; lungs are clear to auscultation bilaterally  CARDIOVASCULAR: Regular rate and rhythm, normal S1 and S2, no murmur/rub/gallop;  lower extremity edema  ABDOMEN: Nontender to palpation, normoactive bowel sounds, no rebound/guarding  MUSCULOSKELETAL:  no clubbing or cyanosis of digits; no joint swelling or tenderness to palpation  PSYCH: A+O to person, place, and time; affect appropriate  SKIN: bilateral foot wrapped in gauze c/d/i . malodorous, tender to touch  LABS:                        9.7    11.33 )-----------( 300      ( 07 Apr 2023 05:55 )             28.4     04-07    140  |  105  |  58<H>  ----------------------------<  148<H>  4.5   |  21<L>  |  2.70<H>    Ca    9.4      07 Apr 2023 05:55  Phos  3.7     04-07  Mg     1.8     04-07      PT/INR - ( 07 Apr 2023 05:55 )   PT: 14.9 sec;   INR: 1.29 ratio         PTT - ( 07 Apr 2023 05:55 )  PTT:29.5 sec    RADIOLOGY & ADDITIONAL TESTS:    Imaging Personally Reviewed: CXR, Echo  Consultant(s) Notes Reviewed: Vascular Card, Podiatry      Mohsin Khan, MD  Attending Physician, Division Of Hospital Medicine  Pager: [phone], Office: [phone]  Off hour pager: [phone]    Patient is a 72y old  Male who presents with a chief complaint of Transferred from [facility] for possible vascular intervention.     SUBJECTIVE / OVERNIGHT EVENTS:  Seen, examined the patient this am  No overnight events, VSS, NPO      MEDICATIONS  (STANDING):  atorvastatin 40 milliGRAM(s) Oral at bedtime  chlorhexidine 2% Cloths 1 Application(s) Topical daily  cilostazol 100 milliGRAM(s) Oral two times a day  clopidogrel Tablet 75 milliGRAM(s) Oral daily  dextrose 5%. 1000 milliLiter(s) (100 mL/Hr) IV Continuous <Continuous>  dextrose 5%. 1000 milliLiter(s) (50 mL/Hr) IV Continuous <Continuous>  dextrose 50% Injectable 25 Gram(s) IV Push once  dextrose 50% Injectable 12.5 Gram(s) IV Push once  dextrose 50% Injectable 25 Gram(s) IV Push once  glucagon  Injectable 1 milliGRAM(s) IntraMuscular once  heparin   Injectable 5000 Unit(s) SubCutaneous every 8 hours  insulin lispro (ADMELOG) corrective regimen sliding scale   SubCutaneous three times a day before meals  insulin lispro (ADMELOG) corrective regimen sliding scale   SubCutaneous at bedtime  isosorbide   mononitrate ER Tablet (IMDUR) 30 milliGRAM(s) Oral daily  metoprolol succinate ER 50 milliGRAM(s) Oral daily  NIFEdipine XL 30 milliGRAM(s) Oral daily  pantoprazole    Tablet 40 milliGRAM(s) Oral before breakfast  sodium chloride 0.9%. 1000 milliLiter(s) (75 mL/Hr) IV Continuous <Continuous>    MEDICATIONS  (PRN):  acetaminophen     Tablet .. 650 milliGRAM(s) Oral every 6 hours PRN Temp greater or equal to 38C (100.4F), Mild Pain (1 - 3)  dextrose Oral Gel 15 Gram(s) Oral once PRN Blood Glucose LESS THAN 70 milliGRAM(s)/deciliter      Vital Signs Last 24 Hrs  T(C): 37.1 (07 Apr 2023 10:45), Max: 37.1 (07 Apr 2023 10:45)  T(F): 98.7 (07 Apr 2023 10:45), Max: 98.7 (07 Apr 2023 10:45)  HR: 79 (07 Apr 2023 10:45) (79 - 98)  BP: 163/82 (07 Apr 2023 10:45) (129/75 - 163/82)  BP(mean): 98 (07 Apr 2023 10:37) (98 - 98)  RR: 16 (07 Apr 2023 10:45) (16 - 18)  SpO2: 99% (07 Apr 2023 10:45) (94% - 100%)    Parameters below as of 07 Apr 2023 10:45  Patient On (Oxygen Delivery Method): room air      CAPILLARY BLOOD GLUCOSE      POCT Blood Glucose.: 161 mg/dL (07 Apr 2023 07:36)  POCT Blood Glucose.: 200 mg/dL (06 Apr 2023 22:00)  POCT Blood Glucose.: 227 mg/dL (06 Apr 2023 16:49)  POCT Blood Glucose.: 216 mg/dL (06 Apr 2023 12:21)    I&O's Summary    06 Apr 2023 07:01  -  07 Apr 2023 07:00  --------------------------------------------------------  IN: 120 mL / OUT: 200 mL / NET: -80 mL    07 Apr 2023 07:01  -  07 Apr 2023 11:52  --------------------------------------------------------  IN: 0 mL / OUT: 420 mL / NET: -420 mL        PHYSICAL EXAM:-  CONSTITUTIONAL: NAD   EYES:  conjunctiva and sclera clear  ENMT: Moist oral mucosa  NECK: Supple, no palpable masses; no JVD  RESPIRATORY: Normal respiratory effort; lungs are clear to auscultation bilaterally  CARDIOVASCULAR: Regular rate and rhythm, normal S1 and S2, no murmur/rub/gallop;  lower extremity edema  ABDOMEN: Nontender to palpation, normoactive bowel sounds, no rebound/guarding  MUSCULOSKELETAL:  no clubbing or cyanosis of digits; no joint swelling or tenderness to palpation  PSYCH: A+O to person, place, and time; affect appropriate  SKIN: bilateral foot wrapped in gauze c/d/i . malodorous, tender to touch  LABS:                        9.7    11.33 )-----------( 300      ( 07 Apr 2023 05:55 )             28.4     04-07    140  |  105  |  58<H>  ----------------------------<  148<H>  4.5   |  21<L>  |  2.70<H>    Ca    9.4      07 Apr 2023 05:55  Phos  3.7     04-07  Mg     1.8     04-07      PT/INR - ( 07 Apr 2023 05:55 )   PT: 14.9 sec;   INR: 1.29 ratio         PTT - ( 07 Apr 2023 05:55 )  PTT:29.5 sec    RADIOLOGY & ADDITIONAL TESTS:    Imaging Personally Reviewed: CXR, Echo  Consultant(s) Notes Reviewed: Vascular Card, Podiatry

## 2023-04-07 NOTE — PROGRESS NOTE ADULT - ATTENDING COMMENTS
transfer to [facility] for pvd/om/further vasc workup  #ckd stage 1v  cr stable   serologic workup to be sent  ua neg prot/blood  check bladder scan and r/o retention  #anemia  check iron studies and ferritin  #htn-cont bp meds  #for angiogram  pt understands risk of DEBBIE, contrast  will give IVF as above pre and post procedures transfer to [facility] for pvd/om/further vasc workup  #ckd stage 1v  cr stable / mild uptrend today  serologic workup to be sent  ua neg prot/blood  check bladder scan and r/o retention  #anemia  check iron studies and ferritin  #htn-cont bp meds  #for angiogram  pt understands risk of DEBBIE, contrast  will give IVF as above pre and post procedures

## 2023-04-07 NOTE — PROGRESS NOTE ADULT - SUBJECTIVE AND OBJECTIVE BOX
[facility] DIVISION OF KIDNEY DISEASES AND HYPERTENSION -- FOLLOW UP NOTE  --------------------------------------------------------------------------------  72 year old male with DM, PAD, HTN and CKD who presented to a OSH with AMS and noted to have R hallux and 2nd toe dry gangrene and LEFT hallux osteomyelitis. The patient was initially treated with IV ABx however transferred to [facility] for further evaluation. The nephrology team was consulted for elevated Serum creatinine levels and possible need for vascular procedures during the hospital course. On admission pt noted to have a SCr of 2.64.     24 hour events/subjective:  Pt. seen and examined earlier today.  Denied any chest pain, shortness of breath, nausea, vomiting, or abdominal pain.   No acute issues noted overnight.          PAST HISTORY  --------------------------------------------------------------------------------  No significant changes to PMH, PSH, FHx, SHx, unless otherwise noted    ALLERGIES & MEDICATIONS  --------------------------------------------------------------------------------  Allergies    penicillin (Unknown)    Intolerances      Standing Inpatient Medications  atorvastatin 40 milliGRAM(s) Oral at bedtime  chlorhexidine 2% Cloths 1 Application(s) Topical daily  cilostazol 100 milliGRAM(s) Oral two times a day  clopidogrel Tablet 75 milliGRAM(s) Oral daily  dextrose 5%. 1000 milliLiter(s) IV Continuous <Continuous>  dextrose 5%. 1000 milliLiter(s) IV Continuous <Continuous>  dextrose 50% Injectable 25 Gram(s) IV Push once  dextrose 50% Injectable 12.5 Gram(s) IV Push once  dextrose 50% Injectable 25 Gram(s) IV Push once  glucagon  Injectable 1 milliGRAM(s) IntraMuscular once  heparin   Injectable 5000 Unit(s) SubCutaneous every 8 hours  insulin lispro (ADMELOG) corrective regimen sliding scale   SubCutaneous three times a day before meals  insulin lispro (ADMELOG) corrective regimen sliding scale   SubCutaneous at bedtime  isosorbide   mononitrate ER Tablet (IMDUR) 30 milliGRAM(s) Oral daily  metoprolol succinate ER 50 milliGRAM(s) Oral daily  NIFEdipine XL 30 milliGRAM(s) Oral daily  pantoprazole    Tablet 40 milliGRAM(s) Oral before breakfast    PRN Inpatient Medications  acetaminophen     Tablet .. 650 milliGRAM(s) Oral every 6 hours PRN  dextrose Oral Gel 15 Gram(s) Oral once PRN      REVIEW OF SYSTEMS    All other systems were reviewed and are negative, except as noted.    VITALS/PHYSICAL EXAM  --------------------------------------------------------------------------------  T(C): 36.8 (04-07-23 @ 04:08), Max: 37 (04-06-23 @ 21:07)  HR: 86 (04-07-23 @ 04:08) (82 - 98)  BP: 149/80 (04-07-23 @ 04:08) (123/72 - 149/80)  RR: 18 (04-07-23 @ 04:08) (18 - 18)  SpO2: 98% (04-07-23 @ 04:08) (94% - 98%)  Wt(kg): --        04-05-23 @ 07:01  -  04-06-23 @ 07:00  --------------------------------------------------------  IN: 0 mL / OUT: 250 mL / NET: -250 mL    04-06-23 @ 07:01  -  04-07-23 @ 06:57  --------------------------------------------------------  IN: 120 mL / OUT: 200 mL / NET: -80 mL        Physical Exam:  	Gen: NAD  	HEENT: MMM  	Pulm: CTA B/L  	CV: S1S2  	Abd: Soft, +BS   	Ext: No LE edema B/L  	Neuro: Awake  	Skin: Warm and dry      LABS/STUDIES  --------------------------------------------------------------------------------              9.7    11.33 >-----------<  300      [04-07-23 @ 05:55]              28.4     140  |  105  |  58  ----------------------------<  148      [04-07-23 @ 05:55]  4.5   |  21  |  2.70        Ca     9.4     [04-07-23 @ 05:55]      Mg     1.8     [04-07-23 @ 05:55]      Phos  3.7     [04-07-23 @ 05:55]      PT/INR: PT 14.9 , INR 1.29       [04-07-23 @ 05:55]  PTT: 29.5       [04-07-23 @ 05:55]          [04-07-23 @ 05:55]    Creatinine Trend:  SCr 2.70 [04-07 @ 05:55]  SCr 2.48 [04-06 @ 06:44]  SCr 2.43 [04-05 @ 07:26]  SCr 2.64 [04-04 @ 19:14]    Urinalysis - [04-04-23 @ 23:03]      Color Light Yellow / Appearance Clear / SG 1.013 / pH 6.0      Gluc Negative / Ketone Negative  / Bili Negative / Urobili Negative       Blood Negative / Protein Trace / Leuk Est Negative / Nitrite Negative      RBC 1 / WBC 0 / Hyaline  / Gran  / Sq Epi  / Non Sq Epi 0 / Bacteria Negative    Urine Creatinine 91      [04-04-23 @ 23:04]  Urine Protein 16      [04-04-23 @ 23:04]      HCV 0.16, Nonreact      [04-05-23 @ 07:26]

## 2023-04-08 LAB
ANION GAP SERPL CALC-SCNC: 13 MMOL/L — SIGNIFICANT CHANGE UP (ref 5–17)
BUN SERPL-MCNC: 46 MG/DL — HIGH (ref 7–23)
CALCIUM SERPL-MCNC: 9.4 MG/DL — SIGNIFICANT CHANGE UP (ref 8.4–10.5)
CHLORIDE SERPL-SCNC: 105 MMOL/L — SIGNIFICANT CHANGE UP (ref 96–108)
CO2 SERPL-SCNC: 22 MMOL/L — SIGNIFICANT CHANGE UP (ref 22–31)
CREAT SERPL-MCNC: 2.39 MG/DL — HIGH (ref 0.5–1.3)
EGFR: 28 ML/MIN/1.73M2 — LOW
GLUCOSE BLDC GLUCOMTR-MCNC: 181 MG/DL — HIGH (ref 70–99)
GLUCOSE BLDC GLUCOMTR-MCNC: 228 MG/DL — HIGH (ref 70–99)
GLUCOSE SERPL-MCNC: 201 MG/DL — HIGH (ref 70–99)
HCT VFR BLD CALC: 30.7 % — LOW (ref 39–50)
HGB BLD-MCNC: 10.1 G/DL — LOW (ref 13–17)
MAGNESIUM SERPL-MCNC: 1.8 MG/DL — SIGNIFICANT CHANGE UP (ref 1.6–2.6)
MCHC RBC-ENTMCNC: 26.1 PG — LOW (ref 27–34)
MCHC RBC-ENTMCNC: 32.9 GM/DL — SIGNIFICANT CHANGE UP (ref 32–36)
MCV RBC AUTO: 79.3 FL — LOW (ref 80–100)
NRBC # BLD: 0 /100 WBCS — SIGNIFICANT CHANGE UP (ref 0–0)
PHOSPHATE SERPL-MCNC: 3.4 MG/DL — SIGNIFICANT CHANGE UP (ref 2.5–4.5)
PLATELET # BLD AUTO: 310 K/UL — SIGNIFICANT CHANGE UP (ref 150–400)
POTASSIUM SERPL-MCNC: 4.8 MMOL/L — SIGNIFICANT CHANGE UP (ref 3.5–5.3)
POTASSIUM SERPL-SCNC: 4.8 MMOL/L — SIGNIFICANT CHANGE UP (ref 3.5–5.3)
RBC # BLD: 3.87 M/UL — LOW (ref 4.2–5.8)
RBC # FLD: 16.9 % — HIGH (ref 10.3–14.5)
SODIUM SERPL-SCNC: 140 MMOL/L — SIGNIFICANT CHANGE UP (ref 135–145)
WBC # BLD: 11.82 K/UL — HIGH (ref 3.8–10.5)
WBC # FLD AUTO: 11.82 K/UL — HIGH (ref 3.8–10.5)

## 2023-04-08 PROCEDURE — 99233 SBSQ HOSP IP/OBS HIGH 50: CPT

## 2023-04-08 RX ADMIN — CHLORHEXIDINE GLUCONATE 1 APPLICATION(S): 213 SOLUTION TOPICAL at 12:40

## 2023-04-08 RX ADMIN — Medication 30 MILLIGRAM(S): at 08:57

## 2023-04-08 RX ADMIN — CILOSTAZOL 100 MILLIGRAM(S): 100 TABLET ORAL at 17:48

## 2023-04-08 RX ADMIN — ISOSORBIDE MONONITRATE 30 MILLIGRAM(S): 60 TABLET, EXTENDED RELEASE ORAL at 12:43

## 2023-04-08 RX ADMIN — CLOPIDOGREL BISULFATE 75 MILLIGRAM(S): 75 TABLET, FILM COATED ORAL at 12:43

## 2023-04-08 RX ADMIN — Medication 1: at 08:58

## 2023-04-08 RX ADMIN — ATORVASTATIN CALCIUM 40 MILLIGRAM(S): 80 TABLET, FILM COATED ORAL at 21:43

## 2023-04-08 RX ADMIN — Medication 81 MILLIGRAM(S): at 12:43

## 2023-04-08 RX ADMIN — Medication 50 MILLIGRAM(S): at 08:57

## 2023-04-08 RX ADMIN — Medication 2: at 17:52

## 2023-04-08 NOTE — PROGRESS NOTE ADULT - PROBLEM SELECTOR PLAN 1
VA arterial duplex showing mod severe dz.   Vascular Cardiology f/u plan noted- known PAD with CLI [place] Classification 6 with bilateral  of the SFAs,   - s/p DCBA and stent to the distal RSFA 4/7/23   - in 2 days LLE angio would be done

## 2023-04-08 NOTE — PROGRESS NOTE ADULT - PROBLEM SELECTOR PLAN 3
Unclear baseline. Scr 2.39  will cont to monitor overall trend , outpt, lytes, weight.   renal eval appreciated . renal US- no hydro

## 2023-04-08 NOTE — PROGRESS NOTE ADULT - SUBJECTIVE AND OBJECTIVE BOX
Interventional Cardiology Post Cath Progress Note:                Subjective:   Patient feels well- no current complaints- Denies chest pain, shortness of breath. Denies pain, numbness, tingling or swelling around groin access site      MEDICATIONS  (STANDING):  aspirin enteric coated 81 milliGRAM(s) Oral daily  atorvastatin 40 milliGRAM(s) Oral at bedtime  chlorhexidine 2% Cloths 1 Application(s) Topical daily  cilostazol 100 milliGRAM(s) Oral two times a day  clopidogrel Tablet 75 milliGRAM(s) Oral daily  heparin   Injectable 5000 Unit(s) SubCutaneous every 8 hours  insulin lispro (ADMELOG) corrective regimen sliding scale   SubCutaneous three times a day before meals  insulin lispro (ADMELOG) corrective regimen sliding scale   SubCutaneous at bedtime  isosorbide   mononitrate ER Tablet (IMDUR) 30 milliGRAM(s) Oral daily  metoprolol succinate ER 50 milliGRAM(s) Oral daily  NIFEdipine XL 30 milliGRAM(s) Oral daily  pantoprazole    Tablet 40 milliGRAM(s) Oral before breakfast  sodium chloride 0.9%. 1000 milliLiter(s) (50 mL/Hr) IV Continuous <Continuous>    MEDICATIONS  (PRN):  acetaminophen     Tablet .. 650 milliGRAM(s) Oral every 6 hours PRN Temp greater or equal to 38C (100.4F), Mild Pain (1 - 3)  dextrose Oral Gel 15 Gram(s) Oral once PRN Blood Glucose LESS THAN 70 milliGRAM(s)/deciliter      Objective:  Vital Signs Last 24 Hrs  T(C): 36.7 (08 Apr 2023 04:39), Max: 36.7 (07 Apr 2023 14:05)  T(F): 98 (08 Apr 2023 04:39), Max: 98 (07 Apr 2023 14:05)  HR: 84 (08 Apr 2023 04:39) (68 - 88)  BP: 168/89 (08 Apr 2023 04:39) (146/74 - 177/79)  BP(mean): 109 (07 Apr 2023 18:30) (98 - 114)  RR: 18 (08 Apr 2023 04:39) (18 - 18)  SpO2: 97% (08 Apr 2023 04:39) (97% - 100%)    Parameters below as of 08 Apr 2023 04:39  Patient On (Oxygen Delivery Method): room air        04-07-23 @ 07:01  -  04-08-23 @ 07:00  --------------------------------------------------------  IN: 237 mL / OUT: 420 mL / NET: -183 mL    04-08-23 @ 07:01  -  04-08-23 @ 11:54  --------------------------------------------------------  IN: 120 mL / OUT: 0 mL / NET: 120 mL                              10.1   11.82 )-----------( 310      ( 08 Apr 2023 07:39 )             30.7     04-08    140  |  105  |  46<H>  ----------------------------<  201<H>  4.8   |  22  |  2.39<H>    Ca    9.4      08 Apr 2023 07:37  Phos  3.4     04-08  Mg     1.8     04-08      PT/INR - ( 07 Apr 2023 05:55 )   PT: 14.9 sec;   INR: 1.29 ratio         PTT - ( 07 Apr 2023 05:55 )  PTT:29.5 sec    Physical Exam:  No apparent distress, alert and oriented times three, appropriate affect  Left groin: Soft, non tender, no bleeding or hematoma, clean/dry/intact- RLE/LLE +2 palpable femoral pulse and     Assessment/Plan:  HPI:  NIGHT HOSPITALIST:   Patient UNKNOWN to me previously, assigned to me at this point by Flory Garsia MD of Cardiology to accept transfer from [facility] for this 71 y/o M--patient with a history of type 2 DM complicated with CKD4, PAD with past stenting RLE, essential HTN, apparently still active cigarette use until one week ago, UNVACCINATED against COVID-19, with initial admission to [facility] 3/30/23 in the setting of patient with mild cognitive impairment, with spouse reporting the patient had lost his RIGHT hallux toenail about a week prior to that admission and noted dark discoloration of the RIGHT hallux and 2nd toe with foul odor emanating from the RIGHT foot.   Patient also with LEFT hallux discoloration.   Patient denies foot or limb pain.   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   Patient initially on Azactam and IV vancomycin but transferred to doxycycline PO.   Pavo at [facility] to be a high operative risk with evaluation by cardiology and transferred to [place] per Dr. Munson for possible Vascular Cardiology intervention.   (04 Apr 2023 19:23)    s/p intervention to right distal SFA closed with mynx device     - left groin site is stable,, BL LE are warm to touch + fem pulse, + sensation. Patient denies pain at the site   - Continue DAPT (aspirin 81mg and clopidogrel 75mg)  - Continue statin  - Recommend a heart healthy diet which includes a variety of fruits and vegetables, whole grains, low fat dairy products, legumes and skinless poulty and fish; food prepared with little or no salt and minimize processed foods  - Avoid using NSAIDs  (Aleve, Motrin, ibuprofen, naproxen) while on DAPT, please utilize Tylenol for pain control (not to exceed 4gm in 24 hours)  - Patient aware to take DAPT  as prescribed and DO NOT STOP taking without consulting cardiologist first or STENT/s WILL CLOSE  - Reviewed and reinforced with patient:  site complications ( eg: bleeding, excruciating pain at the procedural site, large swelling ball size-  extremity numbness, tingling, temperature change), or CHEST PAIN; pt aware that if any of those occur he/she must call cardiologist IMMEDIATELY or 911 or go to nearest emergency room   - Patient verbalizes understanding of ALL OF THE ABOVE, and gives positive feedback   -please make sure DAPT is prescribed to pt's preferred pharmacy on dc   -f/u appt in 2 weeks post dc with outpt cardiologist  - Keep Mg >2 K>4   - cont to monitor on tele  - all other care as per primary     Please check [email or web address] password [email or web address] for cardiology service schedule and contact information via TEAMS.

## 2023-04-08 NOTE — PROGRESS NOTE ADULT - SUBJECTIVE AND OBJECTIVE BOX
Cardiovascular Disease Progress Note  Date of service: 04-08-23 @ 12:01  Covering for Dr. Liu    Overnight events: No acute events overnight.    Otherwise review of systems negative    Objective Findings:  T(C): 36.7 (04-08-23 @ 04:39), Max: 36.7 (04-07-23 @ 14:05)  HR: 84 (04-08-23 @ 04:39) (68 - 88)  BP: 168/89 (04-08-23 @ 04:39) (146/74 - 177/79)  RR: 18 (04-08-23 @ 04:39) (18 - 18)  SpO2: 97% (04-08-23 @ 04:39) (97% - 100%)  Wt(kg): --  Daily     Daily       Physical Exam:  Gen: NAD; Patient resting comfortably  HEENT: EOMI, Normocephalic/ atraumatic  CV: RRR, normal S1 + S2, no m/r/g  Lungs:  Normal respiratory effort; clear to auscultation bilaterally  Abd: soft, non-tender; bowel sounds present  Ext: No edema; warm and well perfused    Telemetry:    Laboratory Data:                        10.1   11.82 )-----------( 310      ( 08 Apr 2023 07:39 )             30.7     04-08    140  |  105  |  46<H>  ----------------------------<  201<H>  4.8   |  22  |  2.39<H>    Ca    9.4      08 Apr 2023 07:37  Phos  3.4     04-08  Mg     1.8     04-08      PT/INR - ( 07 Apr 2023 05:55 )   PT: 14.9 sec;   INR: 1.29 ratio         PTT - ( 07 Apr 2023 05:55 )  PTT:29.5 sec          Inpatient Medications:  MEDICATIONS  (STANDING):  aspirin enteric coated 81 milliGRAM(s) Oral daily  atorvastatin 40 milliGRAM(s) Oral at bedtime  chlorhexidine 2% Cloths 1 Application(s) Topical daily  cilostazol 100 milliGRAM(s) Oral two times a day  clopidogrel Tablet 75 milliGRAM(s) Oral daily  dextrose 5%. 1000 milliLiter(s) (100 mL/Hr) IV Continuous <Continuous>  dextrose 5%. 1000 milliLiter(s) (50 mL/Hr) IV Continuous <Continuous>  dextrose 50% Injectable 25 Gram(s) IV Push once  dextrose 50% Injectable 12.5 Gram(s) IV Push once  dextrose 50% Injectable 25 Gram(s) IV Push once  glucagon  Injectable 1 milliGRAM(s) IntraMuscular once  heparin   Injectable 5000 Unit(s) SubCutaneous every 8 hours  insulin lispro (ADMELOG) corrective regimen sliding scale   SubCutaneous three times a day before meals  insulin lispro (ADMELOG) corrective regimen sliding scale   SubCutaneous at bedtime  isosorbide   mononitrate ER Tablet (IMDUR) 30 milliGRAM(s) Oral daily  metoprolol succinate ER 50 milliGRAM(s) Oral daily  NIFEdipine XL 30 milliGRAM(s) Oral daily  pantoprazole    Tablet 40 milliGRAM(s) Oral before breakfast  sodium chloride 0.9%. 1000 milliLiter(s) (50 mL/Hr) IV Continuous <Continuous>      Assessment:  71 y/o M--patient with a history of type 2 DM complicated with CKD4, PAD with unclear past stenting hx in the RLE, essential HTN tobacco use,  transferred from [facility] 3/30/23 with spouse reporting the patient had lost his R hallux toenail about a week prior to that admission and noted dark discoloration of the R hallux and 2nd toe with foul odor emanating from the R foot    Plan of Care:    #Pre-op risk stratification  - S/p peripheral angio 4/7/2023 revealing  of SFA bilaterally s/p DCBA and stenting of R SFA. Tolerated procedure well.   - Plan for stent to L SFA Monday 4/10  - Pt displays no evidence of coronary ischemia  - TTE shows normal LV systolic function with no significant structural abnormalities.   - From a cardiac standpoint, Pt is optimized for vascular intervention  - Pt is intermediate to moderate risk for desired procedure  - Further recommendations to follow pending clinical course.     #PAD  - Continue Cilostazol, and DAPT therapy  - Statin therapy  - Vascular input appreciated    #CKD  - Renal input appreciated    #CAD  - S/p PCI   -Continue Plavix, statin and BB            Over 25 minutes spent on total encounter; more than 50% of the visit was spent counseling and/or coordinating care by the attending physician.      Luis E Payan DO [facility]  Cardiovascular Disease  [phone]

## 2023-04-08 NOTE — PROGRESS NOTE ADULT - PROBLEM SELECTOR PLAN 2
Pt with stay at OSH on iv abx seen by pod and ID there. No clinical signs and symptoms of overt systemic infection.   ID rec appreciated - monitor off abx, WBC 11  wound cultures sent by pod. will follow  f/u pod, renal, card plans

## 2023-04-08 NOTE — PROGRESS NOTE ADULT - SUBJECTIVE AND OBJECTIVE BOX
Mohsin Khan, MD  Attending Physician, Division Of Hospital Medicine  Pager: [phone], Office: [phone]  Off hour pager: [phone]    Patient is a 72y old  Male who presents with a chief complaint of Transferred from [facility] for possible vascular intervention.     SUBJECTIVE / OVERNIGHT EVENTS:  Seen, examined the patient this am  Resting in bed, feels ok, no LE pain, SOB, fever, VSS      MEDICATIONS  (STANDING):  aspirin enteric coated 81 milliGRAM(s) Oral daily  atorvastatin 40 milliGRAM(s) Oral at bedtime  chlorhexidine 2% Cloths 1 Application(s) Topical daily  cilostazol 100 milliGRAM(s) Oral two times a day  clopidogrel Tablet 75 milliGRAM(s) Oral daily  dextrose 5%. 1000 milliLiter(s) (100 mL/Hr) IV Continuous <Continuous>  dextrose 5%. 1000 milliLiter(s) (50 mL/Hr) IV Continuous <Continuous>  dextrose 50% Injectable 25 Gram(s) IV Push once  dextrose 50% Injectable 12.5 Gram(s) IV Push once  dextrose 50% Injectable 25 Gram(s) IV Push once  glucagon  Injectable 1 milliGRAM(s) IntraMuscular once  heparin   Injectable 5000 Unit(s) SubCutaneous every 8 hours  insulin lispro (ADMELOG) corrective regimen sliding scale   SubCutaneous three times a day before meals  insulin lispro (ADMELOG) corrective regimen sliding scale   SubCutaneous at bedtime  isosorbide   mononitrate ER Tablet (IMDUR) 30 milliGRAM(s) Oral daily  metoprolol succinate ER 50 milliGRAM(s) Oral daily  NIFEdipine XL 30 milliGRAM(s) Oral daily  pantoprazole    Tablet 40 milliGRAM(s) Oral before breakfast  sodium chloride 0.9%. 1000 milliLiter(s) (50 mL/Hr) IV Continuous <Continuous>    MEDICATIONS  (PRN):  acetaminophen     Tablet .. 650 milliGRAM(s) Oral every 6 hours PRN Temp greater or equal to 38C (100.4F), Mild Pain (1 - 3)  dextrose Oral Gel 15 Gram(s) Oral once PRN Blood Glucose LESS THAN 70 milliGRAM(s)/deciliter      Vital Signs Last 24 Hrs  T(C): 36.7 (08 Apr 2023 04:39), Max: 37.1 (07 Apr 2023 10:45)  T(F): 98 (08 Apr 2023 04:39), Max: 98.7 (07 Apr 2023 10:45)  HR: 84 (08 Apr 2023 04:39) (68 - 88)  BP: 168/89 (08 Apr 2023 04:39) (134/79 - 177/79)  BP(mean): 109 (07 Apr 2023 18:30) (98 - 114)  RR: 18 (08 Apr 2023 04:39) (16 - 18)  SpO2: 97% (08 Apr 2023 04:39) (97% - 100%)    Parameters below as of 08 Apr 2023 04:39  Patient On (Oxygen Delivery Method): room air      CAPILLARY BLOOD GLUCOSE      POCT Blood Glucose.: 181 mg/dL (08 Apr 2023 08:53)  POCT Blood Glucose.: 208 mg/dL (07 Apr 2023 21:40)  POCT Blood Glucose.: 220 mg/dL (07 Apr 2023 16:34)  POCT Blood Glucose.: 149 mg/dL (07 Apr 2023 15:30)    I&O's Summary    07 Apr 2023 07:01  -  08 Apr 2023 07:00  --------------------------------------------------------  IN: 237 mL / OUT: 420 mL / NET: -183 mL        PHYSICAL EXAM:-  Appearance: NAD 	  HEENT:  NC/AT  Cardiovascular: RRR, S1 and S2  Respiratory: CTA B/L  Psychiatry:  AAO x 3  Gastrointestinal:  Soft, Non-tender, + BS	  Skin: No cyanosis	  Neurologic: No focal deficits noted  Extremities: no LE edema  Vascular Pulse Exam: Audible bilateral DP and PT signals  Foot Exam:  Left foot distal lateral hallux wound, 2nd digit dry gangrene  Right foot 2nd and 3rd digit dry gangrene, dorsal medial midfoot dry gangrene    LABS:                        10.1   11.82 )-----------( 310      ( 08 Apr 2023 07:39 )             30.7     04-08    140  |  105  |  46<H>  ----------------------------<  201<H>  4.8   |  22  |  2.39<H>    Ca    9.4      08 Apr 2023 07:37  Phos  3.4     04-08  Mg     1.8     04-08      PT/INR - ( 07 Apr 2023 05:55 )   PT: 14.9 sec;   INR: 1.29 ratio         PTT - ( 07 Apr 2023 05:55 )  PTT:29.5 sec    RADIOLOGY & ADDITIONAL TESTS:    Imaging Personally Reviewed: CXR, Echo, LE angio  Consultant(s) Notes Reviewed: Vascular Card

## 2023-04-09 LAB
ANION GAP SERPL CALC-SCNC: 11 MMOL/L — SIGNIFICANT CHANGE UP (ref 5–17)
BUN SERPL-MCNC: 46 MG/DL — HIGH (ref 7–23)
CALCIUM SERPL-MCNC: 9.5 MG/DL — SIGNIFICANT CHANGE UP (ref 8.4–10.5)
CHLORIDE SERPL-SCNC: 107 MMOL/L — SIGNIFICANT CHANGE UP (ref 96–108)
CO2 SERPL-SCNC: 23 MMOL/L — SIGNIFICANT CHANGE UP (ref 22–31)
CREAT SERPL-MCNC: 2.18 MG/DL — HIGH (ref 0.5–1.3)
EGFR: 32 ML/MIN/1.73M2 — LOW
GLUCOSE BLDC GLUCOMTR-MCNC: 183 MG/DL — HIGH (ref 70–99)
GLUCOSE BLDC GLUCOMTR-MCNC: 214 MG/DL — HIGH (ref 70–99)
GLUCOSE BLDC GLUCOMTR-MCNC: 235 MG/DL — HIGH (ref 70–99)
GLUCOSE BLDC GLUCOMTR-MCNC: 348 MG/DL — HIGH (ref 70–99)
GLUCOSE SERPL-MCNC: 183 MG/DL — HIGH (ref 70–99)
HCT VFR BLD CALC: 29.2 % — LOW (ref 39–50)
HGB BLD-MCNC: 9.8 G/DL — LOW (ref 13–17)
MCHC RBC-ENTMCNC: 26.6 PG — LOW (ref 27–34)
MCHC RBC-ENTMCNC: 33.6 GM/DL — SIGNIFICANT CHANGE UP (ref 32–36)
MCV RBC AUTO: 79.1 FL — LOW (ref 80–100)
NRBC # BLD: 0 /100 WBCS — SIGNIFICANT CHANGE UP (ref 0–0)
PLATELET # BLD AUTO: 282 K/UL — SIGNIFICANT CHANGE UP (ref 150–400)
POTASSIUM SERPL-MCNC: 4.2 MMOL/L — SIGNIFICANT CHANGE UP (ref 3.5–5.3)
POTASSIUM SERPL-SCNC: 4.2 MMOL/L — SIGNIFICANT CHANGE UP (ref 3.5–5.3)
RBC # BLD: 3.69 M/UL — LOW (ref 4.2–5.8)
RBC # FLD: 16.9 % — HIGH (ref 10.3–14.5)
SODIUM SERPL-SCNC: 141 MMOL/L — SIGNIFICANT CHANGE UP (ref 135–145)
WBC # BLD: 12.33 K/UL — HIGH (ref 3.8–10.5)
WBC # FLD AUTO: 12.33 K/UL — HIGH (ref 3.8–10.5)

## 2023-04-09 PROCEDURE — 99233 SBSQ HOSP IP/OBS HIGH 50: CPT

## 2023-04-09 RX ORDER — AZTREONAM 2 G
500 VIAL (EA) INJECTION ONCE
Refills: 0 | Status: COMPLETED | OUTPATIENT
Start: 2023-04-09 | End: 2023-04-09

## 2023-04-09 RX ORDER — INSULIN GLARGINE 100 [IU]/ML
4 INJECTION, SOLUTION SUBCUTANEOUS AT BEDTIME
Refills: 0 | Status: DISCONTINUED | OUTPATIENT
Start: 2023-04-09 | End: 2023-04-10

## 2023-04-09 RX ORDER — AZTREONAM 2 G
500 VIAL (EA) INJECTION EVERY 12 HOURS
Refills: 0 | Status: DISCONTINUED | OUTPATIENT
Start: 2023-04-10 | End: 2023-04-12

## 2023-04-09 RX ORDER — AZTREONAM 2 G
VIAL (EA) INJECTION
Refills: 0 | Status: DISCONTINUED | OUTPATIENT
Start: 2023-04-09 | End: 2023-04-12

## 2023-04-09 RX ORDER — OXYCODONE AND ACETAMINOPHEN 5; 325 MG/1; MG/1
1 TABLET ORAL EVERY 6 HOURS
Refills: 0 | Status: DISCONTINUED | OUTPATIENT
Start: 2023-04-09 | End: 2023-04-12

## 2023-04-09 RX ORDER — SENNA PLUS 8.6 MG/1
2 TABLET ORAL AT BEDTIME
Refills: 0 | Status: DISCONTINUED | OUTPATIENT
Start: 2023-04-09 | End: 2023-04-12

## 2023-04-09 RX ADMIN — Medication 1: at 08:40

## 2023-04-09 RX ADMIN — INSULIN GLARGINE 4 UNIT(S): 100 INJECTION, SOLUTION SUBCUTANEOUS at 21:30

## 2023-04-09 RX ADMIN — PANTOPRAZOLE SODIUM 40 MILLIGRAM(S): 20 TABLET, DELAYED RELEASE ORAL at 05:24

## 2023-04-09 RX ADMIN — CILOSTAZOL 100 MILLIGRAM(S): 100 TABLET ORAL at 17:22

## 2023-04-09 RX ADMIN — Medication 50 MILLIGRAM(S): at 13:12

## 2023-04-09 RX ADMIN — Medication 50 MILLIGRAM(S): at 05:24

## 2023-04-09 RX ADMIN — CHLORHEXIDINE GLUCONATE 1 APPLICATION(S): 213 SOLUTION TOPICAL at 12:19

## 2023-04-09 RX ADMIN — HEPARIN SODIUM 5000 UNIT(S): 5000 INJECTION INTRAVENOUS; SUBCUTANEOUS at 21:30

## 2023-04-09 RX ADMIN — CILOSTAZOL 100 MILLIGRAM(S): 100 TABLET ORAL at 05:24

## 2023-04-09 RX ADMIN — CLOPIDOGREL BISULFATE 75 MILLIGRAM(S): 75 TABLET, FILM COATED ORAL at 11:49

## 2023-04-09 RX ADMIN — Medication 4: at 12:46

## 2023-04-09 RX ADMIN — Medication 81 MILLIGRAM(S): at 11:49

## 2023-04-09 RX ADMIN — Medication 30 MILLIGRAM(S): at 05:25

## 2023-04-09 RX ADMIN — ISOSORBIDE MONONITRATE 30 MILLIGRAM(S): 60 TABLET, EXTENDED RELEASE ORAL at 11:50

## 2023-04-09 RX ADMIN — Medication 2: at 17:37

## 2023-04-09 RX ADMIN — SENNA PLUS 2 TABLET(S): 8.6 TABLET ORAL at 21:27

## 2023-04-09 RX ADMIN — ATORVASTATIN CALCIUM 40 MILLIGRAM(S): 80 TABLET, FILM COATED ORAL at 21:28

## 2023-04-09 NOTE — PROGRESS NOTE ADULT - PROBLEM SELECTOR PLAN 5
Cont with Plavix and Pletal.   c/w home meds. resume statin, BB   Nitrate. Cont with Plavix and Pletal.   c/w home meds. resume statin, BB, Nitrate.

## 2023-04-09 NOTE — PROGRESS NOTE ADULT - PROBLEM SELECTOR PLAN 2
Pt with stay at OSH on iv abx seen by pod and ID there. No clinical signs and symptoms of overt systemic infection.   ID rec appreciated - monitor off abx, WBC 11  wound cultures sent by pod. will follow  f/u pod, renal, card plans Pt with stay at OSH on iv abx seen by pod and ID there.   - Afebrile, WBC 12.3, wound c/s grew Citrobacter, staph & helcococcus  - started on IV aztreonam 500mg q 12, will f/u ID plans  - f/u pod, renal, card plans

## 2023-04-09 NOTE — PROGRESS NOTE ADULT - PROBLEM SELECTOR PLAN 3
Unclear baseline. Scr 2.39  will cont to monitor overall trend , outpt, lytes, weight.   renal eval appreciated . renal US- no hydro Unclear baseline. Scr 2.1  will cont to monitor overall trend , outpt, lytes, weight.   renal eval appreciated . renal US- no hydro  May need IVF pre and post procedure

## 2023-04-09 NOTE — PROGRESS NOTE ADULT - SUBJECTIVE AND OBJECTIVE BOX
Cardiovascular Disease Progress Note  Date of service: 04-09-23 @ 09:00  Covering for Dr. Liu  Overnight events: No acute events overnight.  Pt is in no distress.   Otherwise review of systems negative    Objective Findings:  T(C): 37.1 (04-09-23 @ 05:15), Max: 37.1 (04-08-23 @ 12:32)  HR: 82 (04-09-23 @ 05:15) (81 - 99)  BP: 153/88 (04-09-23 @ 05:15) (153/88 - 173/88)  RR: 18 (04-09-23 @ 05:15) (18 - 18)  SpO2: 98% (04-09-23 @ 05:15) (97% - 100%)  Wt(kg): --  Daily     Daily       Physical Exam:  Gen: NAD; Patient resting comfortably  HEENT: EOMI, Normocephalic/ atraumatic  CV: RRR, normal S1 + S2, no m/r/g  Lungs:  Normal respiratory effort; clear to auscultation bilaterally  Abd: soft, non-tender; bowel sounds present  Ext: No edema; warm and well perfused    Telemetry: Sinus     Laboratory Data:                        9.8    12.33 )-----------( 282      ( 09 Apr 2023 06:40 )             29.2     04-09    141  |  107  |  46<H>  ----------------------------<  183<H>  4.2   |  23  |  2.18<H>    Ca    9.5      09 Apr 2023 06:41  Phos  3.4     04-08  Mg     1.8     04-08                Inpatient Medications:  MEDICATIONS  (STANDING):  aspirin enteric coated 81 milliGRAM(s) Oral daily  atorvastatin 40 milliGRAM(s) Oral at bedtime  chlorhexidine 2% Cloths 1 Application(s) Topical daily  cilostazol 100 milliGRAM(s) Oral two times a day  clopidogrel Tablet 75 milliGRAM(s) Oral daily  dextrose 5%. 1000 milliLiter(s) (50 mL/Hr) IV Continuous <Continuous>  dextrose 5%. 1000 milliLiter(s) (100 mL/Hr) IV Continuous <Continuous>  dextrose 50% Injectable 25 Gram(s) IV Push once  dextrose 50% Injectable 12.5 Gram(s) IV Push once  dextrose 50% Injectable 25 Gram(s) IV Push once  glucagon  Injectable 1 milliGRAM(s) IntraMuscular once  heparin   Injectable 5000 Unit(s) SubCutaneous every 8 hours  insulin lispro (ADMELOG) corrective regimen sliding scale   SubCutaneous three times a day before meals  insulin lispro (ADMELOG) corrective regimen sliding scale   SubCutaneous at bedtime  isosorbide   mononitrate ER Tablet (IMDUR) 30 milliGRAM(s) Oral daily  metoprolol succinate ER 50 milliGRAM(s) Oral daily  NIFEdipine XL 30 milliGRAM(s) Oral daily  pantoprazole    Tablet 40 milliGRAM(s) Oral before breakfast  sodium chloride 0.9%. 1000 milliLiter(s) (50 mL/Hr) IV Continuous <Continuous>      Assessment:  71 y/o M--patient with a history of type 2 DM complicated with CKD4, PAD with unclear past stenting hx in the RLE, essential HTN tobacco use,  transferred from [facility] 3/30/23 with spouse reporting the patient had lost his R hallux toenail about a week prior to that admission and noted dark discoloration of the R hallux and 2nd toe with foul odor emanating from the R foot    Plan of Care:    #Pre-op risk stratification  - S/p peripheral angio 4/7/2023 revealing  of SFA bilaterally s/p DCBA and stenting of R SFA. Tolerated procedure well.   - Plan for stent to L SFA Monday 4/10 with vascular team.   - Pt displays no evidence of coronary ischemia  - TTE shows normal LV systolic function with no significant structural abnormalities.   - From a cardiac standpoint, Pt is optimized for vascular intervention  - Pt is intermediate to moderate risk for desired procedure  - Further recommendations to follow pending clinical course.     #PAD  - Continue Cilostazol, and DAPT therapy  - Statin therapy  - Vascular input appreciated    #CKD  - Renal input appreciated    #CAD  - S/p PCI   -Continue Plavix, statin and BB      #ACP (advance care planning)-  Advanced care planning was discussed with the patient.  Risks, benefits and alternatives of medical treatment and procedures were discussed in detail and all questions were answered. 30 additional minutes spent addressing advance care plans.          Over 25 minutes spent on total encounter; more than 50% of the visit was spent counseling and/or coordinating care by the attending physician.      Luis E Payan DO [facility]  Cardiovascular Disease  [phone]

## 2023-04-09 NOTE — PROGRESS NOTE ADULT - PROBLEM SELECTOR PLAN 1
VA arterial duplex showing mod severe dz.   Vascular Cardiology f/u plan noted- known PAD with CLI [place] Classification 6 with bilateral  of the SFAs,   - s/p DCBA and stent to the distal RSFA 4/7/23   - in 2 days LLE angio would be done VA arterial duplex showing mod severe dz. S/p peripheral angio 4/7/2023 revealing  of SFA bilaterally s/p DCBA and stenting of R SFA.  Vascular Cardiology f/u plan noted- known PAD with CLI [place] Classification 6 with bilateral  of the SFAs,   - s/p DCBA and stent to the distal RSFA 4/7/23   - Plan for stent to L SFA Monday 4/10 with vascular team. NPO p MN  - analgesics  - cleared by cardiology for the procedure

## 2023-04-09 NOTE — PROGRESS NOTE ADULT - SUBJECTIVE AND OBJECTIVE BOX
Mohsin Khan, MD  Attending Physician, Division Of Hospital Medicine  Pager: [phone], Office: [phone]  Off hour pager: [phone]    Patient is a 72y old  Male who presents with a chief complaint of Transferred from [facility] for possible vascular intervention. (09 Apr 2023 09:00)        SUBJECTIVE / OVERNIGHT EVENTS:      MEDICATIONS  (STANDING):  aspirin enteric coated 81 milliGRAM(s) Oral daily  atorvastatin 40 milliGRAM(s) Oral at bedtime  chlorhexidine 2% Cloths 1 Application(s) Topical daily  cilostazol 100 milliGRAM(s) Oral two times a day  clopidogrel Tablet 75 milliGRAM(s) Oral daily  dextrose 5%. 1000 milliLiter(s) (50 mL/Hr) IV Continuous <Continuous>  dextrose 5%. 1000 milliLiter(s) (100 mL/Hr) IV Continuous <Continuous>  dextrose 50% Injectable 25 Gram(s) IV Push once  dextrose 50% Injectable 12.5 Gram(s) IV Push once  dextrose 50% Injectable 25 Gram(s) IV Push once  glucagon  Injectable 1 milliGRAM(s) IntraMuscular once  heparin   Injectable 5000 Unit(s) SubCutaneous every 8 hours  insulin lispro (ADMELOG) corrective regimen sliding scale   SubCutaneous three times a day before meals  insulin lispro (ADMELOG) corrective regimen sliding scale   SubCutaneous at bedtime  isosorbide   mononitrate ER Tablet (IMDUR) 30 milliGRAM(s) Oral daily  metoprolol succinate ER 50 milliGRAM(s) Oral daily  NIFEdipine XL 30 milliGRAM(s) Oral daily  pantoprazole    Tablet 40 milliGRAM(s) Oral before breakfast  sodium chloride 0.9%. 1000 milliLiter(s) (50 mL/Hr) IV Continuous <Continuous>    MEDICATIONS  (PRN):  acetaminophen     Tablet .. 650 milliGRAM(s) Oral every 6 hours PRN Temp greater or equal to 38C (100.4F), Mild Pain (1 - 3)  dextrose Oral Gel 15 Gram(s) Oral once PRN Blood Glucose LESS THAN 70 milliGRAM(s)/deciliter      Vital Signs Last 24 Hrs  T(C): 37.1 (09 Apr 2023 05:15), Max: 37.1 (08 Apr 2023 12:32)  T(F): 98.7 (09 Apr 2023 05:15), Max: 98.8 (08 Apr 2023 12:32)  HR: 82 (09 Apr 2023 05:15) (81 - 99)  BP: 153/88 (09 Apr 2023 05:15) (153/88 - 173/88)  BP(mean): --  RR: 18 (09 Apr 2023 05:15) (18 - 18)  SpO2: 98% (09 Apr 2023 05:15) (97% - 100%)    Parameters below as of 09 Apr 2023 05:15  Patient On (Oxygen Delivery Method): room air      CAPILLARY BLOOD GLUCOSE      POCT Blood Glucose.: 183 mg/dL (09 Apr 2023 07:51)  POCT Blood Glucose.: 228 mg/dL (08 Apr 2023 17:51)    I&O's Summary    08 Apr 2023 07:01  -  09 Apr 2023 07:00  --------------------------------------------------------  IN: 360 mL / OUT: 1200 mL / NET: -840 mL        PHYSICAL EXAM:-  GENERAL: NAD, well-developed  EYES: EOMI, PERRLA, conjunctiva and sclera clear  NECK: Supple, No JVD, no thyromegaly  CHEST/LUNG: Clear to auscultation bilaterally; No wheeze  HEART: Regular rate and rhythm; S1, S2 audible, No murmurs, rubs, or gallops  ABDOMEN: Soft, Nontender, Nondistended; Bowel sounds present  EXTREMITIES:  2+ Peripheral Pulses, No clubbing, cyanosis, or edema  NEURO: AAOx3, no focal deficit      LABS:                        9.8    12.33 )-----------( 282      ( 09 Apr 2023 06:40 )             29.2     04-09    141  |  107  |  46<H>  ----------------------------<  183<H>  4.2   |  23  |  2.18<H>    Ca    9.5      09 Apr 2023 06:41  Phos  3.4     04-08  Mg     1.8     04-08                RADIOLOGY & ADDITIONAL TESTS:    Imaging Personally Reviewed:  Consultant(s) Notes Reviewed:    Care Discussed with Consultants/Other Providers:   Mohsin Khan, MD  Attending Physician, Division Of Hospital Medicine  Pager: [phone], Office: [phone]  Off hour pager: [phone]    Patient is a 72y old  Male who presents with a chief complaint of Transferred from [facility] for possible vascular intervention.     SUBJECTIVE / OVERNIGHT EVENTS:  Seen, examined the patient this am  Resting in bed, c/o pain in LE on and off (5/10), afebrile, no fall, s/p LE angio 2 days ago, VSS    MEDICATIONS  (STANDING):  aspirin enteric coated 81 milliGRAM(s) Oral daily  atorvastatin 40 milliGRAM(s) Oral at bedtime  chlorhexidine 2% Cloths 1 Application(s) Topical daily  cilostazol 100 milliGRAM(s) Oral two times a day  clopidogrel Tablet 75 milliGRAM(s) Oral daily  dextrose 5%. 1000 milliLiter(s) (50 mL/Hr) IV Continuous <Continuous>  dextrose 5%. 1000 milliLiter(s) (100 mL/Hr) IV Continuous <Continuous>  dextrose 50% Injectable 25 Gram(s) IV Push once  dextrose 50% Injectable 12.5 Gram(s) IV Push once  dextrose 50% Injectable 25 Gram(s) IV Push once  glucagon  Injectable 1 milliGRAM(s) IntraMuscular once  heparin   Injectable 5000 Unit(s) SubCutaneous every 8 hours  insulin lispro (ADMELOG) corrective regimen sliding scale   SubCutaneous three times a day before meals  insulin lispro (ADMELOG) corrective regimen sliding scale   SubCutaneous at bedtime  isosorbide   mononitrate ER Tablet (IMDUR) 30 milliGRAM(s) Oral daily  metoprolol succinate ER 50 milliGRAM(s) Oral daily  NIFEdipine XL 30 milliGRAM(s) Oral daily  pantoprazole    Tablet 40 milliGRAM(s) Oral before breakfast  sodium chloride 0.9%. 1000 milliLiter(s) (50 mL/Hr) IV Continuous <Continuous>    MEDICATIONS  (PRN):  acetaminophen     Tablet .. 650 milliGRAM(s) Oral every 6 hours PRN Temp greater or equal to 38C (100.4F), Mild Pain (1 - 3)  dextrose Oral Gel 15 Gram(s) Oral once PRN Blood Glucose LESS THAN 70 milliGRAM(s)/deciliter      Vital Signs Last 24 Hrs  T(C): 37.1 (09 Apr 2023 05:15), Max: 37.1 (08 Apr 2023 12:32)  T(F): 98.7 (09 Apr 2023 05:15), Max: 98.8 (08 Apr 2023 12:32)  HR: 82 (09 Apr 2023 05:15) (81 - 99)  BP: 153/88 (09 Apr 2023 05:15) (153/88 - 173/88)  BP(mean): --  RR: 18 (09 Apr 2023 05:15) (18 - 18)  SpO2: 98% (09 Apr 2023 05:15) (97% - 100%)    Parameters below as of 09 Apr 2023 05:15  Patient On (Oxygen Delivery Method): room air      CAPILLARY BLOOD GLUCOSE      POCT Blood Glucose.: 183 mg/dL (09 Apr 2023 07:51)  POCT Blood Glucose.: 228 mg/dL (08 Apr 2023 17:51)    I&O's Summary    08 Apr 2023 07:01  -  09 Apr 2023 07:00  --------------------------------------------------------  IN: 360 mL / OUT: 1200 mL / NET: -840 mL        PHYSICAL EXAM:-  Appearance: NAD 	  HEENT:  NC/AT  Cardiovascular: RRR, S1 and S2  Respiratory: CTA B/L  Psychiatry:  AAO x 3  Gastrointestinal:  Soft, Non-tender, + BS	  Skin: No cyanosis	  Neurologic: No focal deficits noted  Extremities: no LE edema  Vascular Pulse Exam: Audible bilateral DP and PT signals  Foot Exam:  Left foot distal lateral hallux wound, 2nd digit dry gangrene  Right foot 2nd and 3rd digit dry gangrene, dorsal medial midfoot dry gangrene    LABS:                        9.8    12.33 )-----------( 282      ( 09 Apr 2023 06:40 )             29.2     04-09    141  |  107  |  46<H>  ----------------------------<  183<H>  4.2   |  23  |  2.18<H>    Ca    9.5      09 Apr 2023 06:41  Phos  3.4     04-08  Mg     1.8     04-08      RADIOLOGY & ADDITIONAL TESTS:    Imaging Personally Reviewed: CXR, echo  Consultant(s) Notes Reviewed:  Card

## 2023-04-10 DIAGNOSIS — I10 ESSENTIAL (PRIMARY) HYPERTENSION: ICD-10-CM

## 2023-04-10 LAB
ALBUMIN SERPL ELPH-MCNC: 3.4 G/DL — SIGNIFICANT CHANGE UP (ref 3.3–5)
ALP SERPL-CCNC: 94 U/L — SIGNIFICANT CHANGE UP (ref 40–120)
ALT FLD-CCNC: 7 U/L — LOW (ref 10–45)
ANION GAP SERPL CALC-SCNC: 11 MMOL/L — SIGNIFICANT CHANGE UP (ref 5–17)
AST SERPL-CCNC: 5 U/L — LOW (ref 10–40)
BILIRUB SERPL-MCNC: 0.3 MG/DL — SIGNIFICANT CHANGE UP (ref 0.2–1.2)
BUN SERPL-MCNC: 54 MG/DL — HIGH (ref 7–23)
CALCIUM SERPL-MCNC: 9.2 MG/DL — SIGNIFICANT CHANGE UP (ref 8.4–10.5)
CHLORIDE SERPL-SCNC: 107 MMOL/L — SIGNIFICANT CHANGE UP (ref 96–108)
CO2 SERPL-SCNC: 22 MMOL/L — SIGNIFICANT CHANGE UP (ref 22–31)
CREAT SERPL-MCNC: 2.51 MG/DL — HIGH (ref 0.5–1.3)
CULTURE RESULTS: SIGNIFICANT CHANGE UP
EGFR: 27 ML/MIN/1.73M2 — LOW
GLUCOSE BLDC GLUCOMTR-MCNC: 210 MG/DL — HIGH (ref 70–99)
GLUCOSE BLDC GLUCOMTR-MCNC: 237 MG/DL — HIGH (ref 70–99)
GLUCOSE BLDC GLUCOMTR-MCNC: 256 MG/DL — HIGH (ref 70–99)
GLUCOSE BLDC GLUCOMTR-MCNC: 268 MG/DL — HIGH (ref 70–99)
GLUCOSE SERPL-MCNC: 248 MG/DL — HIGH (ref 70–99)
HCT VFR BLD CALC: 27.3 % — LOW (ref 39–50)
HGB BLD-MCNC: 9.4 G/DL — LOW (ref 13–17)
MCHC RBC-ENTMCNC: 26.9 PG — LOW (ref 27–34)
MCHC RBC-ENTMCNC: 34.4 GM/DL — SIGNIFICANT CHANGE UP (ref 32–36)
MCV RBC AUTO: 78.2 FL — LOW (ref 80–100)
NRBC # BLD: 0 /100 WBCS — SIGNIFICANT CHANGE UP (ref 0–0)
ORGANISM # SPEC MICROSCOPIC CNT: SIGNIFICANT CHANGE UP
ORGANISM # SPEC MICROSCOPIC CNT: SIGNIFICANT CHANGE UP
PLATELET # BLD AUTO: 247 K/UL — SIGNIFICANT CHANGE UP (ref 150–400)
POTASSIUM SERPL-MCNC: 4.4 MMOL/L — SIGNIFICANT CHANGE UP (ref 3.5–5.3)
POTASSIUM SERPL-SCNC: 4.4 MMOL/L — SIGNIFICANT CHANGE UP (ref 3.5–5.3)
PROT SERPL-MCNC: 7.5 G/DL — SIGNIFICANT CHANGE UP (ref 6–8.3)
RBC # BLD: 3.49 M/UL — LOW (ref 4.2–5.8)
RBC # FLD: 17.2 % — HIGH (ref 10.3–14.5)
SODIUM SERPL-SCNC: 140 MMOL/L — SIGNIFICANT CHANGE UP (ref 135–145)
SPECIMEN SOURCE: SIGNIFICANT CHANGE UP
WBC # BLD: 13.09 K/UL — HIGH (ref 3.8–10.5)
WBC # FLD AUTO: 13.09 K/UL — HIGH (ref 3.8–10.5)

## 2023-04-10 PROCEDURE — 37226: CPT

## 2023-04-10 PROCEDURE — 99233 SBSQ HOSP IP/OBS HIGH 50: CPT

## 2023-04-10 PROCEDURE — 99152 MOD SED SAME PHYS/QHP 5/>YRS: CPT

## 2023-04-10 PROCEDURE — 99233 SBSQ HOSP IP/OBS HIGH 50: CPT | Mod: GC

## 2023-04-10 PROCEDURE — 99232 SBSQ HOSP IP/OBS MODERATE 35: CPT

## 2023-04-10 RX ORDER — INSULIN GLARGINE 100 [IU]/ML
8 INJECTION, SOLUTION SUBCUTANEOUS AT BEDTIME
Refills: 0 | Status: DISCONTINUED | OUTPATIENT
Start: 2023-04-10 | End: 2023-04-11

## 2023-04-10 RX ORDER — SODIUM CHLORIDE 9 MG/ML
1000 INJECTION INTRAMUSCULAR; INTRAVENOUS; SUBCUTANEOUS
Refills: 0 | Status: DISCONTINUED | OUTPATIENT
Start: 2023-04-10 | End: 2023-04-11

## 2023-04-10 RX ORDER — INSULIN LISPRO 100/ML
3 VIAL (ML) SUBCUTANEOUS
Refills: 0 | Status: DISCONTINUED | OUTPATIENT
Start: 2023-04-10 | End: 2023-04-11

## 2023-04-10 RX ADMIN — Medication 30 MILLIGRAM(S): at 05:22

## 2023-04-10 RX ADMIN — INSULIN GLARGINE 8 UNIT(S): 100 INJECTION, SOLUTION SUBCUTANEOUS at 21:39

## 2023-04-10 RX ADMIN — PANTOPRAZOLE SODIUM 40 MILLIGRAM(S): 20 TABLET, DELAYED RELEASE ORAL at 05:30

## 2023-04-10 RX ADMIN — Medication 50 MILLIGRAM(S): at 05:22

## 2023-04-10 RX ADMIN — Medication 3: at 18:30

## 2023-04-10 RX ADMIN — ISOSORBIDE MONONITRATE 30 MILLIGRAM(S): 60 TABLET, EXTENDED RELEASE ORAL at 13:18

## 2023-04-10 RX ADMIN — CILOSTAZOL 100 MILLIGRAM(S): 100 TABLET ORAL at 05:23

## 2023-04-10 RX ADMIN — CLOPIDOGREL BISULFATE 75 MILLIGRAM(S): 75 TABLET, FILM COATED ORAL at 10:21

## 2023-04-10 RX ADMIN — Medication 81 MILLIGRAM(S): at 10:21

## 2023-04-10 RX ADMIN — SENNA PLUS 2 TABLET(S): 8.6 TABLET ORAL at 21:39

## 2023-04-10 RX ADMIN — SODIUM CHLORIDE 75 MILLILITER(S): 9 INJECTION INTRAMUSCULAR; INTRAVENOUS; SUBCUTANEOUS at 10:02

## 2023-04-10 RX ADMIN — Medication 50 MILLIGRAM(S): at 05:23

## 2023-04-10 RX ADMIN — ATORVASTATIN CALCIUM 40 MILLIGRAM(S): 80 TABLET, FILM COATED ORAL at 21:39

## 2023-04-10 RX ADMIN — Medication 2: at 13:18

## 2023-04-10 RX ADMIN — HEPARIN SODIUM 5000 UNIT(S): 5000 INJECTION INTRAVENOUS; SUBCUTANEOUS at 05:28

## 2023-04-10 RX ADMIN — Medication 2: at 08:13

## 2023-04-10 RX ADMIN — Medication 50 MILLIGRAM(S): at 19:07

## 2023-04-10 RX ADMIN — CILOSTAZOL 100 MILLIGRAM(S): 100 TABLET ORAL at 18:32

## 2023-04-10 RX ADMIN — SODIUM CHLORIDE 75 MILLILITER(S): 9 INJECTION INTRAMUSCULAR; INTRAVENOUS; SUBCUTANEOUS at 15:07

## 2023-04-10 NOTE — PROGRESS NOTE ADULT - PROBLEM SELECTOR PLAN 2
Pt. with anemia. Iron studies reviewed. Monitor H/H. Transfuse as per primary team.     If any questions, please feel free to contact me     Jerry Gamble  Nephrology Fellow  [facility] Pager: [phone].

## 2023-04-10 NOTE — PROGRESS NOTE ADULT - ASSESSMENT
Assessment:  1. PAD with CLI [place] Classification 6 with bilateral  of the SFAs, s/p DCBA and stent to the distal RSFA 4/7/23   2. CAD s/p prior PCI  3. HTN  4. HLD  5. DM  6. CKD stage 4  7. Current Smoker      Plan:  1. Return to cath lab today for planned intervention of the LSFA.    2. Appreciate Renal, Podiatry and Vascular surgery.   3. Statin therapy   4. Continue Plavix and ASA 81 mg. Cont Cilostazol.      Please call with any questions:   DIRECT SERVICE NUMBER: [phone]

## 2023-04-10 NOTE — PROGRESS NOTE ADULT - PROBLEM SELECTOR PLAN 4
HbA1c 5.5%  - c/w sliding scale  - add lantus 8 units qHS  - add pre-meal admelog 3 units TID qAC, hold if NPO  - monitor FS qAC + qHS

## 2023-04-10 NOTE — PROGRESS NOTE ADULT - SUBJECTIVE AND OBJECTIVE BOX
DATE OF SERVICE: 04-10-23 @ 15:17    Patient is a 72y old  Male who presents with a chief complaint of Transferred from [facility] for possible vascular intervention. (10 Apr 2023 12:36)      INTERVAL HISTORY: Feels well, reports LE pain     REVIEW OF SYSTEMS:  CONSTITUTIONAL: No weakness  EYES/ENT: No visual changes;  No throat pain   NECK: No pain or stiffness  RESPIRATORY: No cough, wheezing; No shortness of breath  CARDIOVASCULAR: No chest pain or palpitations  GASTROINTESTINAL: No abdominal  pain. No nausea, vomiting, or hematemesis  GENITOURINARY: No dysuria, frequency or hematuria  NEUROLOGICAL: No stroke like symptoms  SKIN: No rashes    TELEMETRY Personally reviewed: NS 80's   	  MEDICATIONS:  isosorbide   mononitrate ER Tablet (IMDUR) 30 milliGRAM(s) Oral daily  metoprolol succinate ER 50 milliGRAM(s) Oral daily  NIFEdipine XL 30 milliGRAM(s) Oral daily        PHYSICAL EXAM:  T(C): 36.8 (04-10-23 @ 15:12), Max: 37.2 (04-09-23 @ 20:32)  HR: 98 (04-10-23 @ 15:12) (87 - 105)  BP: 162/91 (04-10-23 @ 15:12) (142/65 - 178/81)  RR: 18 (04-10-23 @ 15:12) (18 - 18)  SpO2: 100% (04-10-23 @ 15:12) (96% - 100%)  Wt(kg): --  I&O's Summary    09 Apr 2023 07:01  -  10 Apr 2023 07:00  --------------------------------------------------------  IN: 480 mL / OUT: 1600 mL / NET: -1120 mL          Appearance: In no distress	  HEENT:    PERRL, EOMI	  Cardiovascular:  S1 S2, No JVD  Respiratory: Lungs clear to auscultation	  Gastrointestinal:  Soft, Non-tender, + BS	  Vascularature:  No edema of LE  Psychiatric: Appropriate affect   Neuro: no acute focal deficits                               9.4    13.09 )-----------( 247      ( 10 Apr 2023 06:22 )             27.3     04-10    140  |  107  |  54<H>  ----------------------------<  248<H>  4.4   |  22  |  2.51<H>    Ca    9.2      10 Apr 2023 06:23    TPro  7.5  /  Alb  3.4  /  TBili  0.3  /  DBili  x   /  AST  5<L>  /  ALT  7<L>  /  AlkPhos  94  04-10        Labs personally reviewed      ASSESSMENT/PLAN: 	  73 y/o M--patient with a history of type 2 DM complicated with CKD4, PAD with unclear past stenting hx in the RLE, essential HTN tobacco use,  transferred from [facility] 3/30/23 with spouse reporting the patient had lost his R hallux toenail about a week prior to that admission and noted dark discoloration of the R hallux and 2nd toe with foul odor emanating from the R foot    Plan of Care:    #Pre-op risk stratification  - S/p peripheral angio 4/7/2023 revealing  of SFA bilaterally s/p DCBA and stenting of R SFA. Tolerated procedure well.   - Plan for stent to L SFA Monday 4/10 with vascular team.   - Pt displays no evidence of coronary ischemia  - TTE shows normal LV systolic function with no significant structural abnormalities.   - From a cardiac standpoint, Pt is optimized for vascular intervention  - Pt is intermediate to moderate risk for desired procedure  - Further recommendations to follow pending clinical course.     #PAD  - Continue Cilostazol, and DAPT therapy  - Statin therapy  - Vascular input appreciated    #CKD  - Renal input appreciated    #CAD  - S/p PCI   -Continue Plavix, statin and BB        Lalitha Jefferson, SUHA-NP   Cedric Liu DO [facility]  Cardiovascular Medicine  [address], [address]  Available through call or text on [employer]  Office: [phone]

## 2023-04-10 NOTE — PROGRESS NOTE ADULT - PROBLEM SELECTOR PLAN 1
VA arterial duplex showing mod severe disease.   - s/p peripheral angio 4/7/2023 revealing  of SFA bilaterally s/p DCBA and stenting of R SFA  - Vascular Cardiology consult appreciated  - patient with known PAD with CLI [place] Classification 6 with bilateral  of the SFAs,   - s/p DCBA and stent to the distal R SFA 4/7/23   - now s/p stent to L SFA on 4/10  - c/w DAPT, cilostazol

## 2023-04-10 NOTE — PROGRESS NOTE ADULT - SUBJECTIVE AND OBJECTIVE BOX
[facility] DIVISION OF KIDNEY DISEASES AND HYPERTENSION -- FOLLOW UP NOTE  --------------------------------------------------------------------------------  72 year old male with DM, PAD, HTN and CKD who presented to a OSH with AMS and noted to have R hallux and 2nd toe dry gangrene and LEFT hallux osteomyelitis. The patient was initially treated with IV ABx however transferred to [facility] for further evaluation. The nephrology team was consulted for elevated Serum creatinine levels and possible need for vascular procedures during the hospital course. On admission pt noted to have a SCr of 2.18.     24 hour events/subjective:  Pt. seen and examined earlier today.  Denied any chest pain, shortness of breath, nausea, vomiting, or abdominal pain.   No acute issues noted overnight.          PAST HISTORY  --------------------------------------------------------------------------------  No significant changes to PMH, PSH, FHx, SHx, unless otherwise noted    ALLERGIES & MEDICATIONS  --------------------------------------------------------------------------------  Allergies    penicillin (Unknown)    Intolerances      Standing Inpatient Medications  aspirin enteric coated 81 milliGRAM(s) Oral daily  atorvastatin 40 milliGRAM(s) Oral at bedtime  aztreonam  IVPB 500 milliGRAM(s) IV Intermittent every 12 hours  aztreonam  IVPB      chlorhexidine 2% Cloths 1 Application(s) Topical daily  cilostazol 100 milliGRAM(s) Oral two times a day  clopidogrel Tablet 75 milliGRAM(s) Oral daily  dextrose 5%. 1000 milliLiter(s) IV Continuous <Continuous>  dextrose 5%. 1000 milliLiter(s) IV Continuous <Continuous>  dextrose 50% Injectable 25 Gram(s) IV Push once  dextrose 50% Injectable 12.5 Gram(s) IV Push once  dextrose 50% Injectable 25 Gram(s) IV Push once  glucagon  Injectable 1 milliGRAM(s) IntraMuscular once  heparin   Injectable 5000 Unit(s) SubCutaneous every 8 hours  insulin glargine Injectable (LANTUS) 4 Unit(s) SubCutaneous at bedtime  insulin lispro (ADMELOG) corrective regimen sliding scale   SubCutaneous three times a day before meals  insulin lispro (ADMELOG) corrective regimen sliding scale   SubCutaneous at bedtime  isosorbide   mononitrate ER Tablet (IMDUR) 30 milliGRAM(s) Oral daily  metoprolol succinate ER 50 milliGRAM(s) Oral daily  NIFEdipine XL 30 milliGRAM(s) Oral daily  pantoprazole    Tablet 40 milliGRAM(s) Oral before breakfast  senna 2 Tablet(s) Oral at bedtime  sodium chloride 0.9%. 1000 milliLiter(s) IV Continuous <Continuous>    PRN Inpatient Medications  acetaminophen     Tablet .. 650 milliGRAM(s) Oral every 6 hours PRN  dextrose Oral Gel 15 Gram(s) Oral once PRN  oxycodone    5 mG/acetaminophen 325 mG 1 Tablet(s) Oral every 6 hours PRN      REVIEW OF SYSTEMS    All other systems were reviewed and are negative, except as noted.    VITALS/PHYSICAL EXAM  --------------------------------------------------------------------------------  T(C): 36.9 (04-10-23 @ 04:43), Max: 37.5 (04-09-23 @ 11:40)  HR: 105 (04-10-23 @ 04:43) (89 - 106)  BP: 153/77 (04-10-23 @ 04:43) (152/79 - 184/84)  RR: 18 (04-10-23 @ 04:43) (18 - 18)  SpO2: 99% (04-10-23 @ 04:43) (99% - 99%)  Wt(kg): --    04-08-23 @ 07:01  -  04-09-23 @ 07:00  --------------------------------------------------------  IN: 360 mL / OUT: 1200 mL / NET: -840 mL    04-09-23 @ 07:01  -  04-10-23 @ 06:52  --------------------------------------------------------  IN: 480 mL / OUT: 1600 mL / NET: -1120 mL    Physical Exam:  	Gen: NAD  	HEENT: MMM  	Pulm: CTA B/L  	CV: S1S2  	Abd: Soft, +BS   	Ext: No LE edema B/L  	Neuro: Awake  	Skin: Warm and dry      LABS/STUDIES  --------------------------------------------------------------------------------              9.4    13.09 >-----------<  247      [04-10-23 @ 06:22]              27.3     141  |  107  |  46  ----------------------------<  183      [04-09-23 @ 06:41]  4.2   |  23  |  2.18        Ca     9.5     [04-09-23 @ 06:41]      Mg     1.8     [04-08-23 @ 07:37]      Phos  3.4     [04-08-23 @ 07:37]            Creatinine Trend:  SCr 2.18 [04-09 @ 06:41]  SCr 2.39 [04-08 @ 07:37]  SCr 2.70 [04-07 @ 05:55]  SCr 2.48 [04-06 @ 06:44]  SCr 2.43 [04-05 @ 07:26]    Urinalysis - [04-04-23 @ 23:03]      Color Light Yellow / Appearance Clear / SG 1.013 / pH 6.0      Gluc Negative / Ketone Negative  / Bili Negative / Urobili Negative       Blood Negative / Protein Trace / Leuk Est Negative / Nitrite Negative      RBC 1 / WBC 0 / Hyaline  / Gran  / Sq Epi  / Non Sq Epi 0 / Bacteria Negative    Urine Creatinine 91      [04-04-23 @ 23:04]  Urine Protein 16      [04-04-23 @ 23:04]    Iron 38, TIBC 165, %sat 23      [04-07-23 @ 05:55]  Ferritin 401      [04-07-23 @ 05:55]    HCV 0.16, Nonreact      [04-05-23 @ 07:26]

## 2023-04-10 NOTE — PROGRESS NOTE ADULT - ASSESSMENT
73 yo male with PMH of CKD4, PAD s/p stent, CAD, HTN, T2DM, current smoker who p/w LE dry gangrene and osteomyelitis now s/p R SFA stent on 4/7 and L SFA stent on 4/10 waiting OR with podiatry.

## 2023-04-10 NOTE — PROGRESS NOTE ADULT - SUBJECTIVE AND OBJECTIVE BOX
INTERVAL HISTORY: Pt endorses fatigue. Has not walked much since previous vascular intervention. No lower extremity complaints.        Allergies  penicillin (Unknown)     	    MEDICATIONS:  aspirin enteric coated 81 milliGRAM(s) Oral daily  cilostazol 100 milliGRAM(s) Oral two times a day  clopidogrel Tablet 75 milliGRAM(s) Oral daily  heparin   Injectable 5000 Unit(s) SubCutaneous every 8 hours  isosorbide   mononitrate ER Tablet (IMDUR) 30 milliGRAM(s) Oral daily  metoprolol succinate ER 50 milliGRAM(s) Oral daily  NIFEdipine XL 30 milliGRAM(s) Oral daily  aztreonam  IVPB 500 milliGRAM(s) IV Intermittent every 12 hours  aztreonam  IVPB      acetaminophen     Tablet .. 650 milliGRAM(s) Oral every 6 hours PRN  oxycodone    5 mG/acetaminophen 325 mG 1 Tablet(s) Oral every 6 hours PRN  pantoprazole    Tablet 40 milliGRAM(s) Oral before breakfast  senna 2 Tablet(s) Oral at bedtime  atorvastatin 40 milliGRAM(s) Oral at bedtime  dextrose 50% Injectable 25 Gram(s) IV Push once  dextrose 50% Injectable 12.5 Gram(s) IV Push once  dextrose 50% Injectable 25 Gram(s) IV Push once  dextrose Oral Gel 15 Gram(s) Oral once PRN  glucagon  Injectable 1 milliGRAM(s) IntraMuscular once  insulin glargine Injectable (LANTUS) 4 Unit(s) SubCutaneous at bedtime  insulin lispro (ADMELOG) corrective regimen sliding scale   SubCutaneous three times a day before meals  insulin lispro (ADMELOG) corrective regimen sliding scale   SubCutaneous at bedtime  chlorhexidine 2% Cloths 1 Application(s) Topical daily  dextrose 5%. 1000 milliLiter(s) IV Continuous <Continuous>  dextrose 5%. 1000 milliLiter(s) IV Continuous <Continuous>  sodium chloride 0.9%. 1000 milliLiter(s) IV Continuous <Continuous>  sodium chloride 0.9%. 1000 milliLiter(s) IV Continuous <Continuous>      PAST MEDICAL & SURGICAL HISTORY:  Essential hypertension  Hyperlipidemia, unspecified hyperlipidemia type   (coronary artery disease)  PVD (peripheral vascular disease) with claudication  Stented coronary artery  Type 2 diabetes mellitus  Stage 4 chronic kidney disease  COVID-19 vaccination refused  Stented coronary artery  PAD (peripheral artery disease)          FAMILY HISTORY:  Family history of essential hypertension        SOCIAL HISTORY:  unchanged    REVIEW OF SYSTEMS:  CONSTITUTIONAL:  fatigue    ENMT:  No difficulty hearing  NECK: No pain  RESPIRATORY: No Shortness of Breath  CARDIOVASCULAR: No chest pain, palpitations, passing out, dizziness, or leg swelling  GASTROINTESTINAL: No abdominal or epigastric pain. No nausea, vomiting, or hematemesis  NEUROLOGICAL: No headaches, memory loss, loss of strength, numbness, or tremors  SKIN: No itching, burning, rashes, or lesions   MUSCULOSKELETAL: No joint pain or swelling; No muscle, back, or extremity pain  PSYCHIATRIC: No depression  HEME/LYMPH: No easy bruising, or bleeding gums    [ x] All others negative	  [ ] Unable to obtain    PHYSICAL EXAM:  T(C): 36.8 (04-10-23 @ 09:53), Max: 37.2 (04-09-23 @ 20:32)  HR: 94 (04-10-23 @ 09:53) (89 - 105)  BP: 142/65 (04-10-23 @ 09:53) (142/65 - 153/77)  RR: 18 (04-10-23 @ 09:53) (18 - 18)  SpO2: 96% (04-10-23 @ 09:53) (96% - 99%)  Wt(kg): --  I&O's Summary    09 Apr 2023 07:01  -  10 Apr 2023 07:00  --------------------------------------------------------  IN: 480 mL / OUT: 1600 mL / NET: -1120 mL        Appearance: Normal	  HEENT:   Normal oral mucosa, PERRL, EOMI	  Lymphatic: No lymphadenopathy  Cardiovascular: Normal S1 S2, No JVD, No murmurs, No edema  Respiratory: Lungs clear to auscultation	  Psychiatry: A & O x 3, Mood & affect appropriate  Gastrointestinal:  Soft, Non-tender, + BS	  Skin: No rashes, No ecchymoses, No cyanosis	  Neurologic: Non-focal  Vascular Pulse Exam: Audible bilateral DP and PT signals  Foot Exam:  Left foot distal lateral hallux wound, 2nd digit dry gangrene  Right foot 2nd and 3rd digit dry gangrene, dorsal medial midfoot dry gangrene      LABS:	 	    CBC Full  -  ( 10 Apr 2023 06:22 )  WBC Count : 13.09 K/uL  Hemoglobin : 9.4 g/dL  Hematocrit : 27.3 %  Platelet Count - Automated : 247 K/uL  Mean Cell Volume : 78.2 fl  Mean Cell Hemoglobin : 26.9 pg  Mean Cell Hemoglobin Concentration : 34.4 gm/dL  Auto Neutrophil # : x  Auto Lymphocyte # : x  Auto Monocyte # : x  Auto Eosinophil # : x  Auto Basophil # : x  Auto Neutrophil % : x  Auto Lymphocyte % : x  Auto Monocyte % : x  Auto Eosinophil % : x  Auto Basophil % : x    04-10    140  |  107  |  54<H>  ----------------------------<  248<H>  4.4   |  22  |  2.51<H>  04-09    141  |  107  |  46<H>  ----------------------------<  183<H>  4.2   |  23  |  2.18<H>    Ca    9.2      10 Apr 2023 06:23  Ca    9.5      09 Apr 2023 06:41    TPro  7.5  /  Alb  3.4  /  TBili  0.3  /  DBili  x   /  AST  5<L>  /  ALT  7<L>  /  AlkPhos  94  04-10

## 2023-04-10 NOTE — PROGRESS NOTE ADULT - PROBLEM SELECTOR PLAN 2
Pt with stay at OSH on iv abx seen by pod and ID there.   - wound cx grew Citrobacter, staph simulans & helcococcus yassine  - c/w IV aztreonam 500mg q12h   - ID consulted, recs appreciated  - plan for OR with Podiatry tentatively Wed 4/12 for amputation  - patient currently medically optimized for OR  - cardiology clearance/risk stratification appreciated

## 2023-04-10 NOTE — PROGRESS NOTE ADULT - ATTENDING COMMENTS
transfer to [facility] for pvd/om/further vasc workup  #ckd stage 1v  mild uptrend today cr 2.5  serologic workup to be sent  ua neg prot/blood  #anemia  ferritin>400  monitor hb trend  #htn-cont bp meds  #for angiogram today  pt understands risk of DEBBIE, contrast  give IVF as above pre and post procedures  #PVD-s/p stent RSFA 4/7/23; plan for stent to L SFA today

## 2023-04-10 NOTE — PROGRESS NOTE ADULT - PROBLEM SELECTOR PLAN 1
Pt. with elevated serum creatinine; likely CKD. Documented in chart that he has CKD stage 4. No previous labs available for review but on admission pt noted to have a SCr of 2.64; and has remained elevated ranging between 2.2-2.7. Most recently SCr of 2.18. UA reviewed without evidence of significant hematuria or proteinuria. Agree with holding ACE/ARB at this time. Renal sonogram without evidence of hydronephrosis. Monitor for urinary retention. Monitor labs and urine output.   Pt underwent angiogram on 4/7 and planned for another one today per cardiology note yesterday; would recommend IVF hydration with NS at 75cc/hr for 6 hours before and after the procedure again today.   Monitor UOP and labs. Dose medications as per eGFR.

## 2023-04-10 NOTE — PROGRESS NOTE ADULT - PROBLEM SELECTOR PLAN 3
Unclear baseline.   - Cr uptrended today. Vascular aware  - renal ultrasound with no hydro  - Nephrology consulted, recs appreciated  - renally dose meds to GFR, avoid nephrotoxic agents  - monitor Cr on BMP

## 2023-04-10 NOTE — PROGRESS NOTE ADULT - SUBJECTIVE AND OBJECTIVE BOX
Samara Sung MD  Division of Hospital Medicine  [facility]   Available on Microsoft Teams (Mon-Fri 8am-5pm)    * messages preferred prior to calls  Other Times:  [phone]      Patient is a 72y old  Male who presents with a chief complaint of Transferred from [facility] for possible vascular intervention. (10 Apr 2023 15:16)      SUBJECTIVE / OVERNIGHT EVENTS: no acute events overnight. no fever, chills, chest pain, nor dyspnea. seen after angio/stent when he returned to the floor. no issues  ADDITIONAL REVIEW OF SYSTEMS:    Tele reviewed: SR with HR 80-110s    MEDICATIONS  (STANDING):  aspirin enteric coated 81 milliGRAM(s) Oral daily  atorvastatin 40 milliGRAM(s) Oral at bedtime  aztreonam  IVPB 500 milliGRAM(s) IV Intermittent every 12 hours  aztreonam  IVPB      chlorhexidine 2% Cloths 1 Application(s) Topical daily  cilostazol 100 milliGRAM(s) Oral two times a day  clopidogrel Tablet 75 milliGRAM(s) Oral daily  dextrose 5%. 1000 milliLiter(s) (100 mL/Hr) IV Continuous <Continuous>  dextrose 5%. 1000 milliLiter(s) (50 mL/Hr) IV Continuous <Continuous>  dextrose 50% Injectable 25 Gram(s) IV Push once  dextrose 50% Injectable 12.5 Gram(s) IV Push once  dextrose 50% Injectable 25 Gram(s) IV Push once  glucagon  Injectable 1 milliGRAM(s) IntraMuscular once  heparin   Injectable 5000 Unit(s) SubCutaneous every 8 hours  insulin glargine Injectable (LANTUS) 8 Unit(s) SubCutaneous at bedtime  insulin lispro (ADMELOG) corrective regimen sliding scale   SubCutaneous three times a day before meals  insulin lispro (ADMELOG) corrective regimen sliding scale   SubCutaneous at bedtime  insulin lispro Injectable (ADMELOG) 3 Unit(s) SubCutaneous three times a day before meals  isosorbide   mononitrate ER Tablet (IMDUR) 30 milliGRAM(s) Oral daily  metoprolol succinate ER 50 milliGRAM(s) Oral daily  NIFEdipine XL 30 milliGRAM(s) Oral daily  pantoprazole    Tablet 40 milliGRAM(s) Oral before breakfast  senna 2 Tablet(s) Oral at bedtime  sodium chloride 0.9%. 1000 milliLiter(s) (75 mL/Hr) IV Continuous <Continuous>  sodium chloride 0.9%. 1000 milliLiter(s) (50 mL/Hr) IV Continuous <Continuous>    MEDICATIONS  (PRN):  acetaminophen     Tablet .. 650 milliGRAM(s) Oral every 6 hours PRN Temp greater or equal to 38C (100.4F), Mild Pain (1 - 3)  dextrose Oral Gel 15 Gram(s) Oral once PRN Blood Glucose LESS THAN 70 milliGRAM(s)/deciliter  oxycodone    5 mG/acetaminophen 325 mG 1 Tablet(s) Oral every 6 hours PRN Severe Pain (7 - 10)      CAPILLARY BLOOD GLUCOSE      POCT Blood Glucose.: 268 mg/dL (10 Apr 2023 16:04)  POCT Blood Glucose.: 237 mg/dL (10 Apr 2023 13:09)  POCT Blood Glucose.: 210 mg/dL (10 Apr 2023 07:42)  POCT Blood Glucose.: 214 mg/dL (09 Apr 2023 21:12)  POCT Blood Glucose.: 235 mg/dL (09 Apr 2023 17:19)    I&O's Summary    09 Apr 2023 07:01  -  10 Apr 2023 07:00  --------------------------------------------------------  IN: 480 mL / OUT: 1600 mL / NET: -1120 mL        PHYSICAL EXAM:  Vital Signs Last 24 Hrs  T(C): 36.8 (10 Apr 2023 15:12), Max: 37.2 (09 Apr 2023 20:32)  T(F): 98.3 (10 Apr 2023 15:12), Max: 99 (09 Apr 2023 20:32)  HR: 98 (10 Apr 2023 15:12) (87 - 105)  BP: 162/91 (10 Apr 2023 15:12) (142/65 - 178/81)  BP(mean): 125 (10 Apr 2023 13:30) (97 - 125)  RR: 18 (10 Apr 2023 15:12) (18 - 18)  SpO2: 100% (10 Apr 2023 15:12) (96% - 100%)    Parameters below as of 10 Apr 2023 15:12  Patient On (Oxygen Delivery Method): room air      CONSTITUTIONAL: NAD, well-developed, well-groomed  EYES: PERRLA; conjunctiva and sclera clear  ENMT: Moist oral mucosa, no pharyngeal injection or exudates; normal dentition  NECK: Supple, no palpable masses; no thyromegaly  RESPIRATORY: Normal respiratory effort; lungs are clear to auscultation bilaterally  CARDIOVASCULAR: Regular rate and rhythm, normal S1 and S2, no murmur/rub/gallop; No lower extremity edema; Peripheral pulses are 2+ bilaterally  ABDOMEN: Soft, Nondistended, Nontender to palpation, normoactive bowel sounds  MUSCULOSKELETAL: No clubbing or cyanosis of digits; no joint swelling or tenderness to palpation  PSYCH: A+O to person, place, and time; affect appropriate  NEUROLOGY: CN 2-12 are intact and symmetric; no gross sensory deficits   SKIN: No rashes; no palpable lesions    LABS:                        9.4    13.09 )-----------( 247      ( 10 Apr 2023 06:22 )             27.3     04-10    140  |  107  |  54<H>  ----------------------------<  248<H>  4.4   |  22  |  2.51<H>    Ca    9.2      10 Apr 2023 06:23    TPro  7.5  /  Alb  3.4  /  TBili  0.3  /  DBili  x   /  AST  5<L>  /  ALT  7<L>  /  AlkPhos  94  04-10          RADIOLOGY & ADDITIONAL TESTS:  Results Reviewed: Cr uptrended  Imaging Personally Reviewed:  Electrocardiogram Personally Reviewed:    COORDINATION OF CARE:  Care Discussed with Consultants/Other Providers [Y]: medicine DELICIA Gunn  Prior or Outpatient Records Reviewed [Y/N]:

## 2023-04-10 NOTE — PROGRESS NOTE ADULT - PROBLEM SELECTOR PLAN 6
stable.  - c/w Toprol XL 50mg daily  - c/w nifedipine XL 30mg daily  - c/w imdur ER 30mg daily  - monitor vitals

## 2023-04-11 ENCOUNTER — TRANSCRIPTION ENCOUNTER (OUTPATIENT)
Age: 73
End: 2023-04-11

## 2023-04-11 LAB
ANION GAP SERPL CALC-SCNC: 12 MMOL/L — SIGNIFICANT CHANGE UP (ref 5–17)
BLD GP AB SCN SERPL QL: NEGATIVE — SIGNIFICANT CHANGE UP
BUN SERPL-MCNC: 50 MG/DL — HIGH (ref 7–23)
CALCIUM SERPL-MCNC: 9.2 MG/DL — SIGNIFICANT CHANGE UP (ref 8.4–10.5)
CHLORIDE SERPL-SCNC: 107 MMOL/L — SIGNIFICANT CHANGE UP (ref 96–108)
CO2 SERPL-SCNC: 21 MMOL/L — LOW (ref 22–31)
CREAT SERPL-MCNC: 2.24 MG/DL — HIGH (ref 0.5–1.3)
EGFR: 31 ML/MIN/1.73M2 — LOW
GLUCOSE BLDC GLUCOMTR-MCNC: 123 MG/DL — HIGH (ref 70–99)
GLUCOSE BLDC GLUCOMTR-MCNC: 231 MG/DL — HIGH (ref 70–99)
GLUCOSE BLDC GLUCOMTR-MCNC: 273 MG/DL — HIGH (ref 70–99)
GLUCOSE BLDC GLUCOMTR-MCNC: 338 MG/DL — HIGH (ref 70–99)
GLUCOSE SERPL-MCNC: 213 MG/DL — HIGH (ref 70–99)
HCT VFR BLD CALC: 27.8 % — LOW (ref 39–50)
HGB BLD-MCNC: 9.4 G/DL — LOW (ref 13–17)
MCHC RBC-ENTMCNC: 26.6 PG — LOW (ref 27–34)
MCHC RBC-ENTMCNC: 33.8 GM/DL — SIGNIFICANT CHANGE UP (ref 32–36)
MCV RBC AUTO: 78.5 FL — LOW (ref 80–100)
NRBC # BLD: 0 /100 WBCS — SIGNIFICANT CHANGE UP (ref 0–0)
PLATELET # BLD AUTO: 243 K/UL — SIGNIFICANT CHANGE UP (ref 150–400)
POTASSIUM SERPL-MCNC: 4.2 MMOL/L — SIGNIFICANT CHANGE UP (ref 3.5–5.3)
POTASSIUM SERPL-SCNC: 4.2 MMOL/L — SIGNIFICANT CHANGE UP (ref 3.5–5.3)
RBC # BLD: 3.54 M/UL — LOW (ref 4.2–5.8)
RBC # FLD: 17.1 % — HIGH (ref 10.3–14.5)
RH IG SCN BLD-IMP: POSITIVE — SIGNIFICANT CHANGE UP
SARS-COV-2 RNA SPEC QL NAA+PROBE: SIGNIFICANT CHANGE UP
SODIUM SERPL-SCNC: 140 MMOL/L — SIGNIFICANT CHANGE UP (ref 135–145)
WBC # BLD: 12.93 K/UL — HIGH (ref 3.8–10.5)
WBC # FLD AUTO: 12.93 K/UL — HIGH (ref 3.8–10.5)

## 2023-04-11 PROCEDURE — 99233 SBSQ HOSP IP/OBS HIGH 50: CPT

## 2023-04-11 PROCEDURE — 99232 SBSQ HOSP IP/OBS MODERATE 35: CPT

## 2023-04-11 PROCEDURE — 99233 SBSQ HOSP IP/OBS HIGH 50: CPT | Mod: GC

## 2023-04-11 RX ORDER — NIFEDIPINE 30 MG
60 TABLET, EXTENDED RELEASE 24 HR ORAL DAILY
Refills: 0 | Status: CANCELLED | OUTPATIENT
Start: 2023-04-12 | End: 2023-04-12

## 2023-04-11 RX ORDER — INSULIN LISPRO 100/ML
5 VIAL (ML) SUBCUTANEOUS
Refills: 0 | Status: DISCONTINUED | OUTPATIENT
Start: 2023-04-11 | End: 2023-04-12

## 2023-04-11 RX ORDER — INSULIN LISPRO 100/ML
VIAL (ML) SUBCUTANEOUS AT BEDTIME
Refills: 0 | Status: DISCONTINUED | OUTPATIENT
Start: 2023-04-11 | End: 2023-04-12

## 2023-04-11 RX ORDER — SODIUM CHLORIDE 9 MG/ML
1000 INJECTION INTRAMUSCULAR; INTRAVENOUS; SUBCUTANEOUS
Refills: 0 | Status: DISCONTINUED | OUTPATIENT
Start: 2023-04-11 | End: 2023-04-12

## 2023-04-11 RX ORDER — INSULIN LISPRO 100/ML
VIAL (ML) SUBCUTANEOUS
Refills: 0 | Status: DISCONTINUED | OUTPATIENT
Start: 2023-04-11 | End: 2023-04-12

## 2023-04-11 RX ORDER — NIFEDIPINE 30 MG
30 TABLET, EXTENDED RELEASE 24 HR ORAL ONCE
Refills: 0 | Status: COMPLETED | OUTPATIENT
Start: 2023-04-11 | End: 2023-04-11

## 2023-04-11 RX ORDER — INSULIN GLARGINE 100 [IU]/ML
6 INJECTION, SOLUTION SUBCUTANEOUS AT BEDTIME
Refills: 0 | Status: DISCONTINUED | OUTPATIENT
Start: 2023-04-11 | End: 2023-04-12

## 2023-04-11 RX ADMIN — CLOPIDOGREL BISULFATE 75 MILLIGRAM(S): 75 TABLET, FILM COATED ORAL at 12:03

## 2023-04-11 RX ADMIN — Medication 3 UNIT(S): at 08:19

## 2023-04-11 RX ADMIN — Medication 81 MILLIGRAM(S): at 12:03

## 2023-04-11 RX ADMIN — Medication 5 UNIT(S): at 12:25

## 2023-04-11 RX ADMIN — INSULIN GLARGINE 6 UNIT(S): 100 INJECTION, SOLUTION SUBCUTANEOUS at 21:40

## 2023-04-11 RX ADMIN — HEPARIN SODIUM 5000 UNIT(S): 5000 INJECTION INTRAVENOUS; SUBCUTANEOUS at 21:40

## 2023-04-11 RX ADMIN — Medication 2: at 08:19

## 2023-04-11 RX ADMIN — Medication 50 MILLIGRAM(S): at 05:40

## 2023-04-11 RX ADMIN — HEPARIN SODIUM 5000 UNIT(S): 5000 INJECTION INTRAVENOUS; SUBCUTANEOUS at 05:41

## 2023-04-11 RX ADMIN — PANTOPRAZOLE SODIUM 40 MILLIGRAM(S): 20 TABLET, DELAYED RELEASE ORAL at 05:43

## 2023-04-11 RX ADMIN — Medication 50 MILLIGRAM(S): at 05:39

## 2023-04-11 RX ADMIN — CILOSTAZOL 100 MILLIGRAM(S): 100 TABLET ORAL at 05:39

## 2023-04-11 RX ADMIN — SODIUM CHLORIDE 75 MILLILITER(S): 9 INJECTION INTRAMUSCULAR; INTRAVENOUS; SUBCUTANEOUS at 14:07

## 2023-04-11 RX ADMIN — Medication 30 MILLIGRAM(S): at 11:07

## 2023-04-11 RX ADMIN — Medication 6: at 18:15

## 2023-04-11 RX ADMIN — ISOSORBIDE MONONITRATE 30 MILLIGRAM(S): 60 TABLET, EXTENDED RELEASE ORAL at 12:25

## 2023-04-11 RX ADMIN — ATORVASTATIN CALCIUM 40 MILLIGRAM(S): 80 TABLET, FILM COATED ORAL at 21:40

## 2023-04-11 RX ADMIN — Medication 4: at 12:24

## 2023-04-11 RX ADMIN — Medication 30 MILLIGRAM(S): at 05:39

## 2023-04-11 RX ADMIN — Medication 5 UNIT(S): at 18:06

## 2023-04-11 RX ADMIN — Medication 50 MILLIGRAM(S): at 18:05

## 2023-04-11 RX ADMIN — CHLORHEXIDINE GLUCONATE 1 APPLICATION(S): 213 SOLUTION TOPICAL at 12:03

## 2023-04-11 RX ADMIN — CILOSTAZOL 100 MILLIGRAM(S): 100 TABLET ORAL at 18:05

## 2023-04-11 NOTE — PROGRESS NOTE ADULT - SUBJECTIVE AND OBJECTIVE BOX
Samara Sung MD  Division of Hospital Medicine  [facility]   Available on Microsoft Teams (Mon-Fri 8am-5pm)    * messages preferred prior to calls  Other Times:  [phone]      Patient is a 72y old  Male who presents with a chief complaint of Transferred from [facility] for possible vascular intervention. (11 Apr 2023 09:16)      SUBJECTIVE / OVERNIGHT EVENTS: no acute events overnight. no fever, chills, chest pain, nor dyspnea. feels well without complaints. aware plan for OR tomorrow with podiatry for amputation  ADDITIONAL REVIEW OF SYSTEMS:    Tele reviewed: SR with HR 80-100s    MEDICATIONS  (STANDING):  aspirin enteric coated 81 milliGRAM(s) Oral daily  atorvastatin 40 milliGRAM(s) Oral at bedtime  aztreonam  IVPB 500 milliGRAM(s) IV Intermittent every 12 hours  aztreonam  IVPB      chlorhexidine 2% Cloths 1 Application(s) Topical daily  cilostazol 100 milliGRAM(s) Oral two times a day  clopidogrel Tablet 75 milliGRAM(s) Oral daily  dextrose 5%. 1000 milliLiter(s) (50 mL/Hr) IV Continuous <Continuous>  dextrose 5%. 1000 milliLiter(s) (100 mL/Hr) IV Continuous <Continuous>  dextrose 50% Injectable 25 Gram(s) IV Push once  dextrose 50% Injectable 12.5 Gram(s) IV Push once  dextrose 50% Injectable 25 Gram(s) IV Push once  glucagon  Injectable 1 milliGRAM(s) IntraMuscular once  heparin   Injectable 5000 Unit(s) SubCutaneous every 8 hours  insulin glargine Injectable (LANTUS) 8 Unit(s) SubCutaneous at bedtime  insulin lispro (ADMELOG) corrective regimen sliding scale   SubCutaneous three times a day before meals  insulin lispro (ADMELOG) corrective regimen sliding scale   SubCutaneous at bedtime  insulin lispro Injectable (ADMELOG) 5 Unit(s) SubCutaneous three times a day before meals  isosorbide   mononitrate ER Tablet (IMDUR) 30 milliGRAM(s) Oral daily  metoprolol succinate ER 50 milliGRAM(s) Oral daily  pantoprazole    Tablet 40 milliGRAM(s) Oral before breakfast  senna 2 Tablet(s) Oral at bedtime  sodium chloride 0.9%. 1000 milliLiter(s) (75 mL/Hr) IV Continuous <Continuous>  sodium chloride 0.9%. 1000 milliLiter(s) (50 mL/Hr) IV Continuous <Continuous>  sodium chloride 0.9%. 1000 milliLiter(s) (75 mL/Hr) IV Continuous <Continuous>    MEDICATIONS  (PRN):  acetaminophen     Tablet .. 650 milliGRAM(s) Oral every 6 hours PRN Temp greater or equal to 38C (100.4F), Mild Pain (1 - 3)  dextrose Oral Gel 15 Gram(s) Oral once PRN Blood Glucose LESS THAN 70 milliGRAM(s)/deciliter  oxycodone    5 mG/acetaminophen 325 mG 1 Tablet(s) Oral every 6 hours PRN Severe Pain (7 - 10)      CAPILLARY BLOOD GLUCOSE      POCT Blood Glucose.: 338 mg/dL (11 Apr 2023 11:38)  POCT Blood Glucose.: 231 mg/dL (11 Apr 2023 07:49)  POCT Blood Glucose.: 256 mg/dL (10 Apr 2023 21:22)  POCT Blood Glucose.: 268 mg/dL (10 Apr 2023 16:04)    I&O's Summary    11 Apr 2023 07:01  -  11 Apr 2023 13:26  --------------------------------------------------------  IN: 840 mL / OUT: 0 mL / NET: 840 mL        PHYSICAL EXAM:  Vital Signs Last 24 Hrs  T(C): 37.2 (11 Apr 2023 10:51), Max: 37.2 (11 Apr 2023 10:51)  T(F): 98.9 (11 Apr 2023 10:51), Max: 98.9 (11 Apr 2023 10:51)  HR: 96 (11 Apr 2023 10:51) (82 - 100)  BP: 146/77 (11 Apr 2023 10:51) (146/77 - 191/91)  BP(mean): 125 (10 Apr 2023 13:30) (125 - 125)  RR: 18 (11 Apr 2023 10:51) (18 - 18)  SpO2: 98% (11 Apr 2023 10:51) (98% - 100%)    Parameters below as of 11 Apr 2023 10:51  Patient On (Oxygen Delivery Method): room air    CONSTITUTIONAL: NAD, well-developed, well-groomed  EYES: PERRLA; conjunctiva and sclera clear  ENMT: Moist oral mucosa, no pharyngeal injection or exudates; normal dentition  NECK: Supple, no palpable masses; no thyromegaly  RESPIRATORY: Normal respiratory effort; lungs are clear to auscultation bilaterally  CARDIOVASCULAR: Regular rate and rhythm, normal S1 and S2, no murmur/rub/gallop; No lower extremity edema  ABDOMEN: Soft, Nondistended, Nontender to palpation, normoactive bowel sounds  MUSCULOSKELETAL: No clubbing or cyanosis of digits; b/l feet in dressing with exposed areas c/d/i  PSYCH: A+O to person, place, and time; affect appropriate  NEUROLOGY: CN 2-12 are intact and symmetric; no gross sensory deficits   SKIN: No rashes; no palpable lesions, b/l venous stasis changes, b/l groin sites c/d/i    LABS:                        9.4    12.93 )-----------( 243      ( 11 Apr 2023 06:07 )             27.8     04-11    140  |  107  |  50<H>  ----------------------------<  213<H>  4.2   |  21<L>  |  2.24<H>    Ca    9.2      11 Apr 2023 06:06    TPro  7.5  /  Alb  3.4  /  TBili  0.3  /  DBili  x   /  AST  5<L>  /  ALT  7<L>  /  AlkPhos  94  04-10        RADIOLOGY & ADDITIONAL TESTS:  Results Reviewed: Cr stable/slightly improved, H/H stable, leukocytosis downtrending  Imaging Personally Reviewed:  Electrocardiogram Personally Reviewed:    COORDINATION OF CARE:  Care Discussed with Consultants/Other Providers [Y]: Cardiology Attending Dr. Liu, medicine NP Velia  Prior or Outpatient Records Reviewed [Y]: Nephrology, Cardiology progress notes

## 2023-04-11 NOTE — PROGRESS NOTE ADULT - SUBJECTIVE AND OBJECTIVE BOX
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 The patient was initially treated with IV ABx however transferred to [facility] for further evaluation. The nephrology team was consulted for elevated Serum creatinine levels and possible need for vascular procedures during the hospital course.    24 hour events/subjective:  Pt. seen and examined earlier today.  Denied any chest pain, shortness of breath, nausea, vomiting, or abdominal pain.   No acute issues noted overnight.      PAST HISTORY  --------------------------------------------------------------------------------  No significant changes to PMH, PSH, FHx, SHx, unless otherwise noted    ALLERGIES & MEDICATIONS  --------------------------------------------------------------------------------  Allergies    penicillin (Unknown)    Intolerances      Standing Inpatient Medications  aspirin enteric coated 81 milliGRAM(s) Oral daily  atorvastatin 40 milliGRAM(s) Oral at bedtime  aztreonam  IVPB      aztreonam  IVPB 500 milliGRAM(s) IV Intermittent every 12 hours  chlorhexidine 2% Cloths 1 Application(s) Topical daily  cilostazol 100 milliGRAM(s) Oral two times a day  clopidogrel Tablet 75 milliGRAM(s) Oral daily  dextrose 5%. 1000 milliLiter(s) IV Continuous <Continuous>  dextrose 5%. 1000 milliLiter(s) IV Continuous <Continuous>  dextrose 50% Injectable 25 Gram(s) IV Push once  dextrose 50% Injectable 12.5 Gram(s) IV Push once  dextrose 50% Injectable 25 Gram(s) IV Push once  glucagon  Injectable 1 milliGRAM(s) IntraMuscular once  heparin   Injectable 5000 Unit(s) SubCutaneous every 8 hours  insulin glargine Injectable (LANTUS) 8 Unit(s) SubCutaneous at bedtime  insulin lispro (ADMELOG) corrective regimen sliding scale   SubCutaneous three times a day before meals  insulin lispro (ADMELOG) corrective regimen sliding scale   SubCutaneous at bedtime  insulin lispro Injectable (ADMELOG) 3 Unit(s) SubCutaneous three times a day before meals  isosorbide   mononitrate ER Tablet (IMDUR) 30 milliGRAM(s) Oral daily  metoprolol succinate ER 50 milliGRAM(s) Oral daily  NIFEdipine XL 30 milliGRAM(s) Oral daily  pantoprazole    Tablet 40 milliGRAM(s) Oral before breakfast  senna 2 Tablet(s) Oral at bedtime  sodium chloride 0.9%. 1000 milliLiter(s) IV Continuous <Continuous>  sodium chloride 0.9%. 1000 milliLiter(s) IV Continuous <Continuous>    PRN Inpatient Medications  acetaminophen     Tablet .. 650 milliGRAM(s) Oral every 6 hours PRN  dextrose Oral Gel 15 Gram(s) Oral once PRN  oxycodone    5 mG/acetaminophen 325 mG 1 Tablet(s) Oral every 6 hours PRN      REVIEW OF SYSTEMS      All other systems were reviewed and are negative, except as noted.    VITALS/PHYSICAL EXAM  --------------------------------------------------------------------------------  T(C): 36.8 (04-11-23 @ 05:02), Max: 37.1 (04-10-23 @ 20:15)  HR: 82 (04-11-23 @ 05:02) (82 - 100)  BP: 173/86 (04-11-23 @ 05:02) (142/65 - 191/91)  RR: 18 (04-11-23 @ 05:02) (18 - 18)  SpO2: 100% (04-11-23 @ 05:02) (96% - 100%)  Wt(kg): --      04-09-23 @ 07:01  -  04-10-23 @ 07:00  --------------------------------------------------------  IN: 480 mL / OUT: 1600 mL / NET: -1120 mL      Physical Exam:  	Gen: NAD  	HEENT: MMM  	Pulm: CTA B/L  	CV: S1S2  	Abd: Soft, +BS   	Ext: No LE edema B/L  	Neuro: Awake  	Skin: Warm and dry      LABS/STUDIES  --------------------------------------------------------------------------------              9.4    12.93 >-----------<  243      [04-11-23 @ 06:07]              27.8     140  |  107  |  54  ----------------------------<  248      [04-10-23 @ 06:23]  4.4   |  22  |  2.51        Ca     9.2     [04-10-23 @ 06:23]    TPro  7.5  /  Alb  3.4  /  TBili  0.3  /  DBili  x   /  AST  5   /  ALT  7   /  AlkPhos  94  [04-10-23 @ 06:23]          Creatinine Trend:  SCr 2.51 [04-10 @ 06:23]  SCr 2.18 [04-09 @ 06:41]  SCr 2.39 [04-08 @ 07:37]  SCr 2.70 [04-07 @ 05:55]  SCr 2.48 [04-06 @ 06:44]    Urinalysis - [04-04-23 @ 23:03]      Color Light Yellow / Appearance Clear / SG 1.013 / pH 6.0      Gluc Negative / Ketone Negative  / Bili Negative / Urobili Negative       Blood Negative / Protein Trace / Leuk Est Negative / Nitrite Negative      RBC 1 / WBC 0 / Hyaline  / Gran  / Sq Epi  / Non Sq Epi 0 / Bacteria Negative    Urine Creatinine 91      [04-04-23 @ 23:04]  Urine Protein 16      [04-04-23 @ 23:04]    Iron 38, TIBC 165, %sat 23      [04-07-23 @ 05:55]  Ferritin 401      [04-07-23 @ 05:55]    HCV 0.16, Nonreact      [04-05-23 @ 07:26]

## 2023-04-11 NOTE — PROGRESS NOTE ADULT - ATTENDING COMMENTS
transfer to [facility] for pvd/om/further vasc workup  #ckd stage 1v  fluctuating cr but overall stable  serologic workup pending  ua neg prot/blood  #anemia  ferritin>400  monitor hb trend  #htn-cont bp meds  #s/p angiograms, last 4-10/stable cr thus far;   #PVD-plan for podiatry to OR 4/12  #rec ivf hydration today

## 2023-04-11 NOTE — PROGRESS NOTE ADULT - ASSESSMENT
72M presents with bilateral foot dry gangrene and ischemic changes.  - Pt seen and evaluated.  - Afebrile, WBC 12.93  - Bilateral 1st and second digit gangrene extending to MPJ w/ malodor, scant purulence from left hallux today  - Bilateral foot xray: b/l 1st distal phalanx OM, right foot 2nd digit distal phalanx OM   - Left foot wound culture citrobacter freundii, TAWANDA Medina  - ID recs monitor off IV abx, appreciated  - Pt is s/p b/l LE angiograms: RLE angiogram w/ SFA stent, LLE angio w/ 2-vessel r/o to foot. Vasc-cards rec acceptable to proceed with pods surgery  - Pt is scheduled for bilateral partial 1st and second ray resections tomorrow Weds 4/12 @1030AM  - NPO midnight, AM labs, coags, type and screen, updated COVID swab  - Documented med and cards optimization appreciated  - Discussed with attending

## 2023-04-11 NOTE — PRE-ANESTHESIA EVALUATION ADULT - NSPROPOSEDPROCEDFT_GEN_ALL_CORE
Maurice Ojeda discharged. Discharge instructions including s/s to return to ED, follow up appointments, hydration importance, monitoring for worsening symptoms importance, medication administration for prescriptions provided to patient mother. mother verbalizes understanding with no further questions or concerns.   Copy of discharge instructions provided to patient mother. Signed copy in chart.   Prescriptions for clarithromycin provided to patient mother.   Patient out of department with mother. Patient in NAD, awake, alert, pink, interactive and acting age appropriate on discharge. Pt tolerated 4oz apple juice in triage.          b/l toe amps

## 2023-04-11 NOTE — PROGRESS NOTE ADULT - PROBLEM SELECTOR PLAN 1
Pt. with elevated serum creatinine; likely CKD. Documented in chart that he has CKD stage 4. No previous labs available for review but on admission pt noted to have a SCr of 2.64; and has remained elevated ranging between 2.2-2.7. Most recently SCr of 2.5; pending AM labs. UA reviewed without evidence of significant hematuria or proteinuria. Agree with holding ACE/ARB at this time. Renal sonogram without evidence of hydronephrosis.   Pt underwent angiogram on 4/7 then again with intervention on 4/10.   Monitor for urinary retention. Monitor labs and urine output. Dose medications as per eGFR.

## 2023-04-11 NOTE — PROGRESS NOTE ADULT - PROBLEM SELECTOR PLAN 6
BP elevated  - c/w Toprol XL 50mg daily  - increase nifedipine XL to 60mg daily  - c/w imdur ER 30mg daily  - monitor vitals

## 2023-04-11 NOTE — PROGRESS NOTE ADULT - PROBLEM SELECTOR PLAN 4
HbA1c 5.5%  - c/w sliding scale  - decrease lantus to 6 units qHS given patient will be NPO after midnight       * post-op will need to increase his lantus to at least 12 units qHS. will see his FS tomorrow  - increase pre-meal admelog to 5 units TID qAC, hold if NPO  - monitor FS qAC + qHS

## 2023-04-11 NOTE — PROGRESS NOTE ADULT - PROBLEM SELECTOR PLAN 2
Pt. with anemia. Iron studies reviewed. Monitor H/H. Transfuse as per primary team.     If any questions, please feel free to contact me     Jerry Gamble  Nephrology Fellow  [facility] Pager: [phone].

## 2023-04-11 NOTE — PROGRESS NOTE ADULT - SUBJECTIVE AND OBJECTIVE BOX
Patient is a 72y old  Male who presents with a chief complaint of Transferred from [facility] for possible vascular intervention. (11 Apr 2023 06:23)       INTERVAL HPI/OVERNIGHT EVENTS:  Patient seen and evaluated at bedside.  Pt is resting comfortable in NAD. Denies N/V/F/C.    Allergies    penicillin (Unknown)    Intolerances        Vital Signs Last 24 Hrs  T(C): 36.8 (11 Apr 2023 05:02), Max: 37.1 (10 Apr 2023 20:15)  T(F): 98.2 (11 Apr 2023 05:02), Max: 98.7 (10 Apr 2023 20:15)  HR: 82 (11 Apr 2023 05:02) (82 - 100)  BP: 173/86 (11 Apr 2023 05:02) (142/65 - 191/91)  BP(mean): 125 (10 Apr 2023 13:30) (97 - 125)  RR: 18 (11 Apr 2023 05:02) (18 - 18)  SpO2: 100% (11 Apr 2023 05:02) (96% - 100%)    Parameters below as of 11 Apr 2023 05:02  Patient On (Oxygen Delivery Method): room air        LABS:                        9.4    12.93 )-----------( 243      ( 11 Apr 2023 06:07 )             27.8     04-11    140  |  107  |  50<H>  ----------------------------<  213<H>  4.2   |  21<L>  |  2.24<H>    Ca    9.2      11 Apr 2023 06:06    TPro  7.5  /  Alb  3.4  /  TBili  0.3  /  DBili  x   /  AST  5<L>  /  ALT  7<L>  /  AlkPhos  94  04-10        CAPILLARY BLOOD GLUCOSE      POCT Blood Glucose.: 231 mg/dL (11 Apr 2023 07:49)  POCT Blood Glucose.: 256 mg/dL (10 Apr 2023 21:22)  POCT Blood Glucose.: 268 mg/dL (10 Apr 2023 16:04)  POCT Blood Glucose.: 237 mg/dL (10 Apr 2023 13:09)      Lower Extremity Physical Exam:  Vascular: DP/PT 0/4, B/L, CFT <3 seconds B/L, Temperature gradient warm to cool, B/L.   Neuro: Epicritic sensation diminished to the level of the toes, B/L.  Musculoskeletal/Ortho: Unremarkable.  Skin: Left foot distal lateral hallux wound to bone, fibronecrotic wound bed with surrounding ischemic changes, mild malodor, no erythema, 2nd digit ischemic changes to PIPJ. Right hallux dry gangrene to the level of IPJ, R foot 2nd digit dry gangrene to MPJ, R dorsomedial eschar to subQ, well adhered edges, R foot lateral 5th met eschar with well adhered edges, no boggyness, no wet conversion.     RADIOLOGY & ADDITIONAL TESTS:

## 2023-04-11 NOTE — PROGRESS NOTE ADULT - SUBJECTIVE AND OBJECTIVE BOX
DATE OF SERVICE: 04-11-23 @ 13:36    Patient is a 72y old  Male who presents with a chief complaint of Transferred from [facility] for possible vascular intervention. (11 Apr 2023 09:16)      INTERVAL HISTORY: Offers no complaints.     REVIEW OF SYSTEMS:  CONSTITUTIONAL: No weakness  EYES/ENT: No visual changes;  No throat pain   NECK: No pain or stiffness  RESPIRATORY: No cough, wheezing; No shortness of breath  CARDIOVASCULAR: No chest pain or palpitations  GASTROINTESTINAL: No abdominal  pain. No nausea, vomiting, or hematemesis  GENITOURINARY: No dysuria, frequency or hematuria  NEUROLOGICAL: No stroke like symptoms  SKIN: No rashes    TELEMETRY Personally reviewed: /ST   	  MEDICATIONS:  isosorbide   mononitrate ER Tablet (IMDUR) 30 milliGRAM(s) Oral daily  metoprolol succinate ER 50 milliGRAM(s) Oral daily        PHYSICAL EXAM:  T(C): 37.2 (04-11-23 @ 10:51), Max: 37.2 (04-11-23 @ 10:51)  HR: 96 (04-11-23 @ 10:51) (82 - 100)  BP: 146/77 (04-11-23 @ 10:51) (146/77 - 191/91)  RR: 18 (04-11-23 @ 10:51) (18 - 18)  SpO2: 98% (04-11-23 @ 10:51) (98% - 100%)  Wt(kg): --  I&O's Summary    11 Apr 2023 07:01  -  11 Apr 2023 13:36  --------------------------------------------------------  IN: 840 mL / OUT: 0 mL / NET: 840 mL          Appearance: In no distress	  HEENT:    PERRL, EOMI	  Cardiovascular:  S1 S2, No JVD  Respiratory: Lungs clear to auscultation	  Gastrointestinal:  Soft, Non-tender, + BS	  Vascularature:  No edema of LE  Psychiatric: Appropriate affect   Neuro: no acute focal deficits                               9.4    12.93 )-----------( 243      ( 11 Apr 2023 06:07 )             27.8     04-11    140  |  107  |  50<H>  ----------------------------<  213<H>  4.2   |  21<L>  |  2.24<H>    Ca    9.2      11 Apr 2023 06:06    TPro  7.5  /  Alb  3.4  /  TBili  0.3  /  DBili  x   /  AST  5<L>  /  ALT  7<L>  /  AlkPhos  94  04-10        Labs personally reviewed      ASSESSMENT/PLAN: 	    71 y/o M--patient with a history of type 2 DM complicated with CKD4, PAD with unclear past stenting hx in the RLE, essential HTN tobacco use,  transferred from [facility] 3/30/23 with spouse reporting the patient had lost his R hallux toenail about a week prior to that admission and noted dark discoloration of the R hallux and 2nd toe with foul odor emanating from the R foot    Plan of Care:    #Pre-op risk stratification  - S/p peripheral angio 4/7/2023 revealing  of SFA bilaterally s/p DCBA and stenting of R SFA. Tolerated procedure well.   -  Pt displays no evidence of coronary ischemia  - TTE shows normal LV systolic function with no significant structural abnormalities.   - From a cardiac standpoint, Pt is optimized for vascular intervention  - Pt is intermediate to moderate risk for bilateral partial 1st and second ray resections. No contraindication to proceed.   - Further recommendations to follow pending clinical course.     #PAD  - Continue Cilostazol, and DAPT therapy  - Statin therapy  - Vascular input appreciated  - Plan for bilateral partial 1st and second ray resections tomorrow Weds 4/12    #CKD  - Renal input appreciated    #CAD  - S/p PCI   -Continue Plavix, statin and BB        Lalitha Jefferson, SUHA-NP   Cedric Liu DO [facility]  Cardiovascular Medicine  [address], [address]  Available through call or text on [employer] TEAMs  Office: [phone]   DATE OF SERVICE: 04-11-23 @ 13:36    Patient is a 72y old  Male who presents with a chief complaint of Transferred from [facility] for possible vascular intervention. (11 Apr 2023 09:16)      INTERVAL HISTORY: Offers no complaints.     REVIEW OF SYSTEMS:  CONSTITUTIONAL: No weakness  EYES/ENT: No visual changes;  No throat pain   NECK: No pain or stiffness  RESPIRATORY: No cough, wheezing; No shortness of breath  CARDIOVASCULAR: No chest pain or palpitations  GASTROINTESTINAL: No abdominal  pain. No nausea, vomiting, or hematemesis  GENITOURINARY: No dysuria, frequency or hematuria  NEUROLOGICAL: No stroke like symptoms  SKIN: No rashes    TELEMETRY Personally reviewed: /ST   	  MEDICATIONS:  isosorbide   mononitrate ER Tablet (IMDUR) 30 milliGRAM(s) Oral daily  metoprolol succinate ER 50 milliGRAM(s) Oral daily        PHYSICAL EXAM:  T(C): 37.2 (04-11-23 @ 10:51), Max: 37.2 (04-11-23 @ 10:51)  HR: 96 (04-11-23 @ 10:51) (82 - 100)  BP: 146/77 (04-11-23 @ 10:51) (146/77 - 191/91)  RR: 18 (04-11-23 @ 10:51) (18 - 18)  SpO2: 98% (04-11-23 @ 10:51) (98% - 100%)  Wt(kg): --  I&O's Summary    11 Apr 2023 07:01  -  11 Apr 2023 13:36  --------------------------------------------------------  IN: 840 mL / OUT: 0 mL / NET: 840 mL          Appearance: In no distress	  HEENT:    PERRL, EOMI	  Cardiovascular:  S1 S2, No JVD  Respiratory: Lungs clear to auscultation	  Gastrointestinal:  Soft, Non-tender, + BS	  Vascularature:  No edema of LE  Psychiatric: Appropriate affect   Neuro: no acute focal deficits                               9.4    12.93 )-----------( 243      ( 11 Apr 2023 06:07 )             27.8     04-11    140  |  107  |  50<H>  ----------------------------<  213<H>  4.2   |  21<L>  |  2.24<H>    Ca    9.2      11 Apr 2023 06:06    TPro  7.5  /  Alb  3.4  /  TBili  0.3  /  DBili  x   /  AST  5<L>  /  ALT  7<L>  /  AlkPhos  94  04-10        Labs personally reviewed      ASSESSMENT/PLAN: 	    71 y/o M--patient with a history of type 2 DM complicated with CKD4, PAD with unclear past stenting hx in the RLE, essential HTN tobacco use,  transferred from [facility] 3/30/23 with spouse reporting the patient had lost his R hallux toenail about a week prior to that admission and noted dark discoloration of the R hallux and 2nd toe with foul odor emanating from the R foot    Plan of Care:    #Pre-op risk stratification  - S/p peripheral angio 4/7/2023 revealing  of SFA bilaterally s/p DCBA and stenting of R SFA. Tolerated procedure well.   -  Pt displays no evidence of coronary ischemia  - TTE shows normal LV systolic function with no significant structural abnormalities.   - From a cardiac standpoint, Pt is optimized for vascular intervention  - Pt is intermediate to moderate risk for bilateral partial 1st and second ray resections. No contraindication to proceed.   - Further recommendations to follow pending clinical course.     #PAD  - Continue Cilostazol, and DAPT therapy  - Statin therapy  - Vascular input appreciated  - Plan for bilateral partial 1st and second ray resections tomorrow Weds 4/12    #CKD  - Renal input appreciated    #CAD  - S/p PCI   -Continue Plavix, statin and BB    #HTN  - ACE/ARB on hold d/t PRIYA  - c/w Imdur 30mg PO daily  - c/w Metoprolol 50mg PO daily (there is room to increase although it likely wont significantly improve BP)  - Nifedipine 30mg PO daily initiated.    Lalitha Jefferson, AG-NP   Cedric Liu DO [facility]  Cardiovascular Medicine  [address], [address]  Available through call or text on [employer] TEAMs  Office: [phone]

## 2023-04-11 NOTE — PROGRESS NOTE ADULT - PROBLEM SELECTOR PLAN 3
Unclear baseline.   - Cr overall stable/slightly improved today  - renal ultrasound with no hydro  - Nephrology consulted, recs appreciated  - will give IV hydration today as nephrology recs given recent contrast load yesterday  - renally dose meds to GFR, avoid nephrotoxic agents  - monitor Cr on BMP

## 2023-04-11 NOTE — PROGRESS NOTE ADULT - ASSESSMENT
73 yo male with PMH of CKD4, PAD s/p stent, CAD, HTN, T2DM, current smoker who p/w LE dry gangrene and osteomyelitis now s/p R SFA stent on 4/7 and L SFA stent on 4/10 now planned for OR for amputation with podiatry on 4/12/23.

## 2023-04-11 NOTE — PROGRESS NOTE ADULT - PROBLEM SELECTOR PLAN 1
VA arterial duplex showing mod severe disease.   - s/p peripheral angio 4/7/2023 revealing  of SFA bilaterally s/p DCBA and stenting of R SFA  - Vascular Cardiology consult appreciated  - patient with known PAD with CLI [place] Classification 6 with bilateral  of the SFAs,   - s/p DCBA and stent to the distal R SFA 4/7/23   - now s/p stent to L SFA on 4/10  - c/w DAPT, cilostazol

## 2023-04-12 ENCOUNTER — RESULT REVIEW (OUTPATIENT)
Age: 73
End: 2023-04-12

## 2023-04-12 ENCOUNTER — TRANSCRIPTION ENCOUNTER (OUTPATIENT)
Age: 73
End: 2023-04-12

## 2023-04-12 LAB
ANION GAP SERPL CALC-SCNC: 11 MMOL/L — SIGNIFICANT CHANGE UP (ref 5–17)
APTT BLD: 28.9 SEC — SIGNIFICANT CHANGE UP (ref 27.5–35.5)
BLD GP AB SCN SERPL QL: NEGATIVE — SIGNIFICANT CHANGE UP
BUN SERPL-MCNC: 50 MG/DL — HIGH (ref 7–23)
CALCIUM SERPL-MCNC: 9.1 MG/DL — SIGNIFICANT CHANGE UP (ref 8.4–10.5)
CHLORIDE SERPL-SCNC: 109 MMOL/L — HIGH (ref 96–108)
CO2 SERPL-SCNC: 22 MMOL/L — SIGNIFICANT CHANGE UP (ref 22–31)
CREAT SERPL-MCNC: 2.24 MG/DL — HIGH (ref 0.5–1.3)
EGFR: 31 ML/MIN/1.73M2 — LOW
GLUCOSE BLDC GLUCOMTR-MCNC: 160 MG/DL — HIGH (ref 70–99)
GLUCOSE BLDC GLUCOMTR-MCNC: 162 MG/DL — HIGH (ref 70–99)
GLUCOSE BLDC GLUCOMTR-MCNC: 177 MG/DL — HIGH (ref 70–99)
GLUCOSE BLDC GLUCOMTR-MCNC: 260 MG/DL — HIGH (ref 70–99)
GLUCOSE SERPL-MCNC: 166 MG/DL — HIGH (ref 70–99)
GRAM STN FLD: SIGNIFICANT CHANGE UP
GRAM STN FLD: SIGNIFICANT CHANGE UP
HCT VFR BLD CALC: 25.1 % — LOW (ref 39–50)
HGB BLD-MCNC: 8.7 G/DL — LOW (ref 13–17)
INR BLD: 1.33 RATIO — HIGH (ref 0.88–1.16)
MAGNESIUM SERPL-MCNC: 1.7 MG/DL — SIGNIFICANT CHANGE UP (ref 1.6–2.6)
MCHC RBC-ENTMCNC: 26.7 PG — LOW (ref 27–34)
MCHC RBC-ENTMCNC: 34.7 GM/DL — SIGNIFICANT CHANGE UP (ref 32–36)
MCV RBC AUTO: 77 FL — LOW (ref 80–100)
NRBC # BLD: 0 /100 WBCS — SIGNIFICANT CHANGE UP (ref 0–0)
PHOSPHATE SERPL-MCNC: 3.5 MG/DL — SIGNIFICANT CHANGE UP (ref 2.5–4.5)
PLATELET # BLD AUTO: 267 K/UL — SIGNIFICANT CHANGE UP (ref 150–400)
POTASSIUM SERPL-MCNC: 4.2 MMOL/L — SIGNIFICANT CHANGE UP (ref 3.5–5.3)
POTASSIUM SERPL-SCNC: 4.2 MMOL/L — SIGNIFICANT CHANGE UP (ref 3.5–5.3)
PROTHROM AB SERPL-ACNC: 15.5 SEC — HIGH (ref 10.5–13.4)
RBC # BLD: 3.26 M/UL — LOW (ref 4.2–5.8)
RBC # FLD: 17.2 % — HIGH (ref 10.3–14.5)
RH IG SCN BLD-IMP: POSITIVE — SIGNIFICANT CHANGE UP
SODIUM SERPL-SCNC: 142 MMOL/L — SIGNIFICANT CHANGE UP (ref 135–145)
SPECIMEN SOURCE: SIGNIFICANT CHANGE UP
SPECIMEN SOURCE: SIGNIFICANT CHANGE UP
WBC # BLD: 12.49 K/UL — HIGH (ref 3.8–10.5)
WBC # FLD AUTO: 12.49 K/UL — HIGH (ref 3.8–10.5)

## 2023-04-12 PROCEDURE — 99232 SBSQ HOSP IP/OBS MODERATE 35: CPT

## 2023-04-12 PROCEDURE — 88311 DECALCIFY TISSUE: CPT | Mod: 26

## 2023-04-12 PROCEDURE — 73630 X-RAY EXAM OF FOOT: CPT | Mod: 26,50

## 2023-04-12 PROCEDURE — 88305 TISSUE EXAM BY PATHOLOGIST: CPT | Mod: 26

## 2023-04-12 RX ORDER — PANTOPRAZOLE SODIUM 20 MG/1
40 TABLET, DELAYED RELEASE ORAL
Refills: 0 | Status: DISCONTINUED | OUTPATIENT
Start: 2023-04-12 | End: 2023-04-18

## 2023-04-12 RX ORDER — ATORVASTATIN CALCIUM 80 MG/1
40 TABLET, FILM COATED ORAL AT BEDTIME
Refills: 0 | Status: DISCONTINUED | OUTPATIENT
Start: 2023-04-12 | End: 2023-04-18

## 2023-04-12 RX ORDER — DEXTROSE 50 % IN WATER 50 %
12.5 SYRINGE (ML) INTRAVENOUS ONCE
Refills: 0 | Status: DISCONTINUED | OUTPATIENT
Start: 2023-04-12 | End: 2023-04-18

## 2023-04-12 RX ORDER — CLOPIDOGREL BISULFATE 75 MG/1
1 TABLET, FILM COATED ORAL DAILY
Refills: 0 | Status: DISCONTINUED | OUTPATIENT
Start: 2023-04-12 | End: 2023-04-12

## 2023-04-12 RX ORDER — OXYCODONE AND ACETAMINOPHEN 5; 325 MG/1; MG/1
1 TABLET ORAL EVERY 4 HOURS
Refills: 0 | Status: DISCONTINUED | OUTPATIENT
Start: 2023-04-12 | End: 2023-04-12

## 2023-04-12 RX ORDER — INSULIN GLARGINE 100 [IU]/ML
12 INJECTION, SOLUTION SUBCUTANEOUS AT BEDTIME
Refills: 0 | Status: DISCONTINUED | OUTPATIENT
Start: 2023-04-12 | End: 2023-04-13

## 2023-04-12 RX ORDER — AZTREONAM 2 G
500 VIAL (EA) INJECTION EVERY 12 HOURS
Refills: 0 | Status: DISCONTINUED | OUTPATIENT
Start: 2023-04-12 | End: 2023-04-12

## 2023-04-12 RX ORDER — CHLORHEXIDINE GLUCONATE 213 G/1000ML
1 SOLUTION TOPICAL DAILY
Refills: 0 | Status: DISCONTINUED | OUTPATIENT
Start: 2023-04-12 | End: 2023-04-18

## 2023-04-12 RX ORDER — SENNA PLUS 8.6 MG/1
2 TABLET ORAL AT BEDTIME
Refills: 0 | Status: DISCONTINUED | OUTPATIENT
Start: 2023-04-12 | End: 2023-04-18

## 2023-04-12 RX ORDER — SODIUM CHLORIDE 9 MG/ML
1000 INJECTION, SOLUTION INTRAVENOUS
Refills: 0 | Status: DISCONTINUED | OUTPATIENT
Start: 2023-04-12 | End: 2023-04-12

## 2023-04-12 RX ORDER — GLUCAGON INJECTION, SOLUTION 0.5 MG/.1ML
1 INJECTION, SOLUTION SUBCUTANEOUS ONCE
Refills: 0 | Status: DISCONTINUED | OUTPATIENT
Start: 2023-04-12 | End: 2023-04-18

## 2023-04-12 RX ORDER — MAGNESIUM SULFATE 500 MG/ML
2 VIAL (ML) INJECTION ONCE
Refills: 0 | Status: COMPLETED | OUTPATIENT
Start: 2023-04-12 | End: 2023-04-12

## 2023-04-12 RX ORDER — FENTANYL CITRATE 50 UG/ML
25 INJECTION INTRAVENOUS
Refills: 0 | Status: DISCONTINUED | OUTPATIENT
Start: 2023-04-12 | End: 2023-04-12

## 2023-04-12 RX ORDER — CILOSTAZOL 100 MG/1
100 TABLET ORAL
Refills: 0 | Status: DISCONTINUED | OUTPATIENT
Start: 2023-04-12 | End: 2023-04-18

## 2023-04-12 RX ORDER — ACETAMINOPHEN 500 MG
650 TABLET ORAL EVERY 6 HOURS
Refills: 0 | Status: DISCONTINUED | OUTPATIENT
Start: 2023-04-12 | End: 2023-04-12

## 2023-04-12 RX ORDER — INSULIN LISPRO 100/ML
VIAL (ML) SUBCUTANEOUS AT BEDTIME
Refills: 0 | Status: DISCONTINUED | OUTPATIENT
Start: 2023-04-12 | End: 2023-04-18

## 2023-04-12 RX ORDER — INSULIN LISPRO 100/ML
VIAL (ML) SUBCUTANEOUS
Refills: 0 | Status: DISCONTINUED | OUTPATIENT
Start: 2023-04-12 | End: 2023-04-18

## 2023-04-12 RX ORDER — INSULIN GLARGINE 100 [IU]/ML
6 INJECTION, SOLUTION SUBCUTANEOUS AT BEDTIME
Refills: 0 | Status: DISCONTINUED | OUTPATIENT
Start: 2023-04-12 | End: 2023-04-12

## 2023-04-12 RX ORDER — DEXTROSE 50 % IN WATER 50 %
25 SYRINGE (ML) INTRAVENOUS ONCE
Refills: 0 | Status: DISCONTINUED | OUTPATIENT
Start: 2023-04-12 | End: 2023-04-18

## 2023-04-12 RX ORDER — INSULIN LISPRO 100/ML
5 VIAL (ML) SUBCUTANEOUS
Refills: 0 | Status: DISCONTINUED | OUTPATIENT
Start: 2023-04-12 | End: 2023-04-13

## 2023-04-12 RX ORDER — ISOSORBIDE MONONITRATE 60 MG/1
30 TABLET, EXTENDED RELEASE ORAL DAILY
Refills: 0 | Status: DISCONTINUED | OUTPATIENT
Start: 2023-04-12 | End: 2023-04-18

## 2023-04-12 RX ORDER — DEXTROSE 50 % IN WATER 50 %
15 SYRINGE (ML) INTRAVENOUS ONCE
Refills: 0 | Status: DISCONTINUED | OUTPATIENT
Start: 2023-04-12 | End: 2023-04-18

## 2023-04-12 RX ORDER — NIFEDIPINE 30 MG
60 TABLET, EXTENDED RELEASE 24 HR ORAL DAILY
Refills: 0 | Status: DISCONTINUED | OUTPATIENT
Start: 2023-04-13 | End: 2023-04-18

## 2023-04-12 RX ORDER — ASPIRIN/CALCIUM CARB/MAGNESIUM 324 MG
81 TABLET ORAL DAILY
Refills: 0 | Status: DISCONTINUED | OUTPATIENT
Start: 2023-04-12 | End: 2023-04-18

## 2023-04-12 RX ORDER — SODIUM CHLORIDE 9 MG/ML
1000 INJECTION, SOLUTION INTRAVENOUS
Refills: 0 | Status: DISCONTINUED | OUTPATIENT
Start: 2023-04-12 | End: 2023-04-18

## 2023-04-12 RX ORDER — METOPROLOL TARTRATE 50 MG
50 TABLET ORAL DAILY
Refills: 0 | Status: DISCONTINUED | OUTPATIENT
Start: 2023-04-12 | End: 2023-04-18

## 2023-04-12 RX ORDER — ACETAMINOPHEN WITH CODEINE 300MG-30MG
1 TABLET ORAL EVERY 4 HOURS
Refills: 0 | Status: DISCONTINUED | OUTPATIENT
Start: 2023-04-12 | End: 2023-04-18

## 2023-04-12 RX ORDER — CLOPIDOGREL BISULFATE 75 MG/1
75 TABLET, FILM COATED ORAL DAILY
Refills: 0 | Status: DISCONTINUED | OUTPATIENT
Start: 2023-04-12 | End: 2023-04-18

## 2023-04-12 RX ADMIN — Medication 81 MILLIGRAM(S): at 12:52

## 2023-04-12 RX ADMIN — CILOSTAZOL 100 MILLIGRAM(S): 100 TABLET ORAL at 18:31

## 2023-04-12 RX ADMIN — Medication 50 MILLIGRAM(S): at 05:52

## 2023-04-12 RX ADMIN — SENNA PLUS 2 TABLET(S): 8.6 TABLET ORAL at 22:20

## 2023-04-12 RX ADMIN — CILOSTAZOL 100 MILLIGRAM(S): 100 TABLET ORAL at 05:51

## 2023-04-12 RX ADMIN — PANTOPRAZOLE SODIUM 40 MILLIGRAM(S): 20 TABLET, DELAYED RELEASE ORAL at 06:55

## 2023-04-12 RX ADMIN — Medication 25 GRAM(S): at 12:52

## 2023-04-12 RX ADMIN — HEPARIN SODIUM 5000 UNIT(S): 5000 INJECTION INTRAVENOUS; SUBCUTANEOUS at 05:52

## 2023-04-12 RX ADMIN — Medication 2: at 11:41

## 2023-04-12 RX ADMIN — CLOPIDOGREL BISULFATE 75 MILLIGRAM(S): 75 TABLET, FILM COATED ORAL at 12:52

## 2023-04-12 RX ADMIN — CHLORHEXIDINE GLUCONATE 1 APPLICATION(S): 213 SOLUTION TOPICAL at 14:12

## 2023-04-12 RX ADMIN — Medication 5 UNIT(S): at 11:41

## 2023-04-12 RX ADMIN — ISOSORBIDE MONONITRATE 30 MILLIGRAM(S): 60 TABLET, EXTENDED RELEASE ORAL at 12:53

## 2023-04-12 RX ADMIN — ATORVASTATIN CALCIUM 40 MILLIGRAM(S): 80 TABLET, FILM COATED ORAL at 22:20

## 2023-04-12 RX ADMIN — Medication 5 UNIT(S): at 16:41

## 2023-04-12 RX ADMIN — Medication 60 MILLIGRAM(S): at 05:53

## 2023-04-12 RX ADMIN — Medication 50 MILLIGRAM(S): at 05:51

## 2023-04-12 RX ADMIN — Medication 6: at 16:41

## 2023-04-12 RX ADMIN — INSULIN GLARGINE 12 UNIT(S): 100 INJECTION, SOLUTION SUBCUTANEOUS at 22:20

## 2023-04-12 NOTE — PHYSICAL THERAPY INITIAL EVALUATION ADULT - ADDITIONAL COMMENTS
as per wife at bedside: PTA pt was living in an elevator apartment with wife. ambulatory with cane and no AD, however wife states can really only ambulate approx 100 ft baseline.

## 2023-04-12 NOTE — PROGRESS NOTE ADULT - PROBLEM SELECTOR PLAN 6
BP elevated  - c/w Toprol XL 50mg daily  - c/w nifedipine XL to 60mg daily  - c/w imdur ER 30mg daily  - monitor vitals

## 2023-04-12 NOTE — PROGRESS NOTE ADULT - ATTENDING COMMENTS
CTLI, [place] Classification 6, with bilateral  of the SFAs, s/p DCBA and stent to the distal RSFA 4/7/23, s/p LSFA stent and DCBA 4/10/23  Now s/p bilateral metatarsal amputation.      Continue Plavix and ASA 81 mg. GDMT. Appreciate podiatry and ID input.

## 2023-04-12 NOTE — PROGRESS NOTE ADULT - SUBJECTIVE AND OBJECTIVE BOX
Patient is a 72y old  Male who presents with a chief complaint of Transferred from [facility] for possible vascular intervention. (11 Apr 2023 13:36)      INTERVAL HPI/OVERNIGHT EVENTS:   Pt is scheduled for bilateral partial 1st and 2nd ray resections with Dr. Hernandez at 730am. Patient is aware of procedure and is NPO since midnight.    MEDICATIONS  (STANDING):  aspirin enteric coated 81 milliGRAM(s) Oral daily  atorvastatin 40 milliGRAM(s) Oral at bedtime  aztreonam  IVPB      aztreonam  IVPB 500 milliGRAM(s) IV Intermittent every 12 hours  chlorhexidine 2% Cloths 1 Application(s) Topical daily  cilostazol 100 milliGRAM(s) Oral two times a day  clopidogrel Tablet 75 milliGRAM(s) Oral daily  dextrose 5%. 1000 milliLiter(s) (50 mL/Hr) IV Continuous <Continuous>  dextrose 5%. 1000 milliLiter(s) (100 mL/Hr) IV Continuous <Continuous>  dextrose 50% Injectable 25 Gram(s) IV Push once  dextrose 50% Injectable 12.5 Gram(s) IV Push once  dextrose 50% Injectable 25 Gram(s) IV Push once  glucagon  Injectable 1 milliGRAM(s) IntraMuscular once  heparin   Injectable 5000 Unit(s) SubCutaneous every 8 hours  insulin glargine Injectable (LANTUS) 6 Unit(s) SubCutaneous at bedtime  insulin lispro (ADMELOG) corrective regimen sliding scale   SubCutaneous three times a day before meals  insulin lispro (ADMELOG) corrective regimen sliding scale   SubCutaneous at bedtime  insulin lispro Injectable (ADMELOG) 5 Unit(s) SubCutaneous three times a day before meals  isosorbide   mononitrate ER Tablet (IMDUR) 30 milliGRAM(s) Oral daily  metoprolol succinate ER 50 milliGRAM(s) Oral daily  pantoprazole    Tablet 40 milliGRAM(s) Oral before breakfast  senna 2 Tablet(s) Oral at bedtime  sodium chloride 0.9%. 1000 milliLiter(s) (75 mL/Hr) IV Continuous <Continuous>    MEDICATIONS  (PRN):  acetaminophen     Tablet .. 650 milliGRAM(s) Oral every 6 hours PRN Temp greater or equal to 38C (100.4F), Mild Pain (1 - 3)  dextrose Oral Gel 15 Gram(s) Oral once PRN Blood Glucose LESS THAN 70 milliGRAM(s)/deciliter  oxycodone    5 mG/acetaminophen 325 mG 1 Tablet(s) Oral every 6 hours PRN Severe Pain (7 - 10)      Allergies    penicillin (Unknown)    Intolerances        Vital Signs Last 24 Hrs  T(C): 36.7 (12 Apr 2023 04:39), Max: 37.2 (11 Apr 2023 10:51)  T(F): 98.1 (12 Apr 2023 04:39), Max: 98.9 (11 Apr 2023 10:51)  HR: 118 (12 Apr 2023 04:39) (87 - 118)  BP: 167/90 (12 Apr 2023 04:39) (142/91 - 167/90)  BP(mean): 125 (11 Apr 2023 19:31) (125 - 125)  RR: 18 (12 Apr 2023 04:39) (18 - 18)  SpO2: 98% (12 Apr 2023 04:39) (98% - 100%)    Parameters below as of 12 Apr 2023 04:39  Patient On (Oxygen Delivery Method): room air        LABS:                        8.7    12.49 )-----------( 267      ( 12 Apr 2023 06:21 )             25.1     04-12    142  |  109<H>  |  50<H>  ----------------------------<  166<H>  4.2   |  22  |  2.24<H>    Ca    9.1      12 Apr 2023 06:21  Phos  3.5     04-12  Mg     1.7     04-12      PT/INR - ( 12 Apr 2023 06:21 )   PT: 15.5 sec;   INR: 1.33 ratio         PTT - ( 12 Apr 2023 06:21 )  PTT:28.9 sec    CAPILLARY BLOOD GLUCOSE      POCT Blood Glucose.: 123 mg/dL (11 Apr 2023 21:25)  POCT Blood Glucose.: 273 mg/dL (11 Apr 2023 17:53)  POCT Blood Glucose.: 338 mg/dL (11 Apr 2023 11:38)  POCT Blood Glucose.: 231 mg/dL (11 Apr 2023 07:49)      RADIOLOGY & ADDITIONAL TESTS:    Plan:   To OR today at 730am with Dr. Hernandez for b/l partial 1st and 2nd ray resections.   CXR on sunrise.  EKG on sunrise.  Medical/Cardiac clearance since 4/10 and documented in chart.  Consent signed and in chart.  Procedure was explained to patient in detail. All alternatives, risks and complications were discussed. All questions answered.

## 2023-04-12 NOTE — PROGRESS NOTE ADULT - SUBJECTIVE AND OBJECTIVE BOX
Samara Sung MD  Division of Hospital Medicine  [facility]   Available on Microsoft Teams (Mon-Fri 8am-5pm)    * messages preferred prior to calls  Other Times:  [phone]      Patient is a 72y old  Male who presents with a chief complaint of Transferred from [facility] for possible vascular intervention. (12 Apr 2023 13:53)      SUBJECTIVE / OVERNIGHT EVENTS: no acute events overnight. no fever, chills, chest pain, nor dyspnea. had some pain post-op but now improved. asking when he can go home  ADDITIONAL REVIEW OF SYSTEMS:    Tele reviewed: SR with -120s    MEDICATIONS  (STANDING):  aspirin enteric coated 81 milliGRAM(s) Oral daily  atorvastatin 40 milliGRAM(s) Oral at bedtime  chlorhexidine 2% Cloths 1 Application(s) Topical daily  cilostazol 100 milliGRAM(s) Oral two times a day  clopidogrel Tablet 75 milliGRAM(s) Oral daily  dextrose 5%. 1000 milliLiter(s) (50 mL/Hr) IV Continuous <Continuous>  dextrose 5%. 1000 milliLiter(s) (100 mL/Hr) IV Continuous <Continuous>  dextrose 50% Injectable 12.5 Gram(s) IV Push once  dextrose 50% Injectable 25 Gram(s) IV Push once  glucagon  Injectable 1 milliGRAM(s) IntraMuscular once  insulin glargine Injectable (LANTUS) 12 Unit(s) SubCutaneous at bedtime  insulin lispro (ADMELOG) corrective regimen sliding scale   SubCutaneous three times a day before meals  insulin lispro (ADMELOG) corrective regimen sliding scale   SubCutaneous at bedtime  insulin lispro Injectable (ADMELOG) 5 Unit(s) SubCutaneous three times a day before meals  isosorbide   mononitrate ER Tablet (IMDUR) 30 milliGRAM(s) Oral daily  metoprolol succinate ER 50 milliGRAM(s) Oral daily  pantoprazole    Tablet 40 milliGRAM(s) Oral before breakfast  senna 2 Tablet(s) Oral at bedtime    MEDICATIONS  (PRN):  acetaminophen  300 mG/codeine 30 mG 1 Tablet(s) Oral every 4 hours PRN Mild Pain (1 - 3)  dextrose Oral Gel 15 Gram(s) Oral once PRN Blood Glucose LESS THAN 70 milliGRAM(s)/deciliter  oxycodone    5 mG/acetaminophen 325 mG 1 Tablet(s) Oral every 6 hours PRN Severe Pain (7 - 10)      CAPILLARY BLOOD GLUCOSE      POCT Blood Glucose.: 177 mg/dL (12 Apr 2023 11:33)  POCT Blood Glucose.: 162 mg/dL (12 Apr 2023 09:17)  POCT Blood Glucose.: 123 mg/dL (11 Apr 2023 21:25)  POCT Blood Glucose.: 273 mg/dL (11 Apr 2023 17:53)    I&O's Summary    11 Apr 2023 07:01  -  12 Apr 2023 07:00  --------------------------------------------------------  IN: 840 mL / OUT: 400 mL / NET: 440 mL        PHYSICAL EXAM:  Vital Signs Last 24 Hrs  T(C): 36.8 (12 Apr 2023 10:55), Max: 37.2 (11 Apr 2023 19:31)  T(F): 98.2 (12 Apr 2023 10:55), Max: 98.8 (11 Apr 2023 20:56)  HR: 94 (12 Apr 2023 10:55) (87 - 118)  BP: 166/80 (12 Apr 2023 10:55) (142/91 - 167/90)  BP(mean): 112 (12 Apr 2023 10:15) (99 - 125)  RR: 18 (12 Apr 2023 10:55) (13 - 18)  SpO2: 100% (12 Apr 2023 10:55) (91% - 100%)    Parameters below as of 12 Apr 2023 10:55  Patient On (Oxygen Delivery Method): room air    CONSTITUTIONAL: NAD, well-developed, well-groomed  EYES: PERRLA; conjunctiva and sclera clear  ENMT: Moist oral mucosa, no pharyngeal injection or exudates; normal dentition  NECK: Supple, no palpable masses; no thyromegaly  RESPIRATORY: Normal respiratory effort; lungs are clear to auscultation bilaterally  CARDIOVASCULAR: Regular rate and rhythm, normal S1 and S2, no murmur/rub/gallop; No lower extremity edema  ABDOMEN: Soft, Nondistended, Nontender to palpation, normoactive bowel sounds  MUSCULOSKELETAL: No clubbing or cyanosis of digits; b/l feet in post-op dressing c/d/i  PSYCH: A+O to person, place, and time; affect appropriate  NEUROLOGY: CN 2-12 are intact and symmetric; no gross sensory deficits   SKIN: No rashes; no palpable lesions, b/l venous stasis changes, b/l groin sites c/d/i     LABS:                        8.7    12.49 )-----------( 267      ( 12 Apr 2023 06:21 )             25.1     04-12    142  |  109<H>  |  50<H>  ----------------------------<  166<H>  4.2   |  22  |  2.24<H>    Ca    9.1      12 Apr 2023 06:21  Phos  3.5     04-12  Mg     1.7     04-12      PT/INR - ( 12 Apr 2023 06:21 )   PT: 15.5 sec;   INR: 1.33 ratio         PTT - ( 12 Apr 2023 06:21 )  PTT:28.9 sec        RADIOLOGY & ADDITIONAL TESTS:  Results Reviewed: no leukocytosis, H/H stable, hypomagnesemia repleted, Cr stable  Imaging Personally Reviewed:  Electrocardiogram Personally Reviewed:    COORDINATION OF CARE:  Care Discussed with Consultants/Other Providers [Y]: ID Attending Dr. No, medicine NP Velia  Prior or Outpatient Records Reviewed [Y/N]:

## 2023-04-12 NOTE — PROGRESS NOTE ADULT - ASSESSMENT
73 yo male with PMH of CKD4, PAD s/p stent, CAD, HTN, T2DM, current smoker who p/w LE dry gangrene and osteomyelitis now s/p R SFA stent on 4/7 and L SFA stent on 4/10 now s/p b/l partial 1st and 2nd ray resection on 4/12/23.

## 2023-04-12 NOTE — PROGRESS NOTE ADULT - PROBLEM SELECTOR PLAN 2
Pt with stay at OSH on iv abx seen by pod and ID there.   - wound cx grew Citrobacter, staph simulans & helcococcus yassine, and poryphyromonas  - now s/p bilateral partial first and second ray resection, closed, EBL 30cc, patient bled moderately on left but inadequately on right, high concern for viability, low concern for residual infection as bone quality at level of resection was hard and adequate with Podiatry on 4/12  - f/u clean bone blood cx obtained in OR 4/12  - ID re-consulted re: need for abx on d/c, recs appreciated  - discussed with ID Attending Dr. No, can monitor off abx given chronic dry gangrene  - IV aztreonam discontinued on 4/12

## 2023-04-12 NOTE — PROGRESS NOTE ADULT - ATTENDING COMMENTS
agree with assessment and plan above, discussed with patient risk for non healing due to PVD. Patient expressed understanding and informed consent was obtained from patient.

## 2023-04-12 NOTE — PROGRESS NOTE ADULT - PROBLEM SELECTOR PLAN 1
-on last ID visit, appeared dry  -pt nontoxic and no active s/s of infection  -hold off abx  -now s/p bilateral first and second toes ray resection with Podiatry today 4/11  -care per vascular/podiatry teams

## 2023-04-12 NOTE — PHYSICAL THERAPY INITIAL EVALUATION ADULT - IMPAIRMENTS FOUND, PT EVAL
aerobic capacity/endurance/arousal, attention, and cognition/joint integrity and mobility/muscle strength/ROM

## 2023-04-12 NOTE — BRIEF OPERATIVE NOTE - SPECIMENS
path  - right foot clean bone margin, left foot clean bone margin, right foot first and second toe, left foot first and second toe; micro - right foot deep wound culture, right foot clean bone margin, left foot deep wound culture, left foot clean bone margin

## 2023-04-12 NOTE — PROGRESS NOTE ADULT - ASSESSMENT
Assessment:  1. CTLI with CLI [facility] Classification 6 with bilateral  of the SFAs, s/p DCBA and stent to the distal RSFA 4/7/23, s/p LSFA stent and DCBA 4/10/23  2. Bilateral metatarsal amputation   3. CAD s/p prior PCI  4. HTN  5. HLD  6. DM  7. CKD stage 4  8. Current Smoker      Plan:  1. Successful percutaneous revascularization of bilateral SFA's    2. Continue Plavix and ASA 81 mg. Cont Cilostazol.   3. Statin therapy        Please call with any questions:

## 2023-04-12 NOTE — PROGRESS NOTE ADULT - ASSESSMENT
73 y/o M--patient with a history of type 2 DM complicated with CKD4, PAD with past stenting RLE, essential HTN, apparently still active cigarette use until one week ago, UNVACCINATED against COVID-19, with initial admission to [facility] 3/30/23 in the setting of patient with mild cognitive impairment, with spouse reporting the patient had lost his RIGHT hallux toenail about a week prior to that admission and noted dark discoloration of the RIGHT hallux and 2nd toe with foul odor emanating from the RIGHT foot+ left foot gangrene    Patient s/p bilateral partial first and second ray resection today 4/12. Per brief op note, high concern for viability, low concern for residual infection. Patient otherwise feels well. No fevers. Remains with elevated but stable wbc.     Alejandro No MD  Available through MS Teams  If no response, or after 5pm/weekends, call [phone]

## 2023-04-12 NOTE — PHYSICAL THERAPY INITIAL EVALUATION ADULT - PERTINENT HX OF CURRENT PROBLEM, REHAB EVAL
71 y/o M with a history of DM2, HTN, CKD4, PAD s/p RLE stent, CAD with stenting on AC, cigarette use until 1 week ago. Patient initially presented to [facility] 3/30/23 c/o mild cognitive impairment and R 2nd toe dry gangrene and L hallux OM. Transferred to [facility] for possible vascular cardiology intervention.    Dx:  Chronic Osteomyelitis - R 2nd toe dry gangrene and L hallux OM - s/p Azactam & IV Vancomycin,  4/12  s/p bilateral partial first and second ray resection today 4/12. 71 y/o M with a history of DM2, HTN, CKD4, PAD s/p RLE stent, CAD with stenting on AC, cigarette use until 1 week ago. Patient initially presented to [facility] 3/30/23 c/o mild cognitive impairment and R 2nd toe dry gangrene and L hallux OM. Transferred to [facility] for possible vascular cardiology intervention. Dx:  Chronic Osteomyelitis - R 2nd toe dry gangrene and L hallux OM - s/p Azactam & IV Vancomycin,  4/12  s/p bilateral partial first and second ray resection today 4/12.

## 2023-04-12 NOTE — PROGRESS NOTE ADULT - SUBJECTIVE AND OBJECTIVE BOX
CC: Patient is a 72y old  Male who presents with a chief complaint of Transferred from [facility] for possible vascular intervention. (12 Apr 2023 15:18)    ID following for foot gangrene    Interval History/ROS: Patient s/p bilateral partial first and second ray resection today 4/12. Per brief op note, high concern for viability, low concern for residual infection. Patient otherwise feels well. No fevers. Remains with elevated but stable wbc.     Rest of ROS negative.    Allergies  penicillin (Unknown)    ANTIMICROBIALS:      OTHER MEDS:  acetaminophen  300 mG/codeine 30 mG 1 Tablet(s) Oral every 4 hours PRN  aspirin enteric coated 81 milliGRAM(s) Oral daily  atorvastatin 40 milliGRAM(s) Oral at bedtime  chlorhexidine 2% Cloths 1 Application(s) Topical daily  cilostazol 100 milliGRAM(s) Oral two times a day  clopidogrel Tablet 75 milliGRAM(s) Oral daily  dextrose 5%. 1000 milliLiter(s) IV Continuous <Continuous>  dextrose 5%. 1000 milliLiter(s) IV Continuous <Continuous>  dextrose 50% Injectable 12.5 Gram(s) IV Push once  dextrose 50% Injectable 25 Gram(s) IV Push once  dextrose Oral Gel 15 Gram(s) Oral once PRN  glucagon  Injectable 1 milliGRAM(s) IntraMuscular once  insulin glargine Injectable (LANTUS) 12 Unit(s) SubCutaneous at bedtime  insulin lispro (ADMELOG) corrective regimen sliding scale   SubCutaneous three times a day before meals  insulin lispro (ADMELOG) corrective regimen sliding scale   SubCutaneous at bedtime  insulin lispro Injectable (ADMELOG) 5 Unit(s) SubCutaneous three times a day before meals  isosorbide   mononitrate ER Tablet (IMDUR) 30 milliGRAM(s) Oral daily  metoprolol succinate ER 50 milliGRAM(s) Oral daily  oxycodone    5 mG/acetaminophen 325 mG 1 Tablet(s) Oral every 6 hours PRN  pantoprazole    Tablet 40 milliGRAM(s) Oral before breakfast  senna 2 Tablet(s) Oral at bedtime    PE:    Vital Signs Last 24 Hrs  T(C): 36.8 (12 Apr 2023 10:55), Max: 37.2 (11 Apr 2023 19:31)  T(F): 98.2 (12 Apr 2023 10:55), Max: 98.8 (11 Apr 2023 20:56)  HR: 94 (12 Apr 2023 10:55) (87 - 118)  BP: 166/80 (12 Apr 2023 10:55) (142/91 - 167/90)  BP(mean): 112 (12 Apr 2023 10:15) (99 - 125)  RR: 18 (12 Apr 2023 10:55) (13 - 18)  SpO2: 100% (12 Apr 2023 10:55) (91% - 100%)    Parameters below as of 12 Apr 2023 10:55  Patient On (Oxygen Delivery Method): room air    Gen: AOx3, NAD  CV: S1+S2 normal, no murmurs  Resp: Clear bilat, no resp distress  Abd: Soft, nontender, +BS  Ext: No LE edema, no wounds  : No Oliveira  IV/Skin: No thrombophlebitis, bilateral feet bandaged, pt returned from OR, currently does not want examination of his feet  Neuro: no focal deficits    LABS:                          8.7    12.49 )-----------( 267      ( 12 Apr 2023 06:21 )             25.1       04-12    142  |  109<H>  |  50<H>  ----------------------------<  166<H>  4.2   |  22  |  2.24<H>    Ca    9.1      12 Apr 2023 06:21  Phos  3.5     04-12  Mg     1.7     04-12            MICROBIOLOGY:  v  .Abscess Left foot  04-04-23   Few Citrobacter freundii complex  Rare Staphylococcus simulans "Susceptibilities not performed"  Few Helcococcus kunzii "Susceptibilities not performed"  Rare Porphyromonas somerae "Susceptibilities not performed"  --  Citrobacter freundii complex    RADIOLOGY:    < from: Xray Foot AP + Lateral + Oblique, Bilat (04.12.23 @ 09:49) >  IMPRESSION:  Status post bilateral partial 1st and 2nd ray resections through distal   1st metatarsal shafts and portion of 2nd metatarsal heads including   removed hallux sesamoid bones.    Sharp smooth slightly beveled osseous stump margins with postsurgical   changes in the overlying soft tissues. No proximally tracking gas   collections beyond the amputation margins and no focal areas of   osteomyelitis.    Remainder of both feet unchanged.    Also correlate with intraoperative findings.    < end of copied text >

## 2023-04-12 NOTE — PROGRESS NOTE ADULT - PROBLEM SELECTOR PLAN 1
VA arterial duplex showing mod severe disease.   - s/p peripheral angio 4/7/2023 revealing  of SFA bilaterally s/p DCBA and stenting of R SFA  - Vascular Cardiology consult appreciated  - patient with known PAD with CLI [place] Classification 6 with bilateral  of the SFAs,   - s/p DCBA and stent to the distal R SFA 4/7/23   - now s/p stent to L SFA on 4/10  - c/w DAPT, cilostazol

## 2023-04-12 NOTE — PHYSICAL THERAPY INITIAL EVALUATION ADULT - NSPTDISCHREC_GEN_A_CORE
TBD upon functional eval Subacute Rehab; if home, pt would require assist with ALL ADL's and functional mobility and home PT. Pt would require assistance to get into home. Pt would require wheelchair as primary means of mobility due to inability to maintain weightbearing status./Sub-acute Rehab

## 2023-04-12 NOTE — PROGRESS NOTE ADULT - SUBJECTIVE AND OBJECTIVE BOX
INTERVAL HISTORY: S/P bilateral metatarsal amputation             Allergies  penicillin (Unknown)      	    MEDICATIONS:  aspirin enteric coated 81 milliGRAM(s) Oral daily  cilostazol 100 milliGRAM(s) Oral two times a day  clopidogrel Tablet 75 milliGRAM(s) Oral daily  isosorbide   mononitrate ER Tablet (IMDUR) 30 milliGRAM(s) Oral daily  metoprolol succinate ER 50 milliGRAM(s) Oral daily  aztreonam  IVPB 500 milliGRAM(s) IV Intermittent every 12 hours  acetaminophen  300 mG/codeine 30 mG 1 Tablet(s) Oral every 4 hours PRN  oxycodone    5 mG/acetaminophen 325 mG 1 Tablet(s) Oral every 6 hours PRN  pantoprazole    Tablet 40 milliGRAM(s) Oral before breakfast  senna 2 Tablet(s) Oral at bedtime  atorvastatin 40 milliGRAM(s) Oral at bedtime  dextrose 50% Injectable 12.5 Gram(s) IV Push once  dextrose 50% Injectable 25 Gram(s) IV Push once  dextrose Oral Gel 15 Gram(s) Oral once PRN  glucagon  Injectable 1 milliGRAM(s) IntraMuscular once  insulin glargine Injectable (LANTUS) 12 Unit(s) SubCutaneous at bedtime  insulin lispro (ADMELOG) corrective regimen sliding scale   SubCutaneous three times a day before meals  insulin lispro (ADMELOG) corrective regimen sliding scale   SubCutaneous at bedtime  insulin lispro Injectable (ADMELOG) 5 Unit(s) SubCutaneous three times a day before meals  chlorhexidine 2% Cloths 1 Application(s) Topical daily  dextrose 5%. 1000 milliLiter(s) IV Continuous <Continuous>  dextrose 5%. 1000 milliLiter(s) IV Continuous <Continuous>      PAST MEDICAL & SURGICAL HISTORY:  Essential hypertension  Hyperlipidemia, unspecified hyperlipidemia type  CAD (coronary artery disease)  PVD (peripheral vascular disease) with claudication  Stented coronary artery  Type 2 diabetes mellitus  Stage 4 chronic kidney disease  COVID-19 vaccination refused  Stented coronary artery  PAD (peripheral artery disease)      FAMILY HISTORY:  Family history of essential hypertension        SOCIAL HISTORY:  unchanged    REVIEW OF SYSTEMS:  CONSTITUTIONAL: No fever, weight loss, or fatigue  EYES: No eye pain, visual disturbances, or discharge  ENMT:  No difficulty hearing, tinnitus, vertigo; No sinus or throat pain  NECK: No pain or stiffness  RESPIRATORY: No cough, wheezing, chills or hemoptysis; No Shortness of Breath  CARDIOVASCULAR: No chest pain, palpitations, passing out, dizziness, or leg swelling  GASTROINTESTINAL: No abdominal or epigastric pain. No nausea, vomiting, or hematemesis; No diarrhea or constipation. No melena or hematochezia.  GENITOURINARY: No dysuria, frequency, hematuria, or incontinence  NEUROLOGICAL: No headaches, memory loss, loss of strength, numbness, or tremors  SKIN: No itching, burning, rashes, or lesions   LYMPH Nodes: No enlarged glands  ENDOCRINE: No heat or cold intolerance; No hair loss  MUSCULOSKELETAL: No joint pain or swelling; No muscle, back, or extremity pain  PSYCHIATRIC: No depression, anxiety, mood swings, or difficulty sleeping  HEME/LYMPH: No easy bruising, or bleeding gums  ALLERY AND IMMUNOLOGIC: No hives or eczema	    [ x] All others negative	  [ ] Unable to obtain    PHYSICAL EXAM:  T(C): 36.8 (04-12-23 @ 10:55), Max: 37.2 (04-11-23 @ 19:31)  HR: 94 (04-12-23 @ 10:55) (87 - 118)  BP: 166/80 (04-12-23 @ 10:55) (142/91 - 167/90)  RR: 18 (04-12-23 @ 10:55) (13 - 18)  SpO2: 100% (04-12-23 @ 10:55) (91% - 100%)  Wt(kg): --  I&O's Summary    11 Apr 2023 07:01  -  12 Apr 2023 07:00  --------------------------------------------------------  IN: 840 mL / OUT: 400 mL / NET: 440 mL        Appearance: Normal	  HEENT:   Normal oral mucosa, PERRL, EOMI	  Lymphatic: No lymphadenopathy  Cardiovascular: Normal S1 S2, No JVD, No murmurs, No edema  Respiratory: Lungs clear to auscultation	  Psychiatry: A & O x 3, Mood & affect appropriate  Gastrointestinal:  Soft, Non-tender, + BS	  Skin: No rashes, No ecchymoses, No cyanosis	  Neurologic: Non-focal  Vascular Pulse Exam: Audible bilateral DP and PT signals  Foot Exam:  Left foot distal lateral hallux wound, 2nd digit dry gangrene  Right foot 2nd and 3rd digit dry gangrene, dorsal medial midfoot dry gangrene      LABS:	 	    CBC Full  -  ( 12 Apr 2023 06:21 )  WBC Count : 12.49 K/uL  Hemoglobin : 8.7 g/dL  Hematocrit : 25.1 %  Platelet Count - Automated : 267 K/uL  Mean Cell Volume : 77.0 fl  Mean Cell Hemoglobin : 26.7 pg  Mean Cell Hemoglobin Concentration : 34.7 gm/dL  Auto Neutrophil # : x  Auto Lymphocyte # : x  Auto Monocyte # : x  Auto Eosinophil # : x  Auto Basophil # : x  Auto Neutrophil % : x  Auto Lymphocyte % : x  Auto Monocyte % : x  Auto Eosinophil % : x  Auto Basophil % : x    04-12    142  |  109<H>  |  50<H>  ----------------------------<  166<H>  4.2   |  22  |  2.24<H>  04-11    140  |  107  |  50<H>  ----------------------------<  213<H>  4.2   |  21<L>  |  2.24<H>    Ca    9.1      12 Apr 2023 06:21  Ca    9.2      11 Apr 2023 06:06  Phos  3.5     04-12  Mg     1.7     04-12

## 2023-04-12 NOTE — PROGRESS NOTE ADULT - PROBLEM SELECTOR PLAN 4
HbA1c 5.5%  - c/w sliding scale  - increase lantus to 12 units qHS given patient will be NPO after midnight       * post-op will need to increase his lantus to at least 12 units qHS. will see his FS tomorrow  - c/w pre-meal admelog to 5 units TID qAC, hold if NPO  - monitor FS qAC + qHS

## 2023-04-12 NOTE — BRIEF OPERATIVE NOTE - COMMENTS
Pt is s/p bilateral partial first and second ray resection, closed, EBL 30cc, patient bled moderately on left but inadequately on right, high concern for viability, low concern for residual infection as bone quality at level of resection was hard and adequate, discharge pending 48 hours of no growth on clean bone culture

## 2023-04-13 DIAGNOSIS — R50.9 FEVER, UNSPECIFIED: ICD-10-CM

## 2023-04-13 LAB
ANION GAP SERPL CALC-SCNC: 10 MMOL/L — SIGNIFICANT CHANGE UP (ref 5–17)
ANION GAP SERPL CALC-SCNC: 12 MMOL/L — SIGNIFICANT CHANGE UP (ref 5–17)
APPEARANCE UR: CLEAR — SIGNIFICANT CHANGE UP
BASOPHILS # BLD AUTO: 0.06 K/UL — SIGNIFICANT CHANGE UP (ref 0–0.2)
BASOPHILS NFR BLD AUTO: 0.3 % — SIGNIFICANT CHANGE UP (ref 0–2)
BILIRUB UR-MCNC: NEGATIVE — SIGNIFICANT CHANGE UP
BUN SERPL-MCNC: 48 MG/DL — HIGH (ref 7–23)
BUN SERPL-MCNC: 48 MG/DL — HIGH (ref 7–23)
CALCIUM SERPL-MCNC: 8.7 MG/DL — SIGNIFICANT CHANGE UP (ref 8.4–10.5)
CALCIUM SERPL-MCNC: 8.8 MG/DL — SIGNIFICANT CHANGE UP (ref 8.4–10.5)
CHLORIDE SERPL-SCNC: 104 MMOL/L — SIGNIFICANT CHANGE UP (ref 96–108)
CHLORIDE SERPL-SCNC: 105 MMOL/L — SIGNIFICANT CHANGE UP (ref 96–108)
CO2 SERPL-SCNC: 21 MMOL/L — LOW (ref 22–31)
CO2 SERPL-SCNC: 22 MMOL/L — SIGNIFICANT CHANGE UP (ref 22–31)
COLOR SPEC: SIGNIFICANT CHANGE UP
CREAT SERPL-MCNC: 2.5 MG/DL — HIGH (ref 0.5–1.3)
CREAT SERPL-MCNC: 2.54 MG/DL — HIGH (ref 0.5–1.3)
DIFF PNL FLD: NEGATIVE — SIGNIFICANT CHANGE UP
EGFR: 26 ML/MIN/1.73M2 — LOW
EGFR: 27 ML/MIN/1.73M2 — LOW
EOSINOPHIL # BLD AUTO: 0.01 K/UL — SIGNIFICANT CHANGE UP (ref 0–0.5)
EOSINOPHIL NFR BLD AUTO: 0.1 % — SIGNIFICANT CHANGE UP (ref 0–6)
GLUCOSE BLDC GLUCOMTR-MCNC: 159 MG/DL — HIGH (ref 70–99)
GLUCOSE BLDC GLUCOMTR-MCNC: 210 MG/DL — HIGH (ref 70–99)
GLUCOSE BLDC GLUCOMTR-MCNC: 230 MG/DL — HIGH (ref 70–99)
GLUCOSE BLDC GLUCOMTR-MCNC: 366 MG/DL — HIGH (ref 70–99)
GLUCOSE SERPL-MCNC: 243 MG/DL — HIGH (ref 70–99)
GLUCOSE SERPL-MCNC: 243 MG/DL — HIGH (ref 70–99)
GLUCOSE UR QL: NEGATIVE — SIGNIFICANT CHANGE UP
HCT VFR BLD CALC: 21.4 % — LOW (ref 39–50)
HCT VFR BLD CALC: 22.6 % — LOW (ref 39–50)
HGB BLD-MCNC: 7.7 G/DL — LOW (ref 13–17)
HGB BLD-MCNC: 7.8 G/DL — LOW (ref 13–17)
IMM GRANULOCYTES NFR BLD AUTO: 0.5 % — SIGNIFICANT CHANGE UP (ref 0–0.9)
KETONES UR-MCNC: NEGATIVE — SIGNIFICANT CHANGE UP
LACTATE SERPL-SCNC: 0.8 MMOL/L — SIGNIFICANT CHANGE UP (ref 0.5–2)
LEUKOCYTE ESTERASE UR-ACNC: NEGATIVE — SIGNIFICANT CHANGE UP
LYMPHOCYTES # BLD AUTO: 1.67 K/UL — SIGNIFICANT CHANGE UP (ref 1–3.3)
LYMPHOCYTES # BLD AUTO: 9.7 % — LOW (ref 13–44)
MAGNESIUM SERPL-MCNC: 2 MG/DL — SIGNIFICANT CHANGE UP (ref 1.6–2.6)
MCHC RBC-ENTMCNC: 26.5 PG — LOW (ref 27–34)
MCHC RBC-ENTMCNC: 28.2 PG — SIGNIFICANT CHANGE UP (ref 27–34)
MCHC RBC-ENTMCNC: 34.1 GM/DL — SIGNIFICANT CHANGE UP (ref 32–36)
MCHC RBC-ENTMCNC: 36.4 GM/DL — HIGH (ref 32–36)
MCV RBC AUTO: 77.3 FL — LOW (ref 80–100)
MCV RBC AUTO: 77.7 FL — LOW (ref 80–100)
MONOCYTES # BLD AUTO: 1.31 K/UL — HIGH (ref 0–0.9)
MONOCYTES NFR BLD AUTO: 7.6 % — SIGNIFICANT CHANGE UP (ref 2–14)
NEUTROPHILS # BLD AUTO: 14.07 K/UL — HIGH (ref 1.8–7.4)
NEUTROPHILS NFR BLD AUTO: 81.8 % — HIGH (ref 43–77)
NITRITE UR-MCNC: NEGATIVE — SIGNIFICANT CHANGE UP
NRBC # BLD: 0 /100 WBCS — SIGNIFICANT CHANGE UP (ref 0–0)
NRBC # BLD: 0 /100 WBCS — SIGNIFICANT CHANGE UP (ref 0–0)
PH UR: 6 — SIGNIFICANT CHANGE UP (ref 5–8)
PLATELET # BLD AUTO: 233 K/UL — SIGNIFICANT CHANGE UP (ref 150–400)
PLATELET # BLD AUTO: 245 K/UL — SIGNIFICANT CHANGE UP (ref 150–400)
POTASSIUM SERPL-MCNC: 4.4 MMOL/L — SIGNIFICANT CHANGE UP (ref 3.5–5.3)
POTASSIUM SERPL-MCNC: 4.5 MMOL/L — SIGNIFICANT CHANGE UP (ref 3.5–5.3)
POTASSIUM SERPL-SCNC: 4.4 MMOL/L — SIGNIFICANT CHANGE UP (ref 3.5–5.3)
POTASSIUM SERPL-SCNC: 4.5 MMOL/L — SIGNIFICANT CHANGE UP (ref 3.5–5.3)
PROT UR-MCNC: SIGNIFICANT CHANGE UP
RBC # BLD: 2.77 M/UL — LOW (ref 4.2–5.8)
RBC # BLD: 2.91 M/UL — LOW (ref 4.2–5.8)
RBC # FLD: 17.2 % — HIGH (ref 10.3–14.5)
RBC # FLD: 17.2 % — HIGH (ref 10.3–14.5)
SODIUM SERPL-SCNC: 137 MMOL/L — SIGNIFICANT CHANGE UP (ref 135–145)
SODIUM SERPL-SCNC: 137 MMOL/L — SIGNIFICANT CHANGE UP (ref 135–145)
SP GR SPEC: 1.01 — SIGNIFICANT CHANGE UP (ref 1.01–1.02)
UROBILINOGEN FLD QL: SIGNIFICANT CHANGE UP
WBC # BLD: 17.21 K/UL — HIGH (ref 3.8–10.5)
WBC # BLD: 17.68 K/UL — HIGH (ref 3.8–10.5)
WBC # FLD AUTO: 17.21 K/UL — HIGH (ref 3.8–10.5)
WBC # FLD AUTO: 17.68 K/UL — HIGH (ref 3.8–10.5)

## 2023-04-13 PROCEDURE — 99233 SBSQ HOSP IP/OBS HIGH 50: CPT

## 2023-04-13 PROCEDURE — 71045 X-RAY EXAM CHEST 1 VIEW: CPT | Mod: 26

## 2023-04-13 RX ORDER — CEFEPIME 1 G/1
1000 INJECTION, POWDER, FOR SOLUTION INTRAMUSCULAR; INTRAVENOUS EVERY 24 HOURS
Refills: 0 | Status: DISCONTINUED | OUTPATIENT
Start: 2023-04-13 | End: 2023-04-14

## 2023-04-13 RX ORDER — INSULIN GLARGINE 100 [IU]/ML
18 INJECTION, SOLUTION SUBCUTANEOUS AT BEDTIME
Refills: 0 | Status: DISCONTINUED | OUTPATIENT
Start: 2023-04-13 | End: 2023-04-14

## 2023-04-13 RX ORDER — METRONIDAZOLE 500 MG
500 TABLET ORAL EVERY 12 HOURS
Refills: 0 | Status: DISCONTINUED | OUTPATIENT
Start: 2023-04-13 | End: 2023-04-17

## 2023-04-13 RX ORDER — SODIUM CHLORIDE 9 MG/ML
1000 INJECTION INTRAMUSCULAR; INTRAVENOUS; SUBCUTANEOUS
Refills: 0 | Status: DISCONTINUED | OUTPATIENT
Start: 2023-04-13 | End: 2023-04-14

## 2023-04-13 RX ORDER — INSULIN LISPRO 100/ML
8 VIAL (ML) SUBCUTANEOUS
Refills: 0 | Status: DISCONTINUED | OUTPATIENT
Start: 2023-04-13 | End: 2023-04-14

## 2023-04-13 RX ORDER — ACETAMINOPHEN 500 MG
1000 TABLET ORAL ONCE
Refills: 0 | Status: COMPLETED | OUTPATIENT
Start: 2023-04-13 | End: 2023-04-13

## 2023-04-13 RX ADMIN — CLOPIDOGREL BISULFATE 75 MILLIGRAM(S): 75 TABLET, FILM COATED ORAL at 11:09

## 2023-04-13 RX ADMIN — Medication 2: at 17:12

## 2023-04-13 RX ADMIN — ISOSORBIDE MONONITRATE 30 MILLIGRAM(S): 60 TABLET, EXTENDED RELEASE ORAL at 11:10

## 2023-04-13 RX ADMIN — Medication 8 UNIT(S): at 17:12

## 2023-04-13 RX ADMIN — Medication 1 TABLET(S): at 16:15

## 2023-04-13 RX ADMIN — Medication 81 MILLIGRAM(S): at 11:10

## 2023-04-13 RX ADMIN — CILOSTAZOL 100 MILLIGRAM(S): 100 TABLET ORAL at 17:12

## 2023-04-13 RX ADMIN — Medication 1 TABLET(S): at 11:10

## 2023-04-13 RX ADMIN — Medication 50 MILLIGRAM(S): at 06:29

## 2023-04-13 RX ADMIN — Medication 100 MILLIGRAM(S): at 11:09

## 2023-04-13 RX ADMIN — CHLORHEXIDINE GLUCONATE 1 APPLICATION(S): 213 SOLUTION TOPICAL at 11:16

## 2023-04-13 RX ADMIN — PANTOPRAZOLE SODIUM 40 MILLIGRAM(S): 20 TABLET, DELAYED RELEASE ORAL at 06:28

## 2023-04-13 RX ADMIN — Medication 5 UNIT(S): at 08:26

## 2023-04-13 RX ADMIN — Medication 5 UNIT(S): at 12:37

## 2023-04-13 RX ADMIN — Medication 10: at 12:37

## 2023-04-13 RX ADMIN — INSULIN GLARGINE 18 UNIT(S): 100 INJECTION, SOLUTION SUBCUTANEOUS at 21:43

## 2023-04-13 RX ADMIN — CEFEPIME 100 MILLIGRAM(S): 1 INJECTION, POWDER, FOR SOLUTION INTRAMUSCULAR; INTRAVENOUS at 11:09

## 2023-04-13 RX ADMIN — Medication 1 TABLET(S): at 15:03

## 2023-04-13 RX ADMIN — ATORVASTATIN CALCIUM 40 MILLIGRAM(S): 80 TABLET, FILM COATED ORAL at 21:43

## 2023-04-13 RX ADMIN — Medication 60 MILLIGRAM(S): at 06:28

## 2023-04-13 RX ADMIN — Medication 4: at 08:26

## 2023-04-13 RX ADMIN — Medication 100 MILLIGRAM(S): at 21:40

## 2023-04-13 RX ADMIN — SODIUM CHLORIDE 100 MILLILITER(S): 9 INJECTION INTRAMUSCULAR; INTRAVENOUS; SUBCUTANEOUS at 13:53

## 2023-04-13 RX ADMIN — Medication 400 MILLIGRAM(S): at 03:17

## 2023-04-13 RX ADMIN — CILOSTAZOL 100 MILLIGRAM(S): 100 TABLET ORAL at 06:29

## 2023-04-13 NOTE — PROGRESS NOTE ADULT - SUBJECTIVE AND OBJECTIVE BOX
DATE OF SERVICE: 04-13-23 @ 11:41    Patient is a 72y old  Male who presents with a chief complaint of Transferred from [facility] for possible vascular intervention. (13 Apr 2023 11:00)      INTERVAL HISTORY: Feels ok. Fatigued.     REVIEW OF SYSTEMS:  CONSTITUTIONAL: No weakness  EYES/ENT: No visual changes;  No throat pain   NECK: No pain or stiffness  RESPIRATORY: No cough, wheezing; No shortness of breath  CARDIOVASCULAR: No chest pain or palpitations  GASTROINTESTINAL: No abdominal  pain. No nausea, vomiting, or hematemesis  GENITOURINARY: No dysuria, frequency or hematuria  NEUROLOGICAL: No stroke like symptoms  SKIN: No rashes    TELEMETRY Personally reviewed:  100-120  	  MEDICATIONS:  isosorbide   mononitrate ER Tablet (IMDUR) 30 milliGRAM(s) Oral daily  metoprolol succinate ER 50 milliGRAM(s) Oral daily        PHYSICAL EXAM:  T(C): 37.1 (04-13-23 @ 11:16), Max: 38.7 (04-13-23 @ 02:27)  HR: 115 (04-13-23 @ 11:16) (111 - 121)  BP: 139/73 (04-13-23 @ 11:16) (116/70 - 156/82)  RR: 18 (04-13-23 @ 11:16) (17 - 18)  SpO2: 98% (04-13-23 @ 11:16) (96% - 98%)  Wt(kg): --  I&O's Summary    12 Apr 2023 07:01  -  13 Apr 2023 07:00  --------------------------------------------------------  IN: 690 mL / OUT: 0 mL / NET: 690 mL          Appearance: In no distress	  HEENT:    PERRL, EOMI	  Cardiovascular:  S1 S2, No JVD  Respiratory: Lungs clear to auscultation	  Gastrointestinal:  Soft, Non-tender, + BS	  Vascularature:  No edema of LE  Psychiatric: Appropriate affect   Neuro: no acute focal deficits                               7.8    17.68 )-----------( 233      ( 13 Apr 2023 07:34 )             21.4     04-13    137  |  105  |  48<H>  ----------------------------<  243<H>  4.5   |  22  |  2.54<H>    Ca    8.7      13 Apr 2023 07:34  Phos  3.5     04-12  Mg     2.0     04-13          Labs personally reviewed      ASSESSMENT/PLAN: 	    73 y/o M--patient with a history of type 2 DM complicated with CKD4, PAD with unclear past stenting hx in the RLE, essential HTN tobacco use,  transferred from [facility] 3/30/23 with spouse reporting the patient had lost his R hallux toenail about a week prior to that admission and noted dark discoloration of the R hallux and 2nd toe with foul odor emanating from the R foot    Plan of Care:    #Pre-op risk stratification  - S/p peripheral angio 4/7/2023 revealing  of SFA bilaterally s/p DCBA and stenting of R SFA. Tolerated procedure well.   - Plan for stent to L SFA Monday 4/10 with vascular team.   - Pt displays no evidence of coronary ischemia  - TTE shows normal LV systolic function with no significant structural abnormalities.   - From a cardiac standpoint, Pt is optimized for vascular intervention  - Pt is intermediate to moderate risk for desired procedure  - Further recommendations to follow pending clinical course.   - s/p partial amputation of both 1st metatarsals 4/12-- tolerated surgery well    #PAD  - Continue Cilostazol, and DAPT therapy  - Statin therapy  - Vascular input appreciated    #CKD  - Renal input appreciated    #CAD  - S/p PCI   -Continue Plavix, statin and BB      Lalitha Jefferson, AG-DELICIA Liu DO [facility]  Cardiovascular Medicine  [address] [place] [address], [address]  Available through call or text on [employer] TEAMs  Office: [phone]   DATE OF SERVICE: 04-13-23 @ 11:41    Patient is a 72y old  Male who presents with a chief complaint of Transferred from [facility] for possible vascular intervention. (13 Apr 2023 11:00)      INTERVAL HISTORY: Feels ok. Fatigued.     REVIEW OF SYSTEMS:  CONSTITUTIONAL: No weakness  EYES/ENT: No visual changes;  No throat pain   NECK: No pain or stiffness  RESPIRATORY: No cough, wheezing; No shortness of breath  CARDIOVASCULAR: No chest pain or palpitations  GASTROINTESTINAL: No abdominal  pain. No nausea, vomiting, or hematemesis  GENITOURINARY: No dysuria, frequency or hematuria  NEUROLOGICAL: No stroke like symptoms  SKIN: No rashes    TELEMETRY Personally reviewed:  100-120  	  MEDICATIONS:  isosorbide   mononitrate ER Tablet (IMDUR) 30 milliGRAM(s) Oral daily  metoprolol succinate ER 50 milliGRAM(s) Oral daily        PHYSICAL EXAM:  T(C): 37.1 (04-13-23 @ 11:16), Max: 38.7 (04-13-23 @ 02:27)  HR: 115 (04-13-23 @ 11:16) (111 - 121)  BP: 139/73 (04-13-23 @ 11:16) (116/70 - 156/82)  RR: 18 (04-13-23 @ 11:16) (17 - 18)  SpO2: 98% (04-13-23 @ 11:16) (96% - 98%)  Wt(kg): --  I&O's Summary    12 Apr 2023 07:01  -  13 Apr 2023 07:00  --------------------------------------------------------  IN: 690 mL / OUT: 0 mL / NET: 690 mL          Appearance: In no distress	  HEENT:    PERRL, EOMI	  Cardiovascular:  S1 S2, No JVD  Respiratory: Lungs clear to auscultation	  Gastrointestinal:  Soft, Non-tender, + BS	  Vascularature:  No edema of LE  Psychiatric: Appropriate affect   Neuro: no acute focal deficits                               7.8    17.68 )-----------( 233      ( 13 Apr 2023 07:34 )             21.4     04-13    137  |  105  |  48<H>  ----------------------------<  243<H>  4.5   |  22  |  2.54<H>    Ca    8.7      13 Apr 2023 07:34  Phos  3.5     04-12  Mg     2.0     04-13          Labs personally reviewed      ASSESSMENT/PLAN: 	    73 y/o M--patient with a history of type 2 DM complicated with CKD4, PAD with unclear past stenting hx in the RLE, essential HTN tobacco use,  transferred from [facility] 3/30/23 with spouse reporting the patient had lost his R hallux toenail about a week prior to that admission and noted dark discoloration of the R hallux and 2nd toe with foul odor emanating from the R foot    Plan of Care:    #Pre-op risk stratification  - S/p peripheral angio 4/7/2023 revealing  of SFA bilaterally s/p DCBA and stenting of R SFA. Tolerated procedure well.   - Plan for stent to L SFA Monday 4/10 with vascular team.   - Pt displays no evidence of coronary ischemia  - TTE shows normal LV systolic function with no significant structural abnormalities.   - From a cardiac standpoint, Pt is optimized for vascular intervention  - Pt is intermediate to moderate risk for desired procedure  - Further recommendations to follow pending clinical course.   - s/p partial amputation of both 1st metatarsals 4/12-- tolerated surgery well    #PAD  - Continue Cilostazol, and DAPT therapy  - Statin therapy  - Vascular input appreciated    #CKD  - Renal input appreciated    #CAD  - S/p PCI   -Continue Plavix, statin and BB    # Sinus Tachycardia  - HR elevated 100-120  - Possibly pre-renal post operatively vs reactive to infection (ID following)  - Patient asymptomatic. Will cont to monitor on tele.  - No intervention at this time.    SUHA Cordon-DELICIA Liu DO [facility]  Cardiovascular Medicine  [address], [address]  Available through call or text on [employer] TEAMs  Office: [phone]   DATE OF SERVICE: 04-13-23 @ 11:41    Patient is a 72y old  Male who presents with a chief complaint of Transferred from [facility] for possible vascular intervention. (13 Apr 2023 11:00)      INTERVAL HISTORY: Feels ok. Fatigued.     REVIEW OF SYSTEMS:  CONSTITUTIONAL: No weakness  EYES/ENT: No visual changes;  No throat pain   NECK: No pain or stiffness  RESPIRATORY: No cough, wheezing; No shortness of breath  CARDIOVASCULAR: No chest pain or palpitations  GASTROINTESTINAL: No abdominal  pain. No nausea, vomiting, or hematemesis  GENITOURINARY: No dysuria, frequency or hematuria  NEUROLOGICAL: No stroke like symptoms  SKIN: No rashes    TELEMETRY Personally reviewed:  100-120  	  MEDICATIONS:  isosorbide   mononitrate ER Tablet (IMDUR) 30 milliGRAM(s) Oral daily  metoprolol succinate ER 50 milliGRAM(s) Oral daily        PHYSICAL EXAM:  T(C): 37.1 (04-13-23 @ 11:16), Max: 38.7 (04-13-23 @ 02:27)  HR: 115 (04-13-23 @ 11:16) (111 - 121)  BP: 139/73 (04-13-23 @ 11:16) (116/70 - 156/82)  RR: 18 (04-13-23 @ 11:16) (17 - 18)  SpO2: 98% (04-13-23 @ 11:16) (96% - 98%)  Wt(kg): --  I&O's Summary    12 Apr 2023 07:01  -  13 Apr 2023 07:00  --------------------------------------------------------  IN: 690 mL / OUT: 0 mL / NET: 690 mL          Appearance: In no distress	  HEENT:    PERRL, EOMI	  Cardiovascular:  S1 S2, No JVD  Respiratory: Lungs clear to auscultation	  Gastrointestinal:  Soft, Non-tender, + BS	  Vascularature:  No edema of LE  Psychiatric: Appropriate affect   Neuro: no acute focal deficits                               7.8    17.68 )-----------( 233      ( 13 Apr 2023 07:34 )             21.4     04-13    137  |  105  |  48<H>  ----------------------------<  243<H>  4.5   |  22  |  2.54<H>    Ca    8.7      13 Apr 2023 07:34  Phos  3.5     04-12  Mg     2.0     04-13          Labs personally reviewed      ASSESSMENT/PLAN: 	    73 y/o M--patient with a history of type 2 DM complicated with CKD4, PAD with unclear past stenting hx in the RLE, essential HTN tobacco use,  transferred from [facility] 3/30/23 with spouse reporting the patient had lost his R hallux toenail about a week prior to that admission and noted dark discoloration of the R hallux and 2nd toe with foul odor emanating from the R foot    Plan of Care:    #Pre-op risk stratification  - S/p peripheral angio 4/7/2023 revealing  of SFA bilaterally s/p DCBA and stenting of R SFA. Tolerated procedure well.   - Plan for stent to L SFA Monday 4/10 with vascular team.   - Pt displays no evidence of coronary ischemia  - TTE shows normal LV systolic function with no significant structural abnormalities.   - From a cardiac standpoint, Pt is optimized for vascular intervention  - Pt is intermediate to moderate risk for desired procedure  - Further recommendations to follow pending clinical course.   - s/p partial amputation of both 1st metatarsals 4/12-- tolerated surgery well    #PAD  - Continue Cilostazol, and DAPT therapy  - Statin therapy  - Vascular input appreciated    #CKD  - Renal input appreciated    #CAD  - S/p PCI   -Continue Plavix, statin and BB    # Sinus Tachycardia  - HR elevated 100-120  - Possibly pre-renal post operatively vs reactive to infection (ID following)  - Patient asymptomatic. Will cont to monitor on tele.  - No intervention at this time.            SUHA Cordon-DELICIA Liu DO [facility]  Cardiovascular Medicine  [address], [address]  Available through call or text on [employer] TEAMs  Office: [phone]

## 2023-04-13 NOTE — PROGRESS NOTE ADULT - PROBLEM SELECTOR PLAN 2
VA arterial duplex showing mod severe disease.   - s/p peripheral angio 4/7/2023 revealing  of SFA bilaterally s/p DCBA and stenting of R SFA  - Vascular Cardiology consult appreciated  - patient with known PAD with CLI [place] Classification 6 with bilateral  of the SFAs,   - s/p DCBA and stent to the distal R SFA 4/7/23   - now s/p stent to L SFA on 4/10  - c/w DAPT, cilostazol

## 2023-04-13 NOTE — PROGRESS NOTE ADULT - ASSESSMENT
73 y/o M--patient with a history of type 2 DM complicated with CKD4, PAD with past stenting RLE, essential HTN, apparently still active cigarette use until one week ago, UNVACCINATED against COVID-19, with initial admission to [facility] 3/30/23 in the setting of patient with mild cognitive impairment, with spouse reporting the patient had lost his RIGHT hallux toenail about a week prior to that admission and noted dark discoloration of the RIGHT hallux and 2nd toe with foul odor emanating from the RIGHT foot+ left foot gangrene    Patient s/p bilateral partial first and second ray resection today 4/12. Per brief op note, high concern for viability, low concern for residual infection.     Leukocytosis  Febrile overnight 101.7F  PCN allergy in childhood, does not recall reaction    Recommend:  #Fever, leukocytosis  -F/U blood cultures  -Start cefepime 1 gram IV q 24 hours and flagyl 500 mg IV q 12 hours  -CXR  -UA  -Monitor fever curve  -Trend WBC  -F/U OR cultures  -Monitor for signs of allergic reaction to cefepime    Alejandro No MD  Available through MS Teams  If no response, or after 5pm/weekends, call [phone]    Plan discussed with primary team.

## 2023-04-13 NOTE — PROGRESS NOTE ADULT - PROBLEM SELECTOR PLAN 1
new fever to 101.7 on 4/13 AM with worsening leukocytosis to 17K. meets sepsis criteria.   - CXR with no overt infiltrate/consolidation on personal review, f/u official radiology report  - f/u UA  - f/u 4/13 blood cx - in lab  - 4/12 OR bone cx with GPCs, continue to follow  - discussed with ID attending Dr. No  - start cefepime 1g q24h and flagyl 500mg q12h as per ID recs  - IV hydration with NS @ 100cc/hr for total 1L today for volume resuscitation

## 2023-04-13 NOTE — PROGRESS NOTE ADULT - PROBLEM SELECTOR PLAN 8
DVT ppx: hep subc    Dispo: pending PT eval    Offered to update wife several times, but patient declined. Prefers to update himself.

## 2023-04-13 NOTE — PROGRESS NOTE ADULT - SUBJECTIVE AND OBJECTIVE BOX
Samara Sung MD  Division of Hospital Medicine  [facility]   Available on Microsoft Teams (Mon-Fri 8am-5pm)    * messages preferred prior to calls  Other Times:  [phone]      Patient is a 72y old  Male who presents with a chief complaint of Transferred from [facility] for possible vascular intervention. (13 Apr 2023 11:41)      SUBJECTIVE / OVERNIGHT EVENTS: febrile to 101.7 overnight with new leukocytosis. patient states he was not aware nor subjectively felt the fever. no chills, chest pain, cough, dyspnea, abd pain, n/v, diarrhea, nor dysuria that he reports  ADDITIONAL REVIEW OF SYSTEMS:    Tele reviewed: SR/ST with -130s overnight    MEDICATIONS  (STANDING):  aspirin enteric coated 81 milliGRAM(s) Oral daily  atorvastatin 40 milliGRAM(s) Oral at bedtime  cefepime   IVPB 1000 milliGRAM(s) IV Intermittent every 24 hours  chlorhexidine 2% Cloths 1 Application(s) Topical daily  cilostazol 100 milliGRAM(s) Oral two times a day  clopidogrel Tablet 75 milliGRAM(s) Oral daily  dextrose 5%. 1000 milliLiter(s) (50 mL/Hr) IV Continuous <Continuous>  dextrose 5%. 1000 milliLiter(s) (100 mL/Hr) IV Continuous <Continuous>  dextrose 50% Injectable 12.5 Gram(s) IV Push once  dextrose 50% Injectable 25 Gram(s) IV Push once  glucagon  Injectable 1 milliGRAM(s) IntraMuscular once  insulin glargine Injectable (LANTUS) 18 Unit(s) SubCutaneous at bedtime  insulin lispro (ADMELOG) corrective regimen sliding scale   SubCutaneous three times a day before meals  insulin lispro (ADMELOG) corrective regimen sliding scale   SubCutaneous at bedtime  insulin lispro Injectable (ADMELOG) 8 Unit(s) SubCutaneous three times a day before meals  isosorbide   mononitrate ER Tablet (IMDUR) 30 milliGRAM(s) Oral daily  metoprolol succinate ER 50 milliGRAM(s) Oral daily  metroNIDAZOLE  IVPB 500 milliGRAM(s) IV Intermittent every 12 hours  Nephro-odrita 1 Tablet(s) Oral daily  NIFEdipine XL 60 milliGRAM(s) Oral daily  pantoprazole    Tablet 40 milliGRAM(s) Oral before breakfast  senna 2 Tablet(s) Oral at bedtime  sodium chloride 0.9%. 1000 milliLiter(s) (100 mL/Hr) IV Continuous <Continuous>    MEDICATIONS  (PRN):  acetaminophen  300 mG/codeine 30 mG 1 Tablet(s) Oral every 4 hours PRN Mild Pain (1 - 3)  dextrose Oral Gel 15 Gram(s) Oral once PRN Blood Glucose LESS THAN 70 milliGRAM(s)/deciliter  oxycodone    5 mG/acetaminophen 325 mG 1 Tablet(s) Oral every 6 hours PRN Severe Pain (7 - 10)      CAPILLARY BLOOD GLUCOSE      POCT Blood Glucose.: 366 mg/dL (13 Apr 2023 12:34)  POCT Blood Glucose.: 230 mg/dL (13 Apr 2023 08:09)  POCT Blood Glucose.: 160 mg/dL (12 Apr 2023 21:51)  POCT Blood Glucose.: 260 mg/dL (12 Apr 2023 16:38)    I&O's Summary    12 Apr 2023 07:01  -  13 Apr 2023 07:00  --------------------------------------------------------  IN: 690 mL / OUT: 0 mL / NET: 690 mL    13 Apr 2023 07:01  -  13 Apr 2023 13:34  --------------------------------------------------------  IN: 120 mL / OUT: 150 mL / NET: -30 mL        PHYSICAL EXAM:  Vital Signs Last 24 Hrs  T(C): 37.1 (13 Apr 2023 11:16), Max: 38.7 (13 Apr 2023 02:27)  T(F): 98.8 (13 Apr 2023 11:16), Max: 101.7 (13 Apr 2023 02:27)  HR: 115 (13 Apr 2023 11:16) (111 - 121)  BP: 139/73 (13 Apr 2023 11:16) (116/70 - 156/82)  BP(mean): --  RR: 18 (13 Apr 2023 11:16) (17 - 18)  SpO2: 98% (13 Apr 2023 11:16) (96% - 98%)    Parameters below as of 13 Apr 2023 11:16  Patient On (Oxygen Delivery Method): room air    CONSTITUTIONAL: NAD, well-developed, well-groomed  EYES: PERRLA; conjunctiva and sclera clear  ENMT: Moist oral mucosa, no pharyngeal injection or exudates; normal dentition  NECK: Supple, no palpable masses; no thyromegaly  RESPIRATORY: Normal respiratory effort; lungs are clear to auscultation bilaterally  CARDIOVASCULAR: Regular rate and rhythm, normal S1 and S2, no murmur/rub/gallop; No lower extremity edema  ABDOMEN: Soft, Nondistended, Nontender to palpation, normoactive bowel sounds  MUSCULOSKELETAL: No clubbing or cyanosis of digits; b/l feet in post-op dressing c/d/i  PSYCH: A+O to person, place, and time; affect appropriate  NEUROLOGY: CN 2-12 are intact and symmetric; no gross sensory deficits   SKIN: No rashes; no palpable lesions, b/l venous stasis changes, b/l groin sites c/d/i     LABS:                        7.8    17.68 )-----------( 233      ( 13 Apr 2023 07:34 )             21.4     04-13    137  |  105  |  48<H>  ----------------------------<  243<H>  4.5   |  22  |  2.54<H>    Ca    8.7      13 Apr 2023 07:34  Phos  3.5     04-12  Mg     2.0     04-13      PT/INR - ( 12 Apr 2023 06:21 )   PT: 15.5 sec;   INR: 1.33 ratio         PTT - ( 12 Apr 2023 06:21 )  PTT:28.9 sec          Culture - Tissue with Gram Stain (collected 12 Apr 2023 13:46)  Source: .Tissue Other  Gram Stain (12 Apr 2023 22:00):    Few polymorphonuclear leukocytes per low power field    Few Gram positive cocci in pairs per oil power field    Culture - Tissue with Gram Stain (collected 12 Apr 2023 13:36)  Source: .Tissue Other  Gram Stain (12 Apr 2023 22:41):    No polymorphonuclear cells seen per low power field    Rare Gram positive cocci in pairs per oil power field        RADIOLOGY & ADDITIONAL TESTS:  Results Reviewed: new worsening leukocytosis to 17K, OR cx growing GPCs  Imaging Personally Reviewed:  4/13/23 CXR with no infiltrate nor consolidation on personal review, official report pending  Electrocardiogram Personally Reviewed:    COORDINATION OF CARE:  Care Discussed with Consultants/Other Providers [Y]: ID attending Dr. No, medicine VINAYAK Carter  Prior or Outpatient Records Reviewed [Y/N]:

## 2023-04-13 NOTE — PROGRESS NOTE ADULT - ASSESSMENT
71 yo male with PMH of CKD4, PAD s/p stent, CAD, HTN, T2DM, current smoker who p/w LE dry gangrene and osteomyelitis now s/p R SFA stent on 4/7 and L SFA stent on 4/10 now s/p b/l partial 1st and 2nd ray resection on 4/12/23 with course c/b fever on IV cefepime and flagyl.

## 2023-04-13 NOTE — PROGRESS NOTE ADULT - PROBLEM SELECTOR PLAN 5
HbA1c 5.5%  - c/w sliding scale  - increase lantus to 18 units qHS given patient will be NPO after midnight  - increase pre-meal admelog to 8 units TID qAC, hold if NPO  - monitor FS qAC + qHS

## 2023-04-13 NOTE — PROGRESS NOTE ADULT - ASSESSMENT
72M presents with bilateral foot dry gangrene and ischemic changes.  - Pt seen and evaluated.  - Afebrile, WBC 17.68  - 4/12 s/p b/l P12RR: Right foot: skin edges well coapted, warm, no early ischemic changes, sutures intact, no signs of infection. Left foot: well coapted, viable, no early ischemic changes, no signs of infection  - Pe intra-op findings: high concern for viability, low concern for residual bone infection  - OR clean bone cultures and pathology pending  - ID recs monitor off IV abx, appreciated  - Pt is s/p b/l LE angiograms: RLE angiogram w/ SFA stent, LLE angio w/ 2-vessel r/o to foot  - Stable from podiatry for discharge tomorrow pending clean bone no growth  - Discussed with attending

## 2023-04-13 NOTE — PROGRESS NOTE ADULT - SUBJECTIVE AND OBJECTIVE BOX
CC: Patient is a 72y old  Male who presents with a chief complaint of Transferred from [facility] for possible vascular intervention. (13 Apr 2023 13:33)    ID following for foot gangrene, fever    Interval History/ROS: Patient febrile. OR cultures so far with gram stain positive for GPC pairs. Leukocytosis. Remains on room air.    Rest of ROS negative.    Allergies  penicillin (Unknown)    ANTIMICROBIALS:  cefepime   IVPB 1000 every 24 hours  metroNIDAZOLE  IVPB 500 every 12 hours    OTHER MEDS:  acetaminophen  300 mG/codeine 30 mG 1 Tablet(s) Oral every 4 hours PRN  aspirin enteric coated 81 milliGRAM(s) Oral daily  atorvastatin 40 milliGRAM(s) Oral at bedtime  chlorhexidine 2% Cloths 1 Application(s) Topical daily  cilostazol 100 milliGRAM(s) Oral two times a day  clopidogrel Tablet 75 milliGRAM(s) Oral daily  dextrose 5%. 1000 milliLiter(s) IV Continuous <Continuous>  dextrose 5%. 1000 milliLiter(s) IV Continuous <Continuous>  dextrose 50% Injectable 12.5 Gram(s) IV Push once  dextrose 50% Injectable 25 Gram(s) IV Push once  dextrose Oral Gel 15 Gram(s) Oral once PRN  glucagon  Injectable 1 milliGRAM(s) IntraMuscular once  insulin glargine Injectable (LANTUS) 18 Unit(s) SubCutaneous at bedtime  insulin lispro (ADMELOG) corrective regimen sliding scale   SubCutaneous three times a day before meals  insulin lispro (ADMELOG) corrective regimen sliding scale   SubCutaneous at bedtime  insulin lispro Injectable (ADMELOG) 8 Unit(s) SubCutaneous three times a day before meals  isosorbide   mononitrate ER Tablet (IMDUR) 30 milliGRAM(s) Oral daily  metoprolol succinate ER 50 milliGRAM(s) Oral daily  Nephro-dorita 1 Tablet(s) Oral daily  NIFEdipine XL 60 milliGRAM(s) Oral daily  oxycodone    5 mG/acetaminophen 325 mG 1 Tablet(s) Oral every 6 hours PRN  pantoprazole    Tablet 40 milliGRAM(s) Oral before breakfast  senna 2 Tablet(s) Oral at bedtime  sodium chloride 0.9%. 1000 milliLiter(s) IV Continuous <Continuous>    PE:    Vital Signs Last 24 Hrs  T(C): 37.1 (13 Apr 2023 11:16), Max: 38.7 (13 Apr 2023 02:27)  T(F): 98.8 (13 Apr 2023 11:16), Max: 101.7 (13 Apr 2023 02:27)  HR: 115 (13 Apr 2023 11:16) (111 - 121)  BP: 139/73 (13 Apr 2023 11:16) (116/70 - 156/82)  BP(mean): --  RR: 18 (13 Apr 2023 11:16) (17 - 18)  SpO2: 98% (13 Apr 2023 11:16) (96% - 98%)    Parameters below as of 13 Apr 2023 11:16  Patient On (Oxygen Delivery Method): room air    Gen: AOx3, NAD  CV: S1+S2 normal, no murmurs  Resp: Clear bilat, no resp distress  Abd: Soft, nontender, +BS  Ext: No LE edema, no wounds  : No Oliveira  IV/Skin: No thrombophlebitis, bilateral feet bandaged  Neuro: no focal deficits    LABS:                          7.8    17.68 )-----------( 233      ( 13 Apr 2023 07:34 )             21.4       04-13    137  |  105  |  48<H>  ----------------------------<  243<H>  4.5   |  22  |  2.54<H>    Ca    8.7      13 Apr 2023 07:34  Phos  3.5     04-12  Mg     2.0     04-13    MICROBIOLOGY:  v  .Tissue Other  04-12-23 --  --    Few polymorphonuclear leukocytes per low power field  Few Gram positive cocci in pairs per oil power field      .Tissue Other  04-12-23   No growth  --    No polymorphonuclear cells seen per low power field  Rare Gram positive cocci in pairs per oil power field      .Abscess Left foot  04-04-23   Few Citrobacter freundii complex  Rare Staphylococcus simulans "Susceptibilities not performed"  Few Helcococcus kunzii "Susceptibilities not performed"  Rare Porphyromonas somerae "Susceptibilities not performed"  --  Citrobacter freundii complex    RADIOLOGY:    < from: Xray Foot AP + Lateral + Oblique, Bilat (04.12.23 @ 09:49) >  IMPRESSION:  Status post bilateral partial 1st and 2nd ray resections through distal   1st metatarsal shafts and portion of 2nd metatarsal heads including   removed hallux sesamoid bones.    Sharp smooth slightly beveled osseous stump margins with postsurgical   changes in the overlying soft tissues. No proximally tracking gas   collections beyond the amputation margins and no focal areas of   osteomyelitis.    Remainder of both feet unchanged.    Also correlate with intraoperative findings.    < end of copied text >

## 2023-04-13 NOTE — PROGRESS NOTE ADULT - PROBLEM SELECTOR PLAN 3
Pt with stay at OSH on iv abx seen by pod and ID there.   - wound cx grew Citrobacter, staph simulans & helcococcus yassine, and poryphyromonas  - now s/p bilateral partial first and second ray resection, closed, EBL 30cc, patient bled moderately on left but inadequately on right, high concern for viability, low concern for residual infection as bone quality at level of resection was hard and adequate with Podiatry on 4/12  - f/u clean bone blood cx obtained in OR 4/12 - growing GPCs  - ID re-consulted re: need for abx on d/c, recs appreciated  - discussed with ID Attending Dr. 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 however with new fever on 4/13. plan as above

## 2023-04-13 NOTE — PROGRESS NOTE ADULT - SUBJECTIVE AND OBJECTIVE BOX
Patient is a 72y old  Male who presents with a chief complaint of Transferred from [facility] for possible vascular intervention. (12 Apr 2023 17:11)       INTERVAL HPI/OVERNIGHT EVENTS:  Patient seen and evaluated at bedside.  Pt is resting comfortable in NAD. Denies N/V/F/C.     Allergies    penicillin (Unknown)    Intolerances        Vital Signs Last 24 Hrs  T(C): 37.3 (13 Apr 2023 05:49), Max: 38.7 (13 Apr 2023 02:27)  T(F): 99.2 (13 Apr 2023 05:49), Max: 101.7 (13 Apr 2023 02:27)  HR: 111 (13 Apr 2023 05:49) (111 - 121)  BP: 116/70 (13 Apr 2023 05:49) (116/70 - 156/82)  BP(mean): --  RR: 18 (13 Apr 2023 05:49) (17 - 18)  SpO2: 96% (13 Apr 2023 05:49) (96% - 97%)    Parameters below as of 13 Apr 2023 05:49  Patient On (Oxygen Delivery Method): room air        LABS:                        7.8    17.68 )-----------( 233      ( 13 Apr 2023 07:34 )             21.4     04-13    137  |  105  |  48<H>  ----------------------------<  243<H>  4.5   |  22  |  2.54<H>    Ca    8.7      13 Apr 2023 07:34  Phos  3.5     04-12  Mg     2.0     04-13      PT/INR - ( 12 Apr 2023 06:21 )   PT: 15.5 sec;   INR: 1.33 ratio         PTT - ( 12 Apr 2023 06:21 )  PTT:28.9 sec    CAPILLARY BLOOD GLUCOSE      POCT Blood Glucose.: 230 mg/dL (13 Apr 2023 08:09)  POCT Blood Glucose.: 160 mg/dL (12 Apr 2023 21:51)  POCT Blood Glucose.: 260 mg/dL (12 Apr 2023 16:38)  POCT Blood Glucose.: 177 mg/dL (12 Apr 2023 11:33)      Lower Extremity Physical Exam:  Vascular: DP/PT 0/4, B/L, CFT <3 seconds B/L, Temperature gradient warm to cool, B/L.   Neuro: Epicritic sensation diminished to the level of the toes, B/L.  Musculoskeletal/Ortho: Unremarkable.  Skin: 4/12 s/p b/l P12RR: Right foot: skin edges well coapted, warm, no early ischemic changes, sutures intact, no signs of infection. Left foot: well coapted, viable, no early ischemic changes, no signs of infection    RADIOLOGY & ADDITIONAL TESTS:

## 2023-04-14 DIAGNOSIS — R00.0 TACHYCARDIA, UNSPECIFIED: ICD-10-CM

## 2023-04-14 LAB
-  AMIKACIN: SIGNIFICANT CHANGE UP
-  AMOXICILLIN/CLAVULANIC ACID: SIGNIFICANT CHANGE UP
-  AMPICILLIN/SULBACTAM: SIGNIFICANT CHANGE UP
-  AMPICILLIN/SULBACTAM: SIGNIFICANT CHANGE UP
-  AMPICILLIN: SIGNIFICANT CHANGE UP
-  AZTREONAM: SIGNIFICANT CHANGE UP
-  CEFAZOLIN: SIGNIFICANT CHANGE UP
-  CEFAZOLIN: SIGNIFICANT CHANGE UP
-  CEFEPIME: SIGNIFICANT CHANGE UP
-  CEFOXITIN: SIGNIFICANT CHANGE UP
-  CEFTRIAXONE: SIGNIFICANT CHANGE UP
-  CIPROFLOXACIN: SIGNIFICANT CHANGE UP
-  CLINDAMYCIN: SIGNIFICANT CHANGE UP
-  ERTAPENEM: SIGNIFICANT CHANGE UP
-  ERYTHROMYCIN: SIGNIFICANT CHANGE UP
-  GENTAMICIN: SIGNIFICANT CHANGE UP
-  GENTAMICIN: SIGNIFICANT CHANGE UP
-  IMIPENEM: SIGNIFICANT CHANGE UP
-  LEVOFLOXACIN: SIGNIFICANT CHANGE UP
-  MEROPENEM: SIGNIFICANT CHANGE UP
-  OXACILLIN: SIGNIFICANT CHANGE UP
-  PIPERACILLIN/TAZOBACTAM: SIGNIFICANT CHANGE UP
-  RIFAMPIN: SIGNIFICANT CHANGE UP
-  TETRACYCLINE: SIGNIFICANT CHANGE UP
-  TOBRAMYCIN: SIGNIFICANT CHANGE UP
-  TRIMETHOPRIM/SULFAMETHOXAZOLE: SIGNIFICANT CHANGE UP
-  TRIMETHOPRIM/SULFAMETHOXAZOLE: SIGNIFICANT CHANGE UP
-  VANCOMYCIN: SIGNIFICANT CHANGE UP
ANION GAP SERPL CALC-SCNC: 11 MMOL/L — SIGNIFICANT CHANGE UP (ref 5–17)
BASOPHILS # BLD AUTO: 0.05 K/UL — SIGNIFICANT CHANGE UP (ref 0–0.2)
BASOPHILS NFR BLD AUTO: 0.3 % — SIGNIFICANT CHANGE UP (ref 0–2)
BUN SERPL-MCNC: 49 MG/DL — HIGH (ref 7–23)
CALCIUM SERPL-MCNC: 8.8 MG/DL — SIGNIFICANT CHANGE UP (ref 8.4–10.5)
CHLORIDE SERPL-SCNC: 106 MMOL/L — SIGNIFICANT CHANGE UP (ref 96–108)
CO2 SERPL-SCNC: 22 MMOL/L — SIGNIFICANT CHANGE UP (ref 22–31)
CREAT SERPL-MCNC: 2.62 MG/DL — HIGH (ref 0.5–1.3)
EGFR: 25 ML/MIN/1.73M2 — LOW
EOSINOPHIL # BLD AUTO: 0.11 K/UL — SIGNIFICANT CHANGE UP (ref 0–0.5)
EOSINOPHIL NFR BLD AUTO: 0.7 % — SIGNIFICANT CHANGE UP (ref 0–6)
GLUCOSE BLDC GLUCOMTR-MCNC: 175 MG/DL — HIGH (ref 70–99)
GLUCOSE BLDC GLUCOMTR-MCNC: 184 MG/DL — HIGH (ref 70–99)
GLUCOSE BLDC GLUCOMTR-MCNC: 251 MG/DL — HIGH (ref 70–99)
GLUCOSE BLDC GLUCOMTR-MCNC: 288 MG/DL — HIGH (ref 70–99)
GLUCOSE SERPL-MCNC: 179 MG/DL — HIGH (ref 70–99)
HCT VFR BLD CALC: 23.6 % — LOW (ref 39–50)
HGB BLD-MCNC: 8.1 G/DL — LOW (ref 13–17)
IMM GRANULOCYTES NFR BLD AUTO: 0.7 % — SIGNIFICANT CHANGE UP (ref 0–0.9)
LYMPHOCYTES # BLD AUTO: 1.52 K/UL — SIGNIFICANT CHANGE UP (ref 1–3.3)
LYMPHOCYTES # BLD AUTO: 9.4 % — LOW (ref 13–44)
MAGNESIUM SERPL-MCNC: 2.1 MG/DL — SIGNIFICANT CHANGE UP (ref 1.6–2.6)
MCHC RBC-ENTMCNC: 26.3 PG — LOW (ref 27–34)
MCHC RBC-ENTMCNC: 34.3 GM/DL — SIGNIFICANT CHANGE UP (ref 32–36)
MCV RBC AUTO: 76.6 FL — LOW (ref 80–100)
METHOD TYPE: SIGNIFICANT CHANGE UP
METHOD TYPE: SIGNIFICANT CHANGE UP
MONOCYTES # BLD AUTO: 1.28 K/UL — HIGH (ref 0–0.9)
MONOCYTES NFR BLD AUTO: 7.9 % — SIGNIFICANT CHANGE UP (ref 2–14)
NEUTROPHILS # BLD AUTO: 13.12 K/UL — HIGH (ref 1.8–7.4)
NEUTROPHILS NFR BLD AUTO: 81 % — HIGH (ref 43–77)
NRBC # BLD: 0 /100 WBCS — SIGNIFICANT CHANGE UP (ref 0–0)
PHOSPHATE SERPL-MCNC: 3.9 MG/DL — SIGNIFICANT CHANGE UP (ref 2.5–4.5)
PLATELET # BLD AUTO: 222 K/UL — SIGNIFICANT CHANGE UP (ref 150–400)
POTASSIUM SERPL-MCNC: 4.4 MMOL/L — SIGNIFICANT CHANGE UP (ref 3.5–5.3)
POTASSIUM SERPL-SCNC: 4.4 MMOL/L — SIGNIFICANT CHANGE UP (ref 3.5–5.3)
RBC # BLD: 3.08 M/UL — LOW (ref 4.2–5.8)
RBC # FLD: 16.8 % — HIGH (ref 10.3–14.5)
SODIUM SERPL-SCNC: 139 MMOL/L — SIGNIFICANT CHANGE UP (ref 135–145)
WBC # BLD: 16.19 K/UL — HIGH (ref 3.8–10.5)
WBC # FLD AUTO: 16.19 K/UL — HIGH (ref 3.8–10.5)

## 2023-04-14 PROCEDURE — 99233 SBSQ HOSP IP/OBS HIGH 50: CPT

## 2023-04-14 PROCEDURE — 99232 SBSQ HOSP IP/OBS MODERATE 35: CPT

## 2023-04-14 RX ORDER — SODIUM CHLORIDE 9 MG/ML
1000 INJECTION INTRAMUSCULAR; INTRAVENOUS; SUBCUTANEOUS
Refills: 0 | Status: DISCONTINUED | OUTPATIENT
Start: 2023-04-14 | End: 2023-04-18

## 2023-04-14 RX ORDER — VANCOMYCIN HCL 1 G
1000 VIAL (EA) INTRAVENOUS ONCE
Refills: 0 | Status: COMPLETED | OUTPATIENT
Start: 2023-04-14 | End: 2023-04-14

## 2023-04-14 RX ORDER — INSULIN LISPRO 100/ML
10 VIAL (ML) SUBCUTANEOUS
Refills: 0 | Status: DISCONTINUED | OUTPATIENT
Start: 2023-04-14 | End: 2023-04-18

## 2023-04-14 RX ORDER — INSULIN GLARGINE 100 [IU]/ML
22 INJECTION, SOLUTION SUBCUTANEOUS AT BEDTIME
Refills: 0 | Status: DISCONTINUED | OUTPATIENT
Start: 2023-04-14 | End: 2023-04-18

## 2023-04-14 RX ORDER — CIPROFLOXACIN LACTATE 400MG/40ML
200 VIAL (ML) INTRAVENOUS EVERY 12 HOURS
Refills: 0 | Status: DISCONTINUED | OUTPATIENT
Start: 2023-04-14 | End: 2023-04-17

## 2023-04-14 RX ADMIN — Medication 250 MILLIGRAM(S): at 16:06

## 2023-04-14 RX ADMIN — CLOPIDOGREL BISULFATE 75 MILLIGRAM(S): 75 TABLET, FILM COATED ORAL at 11:19

## 2023-04-14 RX ADMIN — SENNA PLUS 2 TABLET(S): 8.6 TABLET ORAL at 21:59

## 2023-04-14 RX ADMIN — Medication 10 UNIT(S): at 16:29

## 2023-04-14 RX ADMIN — Medication 50 MILLIGRAM(S): at 05:51

## 2023-04-14 RX ADMIN — Medication 100 MILLIGRAM(S): at 22:00

## 2023-04-14 RX ADMIN — Medication 2: at 08:14

## 2023-04-14 RX ADMIN — PANTOPRAZOLE SODIUM 40 MILLIGRAM(S): 20 TABLET, DELAYED RELEASE ORAL at 05:51

## 2023-04-14 RX ADMIN — Medication 100 MILLIGRAM(S): at 09:36

## 2023-04-14 RX ADMIN — Medication 6: at 16:29

## 2023-04-14 RX ADMIN — Medication 60 MILLIGRAM(S): at 05:51

## 2023-04-14 RX ADMIN — CEFEPIME 100 MILLIGRAM(S): 1 INJECTION, POWDER, FOR SOLUTION INTRAMUSCULAR; INTRAVENOUS at 08:44

## 2023-04-14 RX ADMIN — Medication 1 TABLET(S): at 11:19

## 2023-04-14 RX ADMIN — Medication 6: at 13:00

## 2023-04-14 RX ADMIN — Medication 81 MILLIGRAM(S): at 11:18

## 2023-04-14 RX ADMIN — INSULIN GLARGINE 22 UNIT(S): 100 INJECTION, SOLUTION SUBCUTANEOUS at 21:59

## 2023-04-14 RX ADMIN — CILOSTAZOL 100 MILLIGRAM(S): 100 TABLET ORAL at 05:51

## 2023-04-14 RX ADMIN — ISOSORBIDE MONONITRATE 30 MILLIGRAM(S): 60 TABLET, EXTENDED RELEASE ORAL at 11:19

## 2023-04-14 RX ADMIN — SODIUM CHLORIDE 75 MILLILITER(S): 9 INJECTION INTRAMUSCULAR; INTRAVENOUS; SUBCUTANEOUS at 11:20

## 2023-04-14 RX ADMIN — Medication 8 UNIT(S): at 13:01

## 2023-04-14 RX ADMIN — ATORVASTATIN CALCIUM 40 MILLIGRAM(S): 80 TABLET, FILM COATED ORAL at 21:59

## 2023-04-14 RX ADMIN — CILOSTAZOL 100 MILLIGRAM(S): 100 TABLET ORAL at 17:20

## 2023-04-14 RX ADMIN — Medication 100 MILLIGRAM(S): at 17:20

## 2023-04-14 RX ADMIN — Medication 8 UNIT(S): at 08:15

## 2023-04-14 RX ADMIN — CHLORHEXIDINE GLUCONATE 1 APPLICATION(S): 213 SOLUTION TOPICAL at 11:33

## 2023-04-14 NOTE — PROGRESS NOTE ADULT - PROBLEM SELECTOR PLAN 6
- c/w ASA + Plavix  - c/w statin  - c/w metoprolol XL 50mg daily  - c/w imdur 30mg daily HbA1c 5.5%  - c/w sliding scale  - increase lantus to 22 units qHS given patient will be NPO after midnight  - increase pre-meal admelog to 10 units TID qAC, hold if NPO  - monitor FS qAC + qHS

## 2023-04-14 NOTE — PROGRESS NOTE ADULT - PROBLEM SELECTOR PLAN 5
HbA1c 5.5%  - c/w sliding scale  - increase lantus to 22 units qHS given patient will be NPO after midnight  - increase pre-meal admelog to 10 units TID qAC, hold if NPO  - monitor FS qAC + qHS Unclear baseline.   - Cr overall stable/slightly uptrended today  - renal ultrasound with no hydro  - Nephrology consulted, recs appreciated  - IV hydration as above  - renally dose meds to GFR, avoid nephrotoxic agents  - monitor Cr on BMP

## 2023-04-14 NOTE — PROGRESS NOTE ADULT - SUBJECTIVE AND OBJECTIVE BOX
DATE OF SERVICE: 04-14-23 @ 12:11    Patient is a 72y old  Male who presents with a chief complaint of Transferred from [facility] for possible vascular intervention. (14 Apr 2023 09:14)      INTERVAL HISTORY: Feels ok.     REVIEW OF SYSTEMS:  CONSTITUTIONAL: No weakness  EYES/ENT: No visual changes;  No throat pain   NECK: No pain or stiffness  RESPIRATORY: No cough, wheezing; No shortness of breath  CARDIOVASCULAR: No chest pain or palpitations  GASTROINTESTINAL: No abdominal  pain. No nausea, vomiting, or hematemesis  GENITOURINARY: No dysuria, frequency or hematuria  NEUROLOGICAL: No stroke like symptoms  SKIN: No rashes    TELEMETRY Personally reviewed: -130s  	  MEDICATIONS:  isosorbide   mononitrate ER Tablet (IMDUR) 30 milliGRAM(s) Oral daily  metoprolol succinate ER 50 milliGRAM(s) Oral daily  NIFEdipine XL 60 milliGRAM(s) Oral daily        PHYSICAL EXAM:  T(C): 37.4 (04-14-23 @ 11:17), Max: 37.4 (04-14-23 @ 11:17)  HR: 123 (04-14-23 @ 11:17) (114 - 123)  BP: 147/80 (04-14-23 @ 11:17) (147/80 - 179/64)  RR: 18 (04-14-23 @ 11:17) (18 - 18)  SpO2: 97% (04-14-23 @ 11:17) (96% - 100%)  Wt(kg): --  I&O's Summary    13 Apr 2023 07:01  -  14 Apr 2023 07:00  --------------------------------------------------------  IN: 620 mL / OUT: 150 mL / NET: 470 mL          Appearance: In no distress	  HEENT:    PERRL, EOMI	  Cardiovascular:  S1 S2, No JVD  Respiratory: Lungs clear to auscultation	  Gastrointestinal:  Soft, Non-tender, + BS	  Vascularature:  No edema of LE  Psychiatric: Appropriate affect   Neuro: no acute focal deficits                               8.1    16.19 )-----------( 222      ( 14 Apr 2023 06:12 )             23.6     04-14    139  |  106  |  49<H>  ----------------------------<  179<H>  4.4   |  22  |  2.62<H>    Ca    8.8      14 Apr 2023 06:12  Phos  3.9     04-14  Mg     2.1     04-14          Labs personally reviewed      ASSESSMENT/PLAN: 	    73 y/o M--patient with a history of type 2 DM complicated with CKD4, PAD with unclear past stenting hx in the RLE, essential HTN tobacco use,  transferred from [facility] 3/30/23 with spouse reporting the patient had lost his R hallux toenail about a week prior to that admission and noted dark discoloration of the R hallux and 2nd toe with foul odor emanating from the R foot    Plan of Care:    #Pre-op risk stratification  - S/p peripheral angio 4/7/2023 revealing  of SFA bilaterally s/p DCBA and stenting of R SFA. Tolerated procedure well.   - Plan for stent to L SFA Monday 4/10 with vascular team.   - Pt displays no evidence of coronary ischemia  - TTE shows normal LV systolic function with no significant structural abnormalities.   - From a cardiac standpoint, Pt is optimized for vascular intervention  - Pt is intermediate to moderate risk for desired procedure  - Further recommendations to follow pending clinical course.   - s/p partial amputation of both 1st metatarsals 4/12-- tolerated surgery well    #PAD  - Continue Cilostazol, and DAPT therapy  - Statin therapy  - Vascular input appreciated    #CKD  - Renal input appreciated    #CAD  - S/p PCI   -Continue Plavix, statin and BB    # Sinus Tachycardia  - HR elevated 100-120  - Possibly pre-renal post operatively vs reactive to infection (ID following) --> agree with trial if IVF  - Patient asymptomatic. Will cont to monitor on tele.  - If HR remains elevated, consider D-Dimer, B/L LE US r/o DVT, VQ scan        Lalitha Jefferson, AG-NP   Cedric Liu DO [facility]  Cardiovascular Medicine  [address], [address]  Available through call or text on [employer] TEAM[employer]  Office: [phone]   DATE OF SERVICE: 04-14-23 @ 12:11    Patient is a 72y old  Male who presents with a chief complaint of Transferred from [facility] for possible vascular intervention. (14 Apr 2023 09:14)      INTERVAL HISTORY: Feels ok.     REVIEW OF SYSTEMS:  CONSTITUTIONAL: No weakness  EYES/ENT: No visual changes;  No throat pain   NECK: No pain or stiffness  RESPIRATORY: No cough, wheezing; No shortness of breath  CARDIOVASCULAR: No chest pain or palpitations  GASTROINTESTINAL: No abdominal  pain. No nausea, vomiting, or hematemesis  GENITOURINARY: No dysuria, frequency or hematuria  NEUROLOGICAL: No stroke like symptoms  SKIN: No rashes    TELEMETRY Personally reviewed: -130s  	  MEDICATIONS:  isosorbide   mononitrate ER Tablet (IMDUR) 30 milliGRAM(s) Oral daily  metoprolol succinate ER 50 milliGRAM(s) Oral daily  NIFEdipine XL 60 milliGRAM(s) Oral daily        PHYSICAL EXAM:  T(C): 37.4 (04-14-23 @ 11:17), Max: 37.4 (04-14-23 @ 11:17)  HR: 123 (04-14-23 @ 11:17) (114 - 123)  BP: 147/80 (04-14-23 @ 11:17) (147/80 - 179/64)  RR: 18 (04-14-23 @ 11:17) (18 - 18)  SpO2: 97% (04-14-23 @ 11:17) (96% - 100%)  Wt(kg): --  I&O's Summary    13 Apr 2023 07:01  -  14 Apr 2023 07:00  --------------------------------------------------------  IN: 620 mL / OUT: 150 mL / NET: 470 mL          Appearance: In no distress	  HEENT:    PERRL, EOMI	  Cardiovascular:  S1 S2, No JVD  Respiratory: Lungs clear to auscultation	  Gastrointestinal:  Soft, Non-tender, + BS	  Vascularature:  No edema of LE  Psychiatric: Appropriate affect   Neuro: no acute focal deficits                               8.1    16.19 )-----------( 222      ( 14 Apr 2023 06:12 )             23.6     04-14    139  |  106  |  49<H>  ----------------------------<  179<H>  4.4   |  22  |  2.62<H>    Ca    8.8      14 Apr 2023 06:12  Phos  3.9     04-14  Mg     2.1     04-14          Labs personally reviewed      ASSESSMENT/PLAN: 	    71 y/o M--patient with a history of type 2 DM complicated with CKD4, PAD with unclear past stenting hx in the RLE, essential HTN tobacco use,  transferred from [facility] 3/30/23 with spouse reporting the patient had lost his R hallux toenail about a week prior to that admission and noted dark discoloration of the R hallux and 2nd toe with foul odor emanating from the R foot    Plan of Care:    #Pre-op risk stratification  - S/p peripheral angio 4/7/2023 revealing  of SFA bilaterally s/p DCBA and stenting of R SFA. Tolerated procedure well.   - Plan for stent to L SFA Monday 4/10 with vascular team.   - Pt displays no evidence of coronary ischemia  - TTE shows normal LV systolic function with no significant structural abnormalities.   - From a cardiac standpoint, Pt is optimized for vascular intervention  - Pt is intermediate to moderate risk for desired procedure  - Further recommendations to follow pending clinical course.   - s/p partial amputation of both 1st metatarsals 4/12-- tolerated surgery well    #PAD  - Continue Cilostazol, and DAPT therapy  - Statin therapy  - Vascular input appreciated    #CKD  - Renal input appreciated    #CAD  - S/p PCI   -Continue Plavix, statin and BB    # Sinus Tachycardia  - HR elevated 100-120  - Possibly pre-renal post operatively vs reactive to infection (ID following) --> agree with trial if IVF  - Patient asymptomatic. Will cont to monitor on tele.  - If HR remains elevated, consider D-Dimer, B/L LE US r/o DVT, VQ scan        Lalitha Jefferson, AG-NP   Cedric Liu DO [facility]  Cardiovascular Medicine  [address], [address]  Available through call or text on [employer] TEAMs  Office: [phone]

## 2023-04-14 NOTE — PROGRESS NOTE ADULT - SUBJECTIVE AND OBJECTIVE BOX
ADDIS ARUAZ 72y [id number]    Patient is a 72y old  Male who presents with a chief complaint of Transferred from [facility] for possible vascular intervention. (2023 12:41)      Follow Up/CC:  ID following for fever     Interval History/ROS: no fever, some foot pain    Allergies    penicillin (Unknown)    Intolerances        ANTIMICROBIALS:  cefepime   IVPB 1000 every 24 hours  metroNIDAZOLE  IVPB 500 every 12 hours      MEDICATIONS  (STANDING):  aspirin enteric coated 81 milliGRAM(s) Oral daily  atorvastatin 40 milliGRAM(s) Oral at bedtime  cefepime   IVPB 1000 milliGRAM(s) IV Intermittent every 24 hours  chlorhexidine 2% Cloths 1 Application(s) Topical daily  cilostazol 100 milliGRAM(s) Oral two times a day  clopidogrel Tablet 75 milliGRAM(s) Oral daily  dextrose 5%. 1000 milliLiter(s) (50 mL/Hr) IV Continuous <Continuous>  dextrose 5%. 1000 milliLiter(s) (100 mL/Hr) IV Continuous <Continuous>  dextrose 50% Injectable 25 Gram(s) IV Push once  dextrose 50% Injectable 12.5 Gram(s) IV Push once  glucagon  Injectable 1 milliGRAM(s) IntraMuscular once  insulin glargine Injectable (LANTUS) 22 Unit(s) SubCutaneous at bedtime  insulin lispro (ADMELOG) corrective regimen sliding scale   SubCutaneous three times a day before meals  insulin lispro (ADMELOG) corrective regimen sliding scale   SubCutaneous at bedtime  insulin lispro Injectable (ADMELOG) 10 Unit(s) SubCutaneous three times a day before meals  isosorbide   mononitrate ER Tablet (IMDUR) 30 milliGRAM(s) Oral daily  metoprolol succinate ER 50 milliGRAM(s) Oral daily  metroNIDAZOLE  IVPB 500 milliGRAM(s) IV Intermittent every 12 hours  Nephro-dorita 1 Tablet(s) Oral daily  NIFEdipine XL 60 milliGRAM(s) Oral daily  pantoprazole    Tablet 40 milliGRAM(s) Oral before breakfast  senna 2 Tablet(s) Oral at bedtime  sodium chloride 0.9%. 1000 milliLiter(s) (75 mL/Hr) IV Continuous <Continuous>    MEDICATIONS  (PRN):  acetaminophen  300 mG/codeine 30 mG 1 Tablet(s) Oral every 4 hours PRN Mild Pain (1 - 3)  dextrose Oral Gel 15 Gram(s) Oral once PRN Blood Glucose LESS THAN 70 milliGRAM(s)/deciliter  oxycodone    5 mG/acetaminophen 325 mG 1 Tablet(s) Oral every 6 hours PRN Severe Pain (7 - 10)        Vital Signs Last 24 Hrs  T(C): 37.4 (2023 11:17), Max: 37.4 (2023 11:17)  T(F): 99.4 (2023 11:17), Max: 99.4 (2023 11:17)  HR: 123 (2023 11:17) (114 - 123)  BP: 147/80 (2023 11:17) (147/80 - 179/64)  BP(mean): --  RR: 18 (2023 11:17) (18 - 18)  SpO2: 97% (2023 11:17) (96% - 100%)    Parameters below as of 2023 11:17  Patient On (Oxygen Delivery Method): room air        CBC Full  -  ( 2023 06:12 )  WBC Count : 16.19 K/uL  RBC Count : 3.08 M/uL  Hemoglobin : 8.1 g/dL  Hematocrit : 23.6 %  Platelet Count - Automated : 222 K/uL  Mean Cell Volume : 76.6 fl  Mean Cell Hemoglobin : 26.3 pg  Mean Cell Hemoglobin Concentration : 34.3 gm/dL  Auto Neutrophil # : 13.12 K/uL  Auto Lymphocyte # : 1.52 K/uL  Auto Monocyte # : 1.28 K/uL  Auto Eosinophil # : 0.11 K/uL  Auto Basophil # : 0.05 K/uL  Auto Neutrophil % : 81.0 %  Auto Lymphocyte % : 9.4 %  Auto Monocyte % : 7.9 %  Auto Eosinophil % : 0.7 %  Auto Basophil % : 0.3 %    -14    139  |  106  |  49<H>  ----------------------------<  179<H>  4.4   |  22  |  2.62<H>    Ca    8.8      2023 06:12  Phos  3.9     04-14  Mg     2.1     04-14        Urinalysis Basic - ( 2023 13:02 )    Color: Light Yellow / Appearance: Clear / S.015 / pH: x  Gluc: x / Ketone: Negative  / Bili: Negative / Urobili: <2 mg/dL   Blood: x / Protein: Trace / Nitrite: Negative   Leuk Esterase: Negative / RBC: x / WBC x   Sq Epi: x / Non Sq Epi: x / Bacteria: x        MICROBIOLOGY:  .Blood Blood-Peripheral  23   No growth to date.  --  --      .Tissue Other  23   Rare Staphylococcus simulans  --    Few polymorphonuclear leukocytes per low power field  Few Gram positive cocci in pairs per oil power field      .Surgical Swab right foot deep wound  23   Rare Citrobacter freundii complex  Rare Coag Negative Staphylococcus  --  --      .Surgical Swab left foot deep wound  23   No growth to date.  --  --      .Tissue Other  23   No growth  --    No polymorphonuclear cells seen per low power field  Rare Gram positive cocci in pairs per oil power field      .Abscess Left foot  23   Few Citrobacter freundii complex  Rare Staphylococcus simulans "Susceptibilities not performed"  Few Helcococcus kunzii "Susceptibilities not performed"  Rare Porphyromonas somerae "Susceptibilities not performed"  --  Citrobacter freundii complex          RADIOLOGY    < from: Xray Chest 1 View- PORTABLE-Urgent (Xray Chest 1 View- PORTABLE-Urgent .) (23 @ 09:40) >  Clear lungs.    < end of copied text >

## 2023-04-14 NOTE — PROGRESS NOTE ADULT - PROBLEM SELECTOR PLAN 2
VA arterial duplex showing mod severe disease.   - s/p peripheral angio 4/7/2023 revealing  of SFA bilaterally s/p DCBA and stenting of R SFA  - Vascular Cardiology consult appreciated  - patient with known PAD with CLI [place] Classification 6 with bilateral  of the SFAs,   - s/p DCBA and stent to the distal R SFA 4/7/23   - now s/p stent to L SFA on 4/10  - c/w DAPT, cilostazol uptrending HR with tele showing sinus tachycardia  - suspecting 2/2 to recent fever/infection and pre-renal etiology  - plan for additional IV hydration today as appears hypovolemic on exam  - continue telemetry monitoring

## 2023-04-14 NOTE — PROVIDER CONTACT NOTE (OTHER) - ACTION/TREATMENT ORDERED:
no new order, will continue iv abx at this time
Provider aware, continue to monitor on cardiac monitor, will check lytes with am labs

## 2023-04-14 NOTE — PROVIDER CONTACT NOTE (OTHER) - SITUATION
5 beats WCT. Pt was getting cleaned up during ectopy. Asymptomatic. First time having beats.
abnormal tissue cx resulted; [facility] lab notified

## 2023-04-14 NOTE — PROVIDER CONTACT NOTE (OTHER) - BACKGROUND
Dx:  Chronic Osteomyelitis - R 2nd toe dry gangrene and L hallux OM - s/p Azactam & IV Vancomycin, wound c/s grew Citrobacter, staph & helcococcus
s/p bilateral toe resections

## 2023-04-14 NOTE — PROGRESS NOTE ADULT - ASSESSMENT
71 yo male with PMH of CKD4, PAD s/p stent, CAD, HTN, T2DM, current smoker who p/w LE dry gangrene and osteomyelitis now s/p R SFA stent on 4/7 and L SFA stent on 4/10 now s/p b/l partial 1st and 2nd ray resection on 4/12/23 with course c/b fever on 4/13 now resolved current on IV abx pending finalization of OR cx.

## 2023-04-14 NOTE — PROGRESS NOTE ADULT - ASSESSMENT
73 y/o M--patient with a history of type 2 DM complicated with CKD4, PAD with past stenting RLE, essential HTN, apparently still active cigarette use until one week ago, UNVACCINATED against COVID-19, with initial admission to [facility] 3/30/23 in the setting of patient with mild cognitive impairment, with spouse reporting the patient had lost his RIGHT hallux toenail about a week prior to that admission and noted dark discoloration of the RIGHT hallux and 2nd toe with foul odor emanating from the RIGHT foot+ left foot gangrene    Miguel Galicia  Attending Physician   Division of Infectious Disease  Office #[phone]  Available on [employer] Teams also  After 5pm/weekend or no response, call #[phone]

## 2023-04-14 NOTE — PROGRESS NOTE ADULT - SUBJECTIVE AND OBJECTIVE BOX
Samara Sung MD  Division of Hospital Medicine  [facility]   Available on Microsoft Teams (Mon-Fri 8am-5pm)    * messages preferred prior to calls  Other Times:  [phone]      Patient is a 72y old  Male who presents with a chief complaint of Transferred from [facility] for possible vascular intervention. (2023 12:41)      SUBJECTIVE / OVERNIGHT EVENTS: no acute events overnight. afebrile in last 24 hours. feels well without complaints. no fever, chills, chest pain, cough, abd pain, n/v, nor dysuria. minimal pain post-op.   ADDITIONAL REVIEW OF SYSTEMS:    Tele reviewed: SR/-120s    MEDICATIONS  (STANDING):  aspirin enteric coated 81 milliGRAM(s) Oral daily  atorvastatin 40 milliGRAM(s) Oral at bedtime  chlorhexidine 2% Cloths 1 Application(s) Topical daily  cilostazol 100 milliGRAM(s) Oral two times a day  ciprofloxacin   IVPB 200 milliGRAM(s) IV Intermittent every 12 hours  clopidogrel Tablet 75 milliGRAM(s) Oral daily  dextrose 5%. 1000 milliLiter(s) (50 mL/Hr) IV Continuous <Continuous>  dextrose 5%. 1000 milliLiter(s) (100 mL/Hr) IV Continuous <Continuous>  dextrose 50% Injectable 25 Gram(s) IV Push once  dextrose 50% Injectable 12.5 Gram(s) IV Push once  glucagon  Injectable 1 milliGRAM(s) IntraMuscular once  insulin glargine Injectable (LANTUS) 22 Unit(s) SubCutaneous at bedtime  insulin lispro (ADMELOG) corrective regimen sliding scale   SubCutaneous three times a day before meals  insulin lispro (ADMELOG) corrective regimen sliding scale   SubCutaneous at bedtime  insulin lispro Injectable (ADMELOG) 10 Unit(s) SubCutaneous three times a day before meals  isosorbide   mononitrate ER Tablet (IMDUR) 30 milliGRAM(s) Oral daily  metoprolol succinate ER 50 milliGRAM(s) Oral daily  metroNIDAZOLE  IVPB 500 milliGRAM(s) IV Intermittent every 12 hours  Nephro-dorita 1 Tablet(s) Oral daily  NIFEdipine XL 60 milliGRAM(s) Oral daily  pantoprazole    Tablet 40 milliGRAM(s) Oral before breakfast  senna 2 Tablet(s) Oral at bedtime  sodium chloride 0.9%. 1000 milliLiter(s) (75 mL/Hr) IV Continuous <Continuous>  vancomycin  IVPB 1000 milliGRAM(s) IV Intermittent once    MEDICATIONS  (PRN):  acetaminophen  300 mG/codeine 30 mG 1 Tablet(s) Oral every 4 hours PRN Mild Pain (1 - 3)  dextrose Oral Gel 15 Gram(s) Oral once PRN Blood Glucose LESS THAN 70 milliGRAM(s)/deciliter  oxycodone    5 mG/acetaminophen 325 mG 1 Tablet(s) Oral every 6 hours PRN Severe Pain (7 - 10)      CAPILLARY BLOOD GLUCOSE      POCT Blood Glucose.: 288 mg/dL (2023 12:20)  POCT Blood Glucose.: 175 mg/dL (2023 07:52)  POCT Blood Glucose.: 210 mg/dL (2023 21:26)  POCT Blood Glucose.: 159 mg/dL (2023 16:45)    I&O's Summary    2023 07:01  -  2023 07:00  --------------------------------------------------------  IN: 620 mL / OUT: 150 mL / NET: 470 mL        PHYSICAL EXAM:  Vital Signs Last 24 Hrs  T(C): 37.4 (2023 11:17), Max: 37.4 (2023 11:17)  T(F): 99.4 (2023 11:17), Max: 99.4 (2023 11:17)  HR: 123 (2023 11:17) (114 - 123)  BP: 147/80 (2023 11:17) (147/80 - 179/64)  BP(mean): --  RR: 18 (2023 11:17) (18 - 18)  SpO2: 97% (2023 11:17) (96% - 100%)    Parameters below as of 2023 11:17  Patient On (Oxygen Delivery Method): room air    CONSTITUTIONAL: NAD, well-developed, well-groomed  EYES: PERRLA; conjunctiva and sclera clear  ENMT: Moist oral mucosa, no pharyngeal injection or exudates; normal dentition  NECK: Supple, no palpable masses; no thyromegaly  RESPIRATORY: Normal respiratory effort; lungs are clear to auscultation bilaterally  CARDIOVASCULAR: Regular rate and rhythm, normal S1 and S2, no murmur/rub/gallop; No lower extremity edema  ABDOMEN: Soft, Nondistended, Nontender to palpation, normoactive bowel sounds  MUSCULOSKELETAL: No clubbing or cyanosis of digits; b/l feet in post-op dressing c/d/i  PSYCH: A+O to person, place, and time; affect appropriate  NEUROLOGY: CN 2-12 are intact and symmetric; no gross sensory deficits   SKIN: No rashes; no palpable lesions, b/l venous stasis changes, b/l groin sites c/d/i     LABS:                        8.1    16.19 )-----------( 222      ( 2023 06:12 )             23.6     04-14    139  |  106  |  49<H>  ----------------------------<  179<H>  4.4   |  22  |  2.62<H>    Ca    8.8      2023 06:12  Phos  3.9     04-14  Mg     2.1     04-14        Urinalysis Basic - ( 2023 13:02 )    Color: Light Yellow / Appearance: Clear / S.015 / pH: x  Gluc: x / Ketone: Negative  / Bili: Negative / Urobili: <2 mg/dL   Blood: x / Protein: Trace / Nitrite: Negative   Leuk Esterase: Negative / RBC: x / WBC x   Sq Epi: x / Non Sq Epi: x / Bacteria: x        Culture - Blood (collected 2023 07:34)  Source: .Blood Blood-Peripheral  Preliminary Report (2023 12:34):    No growth to date.    Culture - Blood (collected 2023 07:34)  Source: .Blood Blood-Peripheral  Preliminary Report (2023 12:34):    No growth to date.    Culture - Tissue with Gram Stain (collected 2023 13:46)  Source: .Tissue Other  Gram Stain (2023 22:00):    Few polymorphonuclear leukocytes per low power field    Few Gram positive cocci in pairs per oil power field  Preliminary Report (2023 15:44):    Rare Staphylococcus simulans    Culture - Surgical Swab (collected 2023 13:44)  Source: .Surgical Swab right foot deep wound  Preliminary Report (2023 19:56):    Rare Citrobacter freundii complex    Rare Coag Negative Staphylococcus    Culture - Surgical Swab (collected 2023 13:40)  Source: .Surgical Swab left foot deep wound  Preliminary Report (2023 15:54):    No growth to date.    Culture - Tissue with Gram Stain (collected 2023 13:36)  Source: .Tissue Other  Gram Stain (2023 22:41):    No polymorphonuclear cells seen per low power field    Rare Gram positive cocci in pairs per oil power field  Preliminary Report (2023 14:51):    No growth      RADIOLOGY & ADDITIONAL TESTS:  Results Reviewed: OR cx growing citrobacter freundii, rare caog neg staph, staph simulans  Imaging Personally Reviewed:  Electrocardiogram Personally Reviewed:    COORDINATION OF CARE:  Care Discussed with Consultants/Other Providers [Y]: ID Attending Dr. Galicia, medicine VINAYAK Carter  Prior or Outpatient Records Reviewed [Y/N]:   Samara Sung MD  Division of Hospital Medicine  [facility]   Available on Microsoft Teams (Mon-Fri 8am-5pm)    * messages preferred prior to calls  Other Times:  [phone]      Patient is a 72y old  Male who presents with a chief complaint of Transferred from [facility] for possible vascular intervention. (2023 12:41)      SUBJECTIVE / OVERNIGHT EVENTS: no acute events overnight. afebrile in last 24 hours. feels well without complaints. no fever, chills, chest pain, palpitations, cough, abd pain, n/v, nor dysuria. minimal pain post-op.   ADDITIONAL REVIEW OF SYSTEMS:    Tele reviewed: SR/-120s    MEDICATIONS  (STANDING):  aspirin enteric coated 81 milliGRAM(s) Oral daily  atorvastatin 40 milliGRAM(s) Oral at bedtime  chlorhexidine 2% Cloths 1 Application(s) Topical daily  cilostazol 100 milliGRAM(s) Oral two times a day  ciprofloxacin   IVPB 200 milliGRAM(s) IV Intermittent every 12 hours  clopidogrel Tablet 75 milliGRAM(s) Oral daily  dextrose 5%. 1000 milliLiter(s) (50 mL/Hr) IV Continuous <Continuous>  dextrose 5%. 1000 milliLiter(s) (100 mL/Hr) IV Continuous <Continuous>  dextrose 50% Injectable 25 Gram(s) IV Push once  dextrose 50% Injectable 12.5 Gram(s) IV Push once  glucagon  Injectable 1 milliGRAM(s) IntraMuscular once  insulin glargine Injectable (LANTUS) 22 Unit(s) SubCutaneous at bedtime  insulin lispro (ADMELOG) corrective regimen sliding scale   SubCutaneous three times a day before meals  insulin lispro (ADMELOG) corrective regimen sliding scale   SubCutaneous at bedtime  insulin lispro Injectable (ADMELOG) 10 Unit(s) SubCutaneous three times a day before meals  isosorbide   mononitrate ER Tablet (IMDUR) 30 milliGRAM(s) Oral daily  metoprolol succinate ER 50 milliGRAM(s) Oral daily  metroNIDAZOLE  IVPB 500 milliGRAM(s) IV Intermittent every 12 hours  Nephro-dorita 1 Tablet(s) Oral daily  NIFEdipine XL 60 milliGRAM(s) Oral daily  pantoprazole    Tablet 40 milliGRAM(s) Oral before breakfast  senna 2 Tablet(s) Oral at bedtime  sodium chloride 0.9%. 1000 milliLiter(s) (75 mL/Hr) IV Continuous <Continuous>  vancomycin  IVPB 1000 milliGRAM(s) IV Intermittent once    MEDICATIONS  (PRN):  acetaminophen  300 mG/codeine 30 mG 1 Tablet(s) Oral every 4 hours PRN Mild Pain (1 - 3)  dextrose Oral Gel 15 Gram(s) Oral once PRN Blood Glucose LESS THAN 70 milliGRAM(s)/deciliter  oxycodone    5 mG/acetaminophen 325 mG 1 Tablet(s) Oral every 6 hours PRN Severe Pain (7 - 10)      CAPILLARY BLOOD GLUCOSE      POCT Blood Glucose.: 288 mg/dL (2023 12:20)  POCT Blood Glucose.: 175 mg/dL (2023 07:52)  POCT Blood Glucose.: 210 mg/dL (2023 21:26)  POCT Blood Glucose.: 159 mg/dL (2023 16:45)    I&O's Summary    2023 07:01  -  2023 07:00  --------------------------------------------------------  IN: 620 mL / OUT: 150 mL / NET: 470 mL        PHYSICAL EXAM:  Vital Signs Last 24 Hrs  T(C): 37.4 (2023 11:17), Max: 37.4 (2023 11:17)  T(F): 99.4 (2023 11:17), Max: 99.4 (2023 11:17)  HR: 123 (2023 11:17) (114 - 123)  BP: 147/80 (2023 11:17) (147/80 - 179/64)  BP(mean): --  RR: 18 (2023 11:17) (18 - 18)  SpO2: 97% (2023 11:17) (96% - 100%)    Parameters below as of 2023 11:17  Patient On (Oxygen Delivery Method): room air    CONSTITUTIONAL: NAD, well-developed, well-groomed  EYES: PERRLA; conjunctiva and sclera clear  ENMT: Moist oral mucosa, no pharyngeal injection or exudates; normal dentition  NECK: Supple, no palpable masses; no thyromegaly  RESPIRATORY: Normal respiratory effort; lungs are clear to auscultation bilaterally  CARDIOVASCULAR: Regular rate and rhythm, normal S1 and S2, no murmur/rub/gallop; No lower extremity edema  ABDOMEN: Soft, Nondistended, Nontender to palpation, normoactive bowel sounds  MUSCULOSKELETAL: No clubbing or cyanosis of digits; b/l feet in post-op dressing c/d/i  PSYCH: A+O to person, place, and time; affect appropriate  NEUROLOGY: CN 2-12 are intact and symmetric; no gross sensory deficits   SKIN: No rashes; no palpable lesions, b/l venous stasis changes, b/l groin sites c/d/i     LABS:                        8.1    16.19 )-----------( 222      ( 2023 06:12 )             23.6     04-14    139  |  106  |  49<H>  ----------------------------<  179<H>  4.4   |  22  |  2.62<H>    Ca    8.8      2023 06:12  Phos  3.9     04-14  Mg     2.1     04-14        Urinalysis Basic - ( 2023 13:02 )    Color: Light Yellow / Appearance: Clear / S.015 / pH: x  Gluc: x / Ketone: Negative  / Bili: Negative / Urobili: <2 mg/dL   Blood: x / Protein: Trace / Nitrite: Negative   Leuk Esterase: Negative / RBC: x / WBC x   Sq Epi: x / Non Sq Epi: x / Bacteria: x        Culture - Blood (collected 2023 07:34)  Source: .Blood Blood-Peripheral  Preliminary Report (2023 12:34):    No growth to date.    Culture - Blood (collected 2023 07:34)  Source: .Blood Blood-Peripheral  Preliminary Report (2023 12:34):    No growth to date.    Culture - Tissue with Gram Stain (collected 2023 13:46)  Source: .Tissue Other  Gram Stain (2023 22:00):    Few polymorphonuclear leukocytes per low power field    Few Gram positive cocci in pairs per oil power field  Preliminary Report (2023 15:44):    Rare Staphylococcus simulans    Culture - Surgical Swab (collected 2023 13:44)  Source: .Surgical Swab right foot deep wound  Preliminary Report (2023 19:56):    Rare Citrobacter freundii complex    Rare Coag Negative Staphylococcus    Culture - Surgical Swab (collected 2023 13:40)  Source: .Surgical Swab left foot deep wound  Preliminary Report (2023 15:54):    No growth to date.    Culture - Tissue with Gram Stain (collected 2023 13:36)  Source: .Tissue Other  Gram Stain (2023 22:41):    No polymorphonuclear cells seen per low power field    Rare Gram positive cocci in pairs per oil power field  Preliminary Report (2023 14:51):    No growth      RADIOLOGY & ADDITIONAL TESTS:  Results Reviewed: OR cx growing citrobacter freundii, rare caog neg staph, staph simulans  Imaging Personally Reviewed:  Electrocardiogram Personally Reviewed:    COORDINATION OF CARE:  Care Discussed with Consultants/Other Providers [Y]: ID Attending Dr. Galicia, medicine VINAYAK Carter  Prior or Outpatient Records Reviewed [Y/N]:   Samara Sung MD  Division of Hospital Medicine  [facility]   Available on Microsoft Teams (Mon-Fri 8am-5pm)    * messages preferred prior to calls  Other Times:  [phone]      Patient is a 72y old  Male who presents with a chief complaint of Transferred from [facility] for possible vascular intervention. (2023 12:41)      SUBJECTIVE / OVERNIGHT EVENTS: no acute events overnight. afebrile in last 24 hours. feels well without complaints. no fever, chills, chest pain, palpitations, cough, abd pain, n/v, nor dysuria. minimal pain post-op.   ADDITIONAL REVIEW OF SYSTEMS:    Tele reviewed: SR/-120s    MEDICATIONS  (STANDING):  aspirin enteric coated 81 milliGRAM(s) Oral daily  atorvastatin 40 milliGRAM(s) Oral at bedtime  chlorhexidine 2% Cloths 1 Application(s) Topical daily  cilostazol 100 milliGRAM(s) Oral two times a day  ciprofloxacin   IVPB 200 milliGRAM(s) IV Intermittent every 12 hours  clopidogrel Tablet 75 milliGRAM(s) Oral daily  dextrose 5%. 1000 milliLiter(s) (50 mL/Hr) IV Continuous <Continuous>  dextrose 5%. 1000 milliLiter(s) (100 mL/Hr) IV Continuous <Continuous>  dextrose 50% Injectable 25 Gram(s) IV Push once  dextrose 50% Injectable 12.5 Gram(s) IV Push once  glucagon  Injectable 1 milliGRAM(s) IntraMuscular once  insulin glargine Injectable (LANTUS) 22 Unit(s) SubCutaneous at bedtime  insulin lispro (ADMELOG) corrective regimen sliding scale   SubCutaneous three times a day before meals  insulin lispro (ADMELOG) corrective regimen sliding scale   SubCutaneous at bedtime  insulin lispro Injectable (ADMELOG) 10 Unit(s) SubCutaneous three times a day before meals  isosorbide   mononitrate ER Tablet (IMDUR) 30 milliGRAM(s) Oral daily  metoprolol succinate ER 50 milliGRAM(s) Oral daily  metroNIDAZOLE  IVPB 500 milliGRAM(s) IV Intermittent every 12 hours  Nephro-dorita 1 Tablet(s) Oral daily  NIFEdipine XL 60 milliGRAM(s) Oral daily  pantoprazole    Tablet 40 milliGRAM(s) Oral before breakfast  senna 2 Tablet(s) Oral at bedtime  sodium chloride 0.9%. 1000 milliLiter(s) (75 mL/Hr) IV Continuous <Continuous>  vancomycin  IVPB 1000 milliGRAM(s) IV Intermittent once    MEDICATIONS  (PRN):  acetaminophen  300 mG/codeine 30 mG 1 Tablet(s) Oral every 4 hours PRN Mild Pain (1 - 3)  dextrose Oral Gel 15 Gram(s) Oral once PRN Blood Glucose LESS THAN 70 milliGRAM(s)/deciliter  oxycodone    5 mG/acetaminophen 325 mG 1 Tablet(s) Oral every 6 hours PRN Severe Pain (7 - 10)      CAPILLARY BLOOD GLUCOSE      POCT Blood Glucose.: 288 mg/dL (2023 12:20)  POCT Blood Glucose.: 175 mg/dL (2023 07:52)  POCT Blood Glucose.: 210 mg/dL (2023 21:26)  POCT Blood Glucose.: 159 mg/dL (2023 16:45)    I&O's Summary    2023 07:01  -  2023 07:00  --------------------------------------------------------  IN: 620 mL / OUT: 150 mL / NET: 470 mL        PHYSICAL EXAM:  Vital Signs Last 24 Hrs  T(C): 37.4 (2023 11:17), Max: 37.4 (2023 11:17)  T(F): 99.4 (2023 11:17), Max: 99.4 (2023 11:17)  HR: 123 (2023 11:17) (114 - 123)  BP: 147/80 (2023 11:17) (147/80 - 179/64)  BP(mean): --  RR: 18 (2023 11:17) (18 - 18)  SpO2: 97% (2023 11:17) (96% - 100%)    Parameters below as of 2023 11:17  Patient On (Oxygen Delivery Method): room air    CONSTITUTIONAL: NAD, well-developed, well-groomed  EYES: PERRLA; conjunctiva and sclera clear  ENMT: Moist oral mucosa, no pharyngeal injection or exudates; normal dentition  NECK: Supple, no palpable masses; no thyromegaly  RESPIRATORY: Normal respiratory effort; lungs are clear to auscultation bilaterally  CARDIOVASCULAR: Regular rate and rhythm, normal S1 and S2, no murmur/rub/gallop; No lower extremity edema  ABDOMEN: Soft, Nondistended, Nontender to palpation, normoactive bowel sounds  MUSCULOSKELETAL: No clubbing or cyanosis of digits; b/l feet in post-op dressing c/d/i  PSYCH: A+O to person, place, and time; affect appropriate  NEUROLOGY: CN 2-12 are intact and symmetric; no gross sensory deficits   SKIN: No rashes; no palpable lesions, b/l venous stasis changes, b/l groin sites c/d/i     LABS:                        8.1    16.19 )-----------( 222      ( 2023 06:12 )             23.6     04-14    139  |  106  |  49<H>  ----------------------------<  179<H>  4.4   |  22  |  2.62<H>    Ca    8.8      2023 06:12  Phos  3.9     04-14  Mg     2.1     04-14        Urinalysis Basic - ( 2023 13:02 )    Color: Light Yellow / Appearance: Clear / S.015 / pH: x  Gluc: x / Ketone: Negative  / Bili: Negative / Urobili: <2 mg/dL   Blood: x / Protein: Trace / Nitrite: Negative   Leuk Esterase: Negative / RBC: x / WBC x   Sq Epi: x / Non Sq Epi: x / Bacteria: x        Culture - Blood (collected 2023 07:34)  Source: .Blood Blood-Peripheral  Preliminary Report (2023 12:34):    No growth to date.    Culture - Blood (collected 2023 07:34)  Source: .Blood Blood-Peripheral  Preliminary Report (2023 12:34):    No growth to date.    Culture - Tissue with Gram Stain (collected 2023 13:46)  Source: .Tissue Other  Gram Stain (2023 22:00):    Few polymorphonuclear leukocytes per low power field    Few Gram positive cocci in pairs per oil power field  Preliminary Report (2023 15:44):    Rare Staphylococcus simulans    Culture - Surgical Swab (collected 2023 13:44)  Source: .Surgical Swab right foot deep wound  Preliminary Report (2023 19:56):    Rare Citrobacter freundii complex    Rare Coag Negative Staphylococcus    Culture - Surgical Swab (collected 2023 13:40)  Source: .Surgical Swab left foot deep wound  Preliminary Report (2023 15:54):    No growth to date.    Culture - Tissue with Gram Stain (collected 2023 13:36)  Source: .Tissue Other  Gram Stain (2023 22:41):    No polymorphonuclear cells seen per low power field    Rare Gram positive cocci in pairs per oil power field  Preliminary Report (2023 14:51):    No growth      RADIOLOGY & ADDITIONAL TESTS:  Results Reviewed: OR cx growing citrobacter freundii, rare coag neg staph, staph simulans  Imaging Personally Reviewed:  Electrocardiogram Personally Reviewed:    COORDINATION OF CARE:  Care Discussed with Consultants/Other Providers [Y]: ID Attending Dr. Galicia, Cardiology Attending Dr. Liu, medicine VINAYAK Carter  Prior or Outpatient Records Reviewed [Y/N]:

## 2023-04-14 NOTE — PROGRESS NOTE ADULT - SUBJECTIVE AND OBJECTIVE BOX
Interval History: Overnight events noted. IV abx for chronic osteomyelitis     Medications:  acetaminophen  300 mG/codeine 30 mG 1 Tablet(s) Oral every 4 hours PRN  aspirin enteric coated 81 milliGRAM(s) Oral daily  atorvastatin 40 milliGRAM(s) Oral at bedtime  cefepime   IVPB 1000 milliGRAM(s) IV Intermittent every 24 hours  chlorhexidine 2% Cloths 1 Application(s) Topical daily  cilostazol 100 milliGRAM(s) Oral two times a day  clopidogrel Tablet 75 milliGRAM(s) Oral daily  dextrose 5%. 1000 milliLiter(s) IV Continuous <Continuous>  dextrose 5%. 1000 milliLiter(s) IV Continuous <Continuous>  dextrose 50% Injectable 25 Gram(s) IV Push once  dextrose 50% Injectable 12.5 Gram(s) IV Push once  dextrose Oral Gel 15 Gram(s) Oral once PRN  glucagon  Injectable 1 milliGRAM(s) IntraMuscular once  insulin glargine Injectable (LANTUS) 18 Unit(s) SubCutaneous at bedtime  insulin lispro (ADMELOG) corrective regimen sliding scale   SubCutaneous three times a day before meals  insulin lispro (ADMELOG) corrective regimen sliding scale   SubCutaneous at bedtime  insulin lispro Injectable (ADMELOG) 8 Unit(s) SubCutaneous three times a day before meals  isosorbide   mononitrate ER Tablet (IMDUR) 30 milliGRAM(s) Oral daily  metoprolol succinate ER 50 milliGRAM(s) Oral daily  metroNIDAZOLE  IVPB 500 milliGRAM(s) IV Intermittent every 12 hours  Nephro-dorita 1 Tablet(s) Oral daily  NIFEdipine XL 60 milliGRAM(s) Oral daily  oxycodone    5 mG/acetaminophen 325 mG 1 Tablet(s) Oral every 6 hours PRN  pantoprazole    Tablet 40 milliGRAM(s) Oral before breakfast  senna 2 Tablet(s) Oral at bedtime  sodium chloride 0.9%. 1000 milliLiter(s) IV Continuous <Continuous>  sodium chloride 0.9%. 1000 milliLiter(s) IV Continuous <Continuous>      Vitals:  T(C): 37.4 (04-14-23 @ 11:17), Max: 37.4 (04-14-23 @ 11:17)  HR: 123 (04-14-23 @ 11:17) (114 - 123)  BP: 147/80 (04-14-23 @ 11:17) (147/80 - 179/64)  RR: 18 (04-14-23 @ 11:17) (18 - 18)  SpO2: 97% (04-14-23 @ 11:17) (96% - 100%)      I&O's Summary    13 Apr 2023 07:01  -  14 Apr 2023 07:00  --------------------------------------------------------  IN: 620 mL / OUT: 150 mL / NET: 470 mL        Appearance: Normal	  HEENT:   Normal oral mucosa, PERRL, EOMI	  Lymphatic: No lymphadenopathy  Cardiovascular: Normal S1 S2, No JVD, No murmurs, No edema  Respiratory: Lungs clear to auscultation	  Psychiatry: A & O x 3, Mood & affect appropriate  Gastrointestinal:  Soft, Non-tender, + BS	  Skin: No rashes, No ecchymoses, No cyanosis	  Neurologic: Non-focal  Vascular Pulse Exam: Audible bilateral DP and PT signals  Foot Exam: recent metatarsal amputation, skin coapted well     Labs:                        8.1    16.19 )-----------( 222      ( 14 Apr 2023 06:12 )             23.6     04-14    139  |  106  |  49<H>  ----------------------------<  179<H>  4.4   |  22  |  2.62<H>    Ca    8.8      14 Apr 2023 06:12  Phos  3.9     04-14  Mg     2.1     04-14                  Lactate, Blood: 0.8 mmol/L (04-13 @ 07:34)

## 2023-04-14 NOTE — PROGRESS NOTE ADULT - PROBLEM SELECTOR PLAN 4
Unclear baseline.   - Cr overall stable/slightly improved today  - renal ultrasound with no hydro  - Nephrology consulted, recs appreciated  - will give IV hydration today as nephrology recs given recent contrast load yesterday  - renally dose meds to GFR, avoid nephrotoxic agents  - monitor Cr on BMP Pt with stay at OSH on iv abx seen by pod and ID there.   - wound cx grew Citrobacter, staph simulans & helcococcus yassine, and poryphyromonas  - now s/p bilateral partial first and second ray resection, closed, EBL 30cc, patient bled moderately on left but inadequately on right, high concern for viability, low concern for residual infection as bone quality at level of resection was hard and adequate with Podiatry on 4/12  - f/u clean bone blood cx obtained in OR 4/12 - growing GPCs  - ID re-consulted re: need for abx on d/c, recs appreciated  - discussed with ID Attending Dr. 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 however with new fever on 4/13. abx plan as above

## 2023-04-14 NOTE — PROGRESS NOTE ADULT - PROBLEM SELECTOR PLAN 1
new fever to 101.7 on 4/13 AM with worsening leukocytosis to 17K. meets sepsis criteria.   - CXR with no evidence of pneumonia  - UA negative  - 4/13 blood cx with NGTD  - 4/12 OR bone cx with GPCs, staph simulans, and citrobacter freundii -  continue to follow  - discussed with ID attending Dr. Galciia on 4/14  - will give vanco 1g x1 now, check vanco level in AM  - stop cefepime and start cipro 200mg IVPB q12h, continue flagyl 500mg q12h  - continue to follow above OR cultures  - start cefepime 1g q24h and flagyl 500mg q12h as per ID recs  - s/p total 1L yesterday. will give additional IV hydration with NS @ 75cc/hr for total 1L today for volume resuscitation

## 2023-04-14 NOTE — PROGRESS NOTE ADULT - ASSESSMENT
72M presents with bilateral foot dry gangrene and ischemic changes.  - Pt seen and evaluated.  - Febrile over night, WBC 16  - 4/12 s/p b/l P12RR: Right foot: skin edges well coapted, warm, no early ischemic changes, sutures intact, no signs of infection. Left foot: well coapted, viable, no early ischemic changes, no signs of infection  - Pe intra-op findings: high concern for viability, low concern for residual bone infection  - OR clean bone culture growing staph simulans, C lissetti, CNS  - ID recs appreciated, start cefepime PO Fagyl  - Pt is s/p b/l LE angiograms: RLE angiogram w/ SFA stent, LLE angio w/ 2-vessel r/o to foot  - Stable from podiatry for discharge pending final ID recs  - Discussed with attending

## 2023-04-14 NOTE — PROVIDER CONTACT NOTE (OTHER) - ASSESSMENT
fever overnight, started on iv abx
Pt A&Ox3-4, room air, VSS and in flowsheet. ST on tele. Denies cp, sob, n/v, palpitations. Pt asymptomatic

## 2023-04-14 NOTE — PROGRESS NOTE ADULT - PROBLEM SELECTOR PLAN 3
Pt with stay at OSH on iv abx seen by pod and ID there.   - wound cx grew Citrobacter, staph simulans & helcococcus yassine, and poryphyromonas  - now s/p bilateral partial first and second ray resection, closed, EBL 30cc, patient bled moderately on left but inadequately on right, high concern for viability, low concern for residual infection as bone quality at level of resection was hard and adequate with Podiatry on 4/12  - f/u clean bone blood cx obtained in OR 4/12 - growing GPCs  - ID re-consulted re: need for abx on d/c, recs appreciated  - discussed with ID Attending Dr. 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 however with new fever on 4/13. abx plan as above [facility] arterial duplex showing mod severe disease.   - s/p peripheral angio 4/7/2023 revealing  of SFA bilaterally s/p DCBA and stenting of R SFA  - Vascular Cardiology consult appreciated  - patient with known PAD with CLI [place] Classification 6 with bilateral  of the SFAs,   - s/p DCBA and stent to the distal R SFA 4/7/23   - now s/p stent to L SFA on 4/10  - c/w DAPT, cilostazol

## 2023-04-14 NOTE — PROGRESS NOTE ADULT - PROBLEM SELECTOR PLAN 8
DVT ppx: hep subc    Dispo: pending completion of PT eval with [employer]    Offered to update wife several times, but patient declined. Prefers to update himself. BP elevated  - c/w Toprol XL 50mg daily  - c/w nifedipine XL to 60mg daily  - c/w imdur ER 30mg daily  - monitor vitals

## 2023-04-14 NOTE — PROGRESS NOTE ADULT - SUBJECTIVE AND OBJECTIVE BOX
Patient is a 72y old  Male who presents with a chief complaint of Transferred from [facility] for possible vascular intervention. (2023 14:59)       INTERVAL HPI/OVERNIGHT EVENTS:  Patient seen and evaluated at bedside.  Pt is resting comfortable in NAD. Denies N/V/F/C.     Allergies    penicillin (Unknown)    Intolerances        Vital Signs Last 24 Hrs  T(C): 36.8 (2023 05:26), Max: 37.1 (2023 11:16)  T(F): 98.3 (2023 05:26), Max: 98.8 (2023 11:16)  HR: 114 (2023 05:26) (114 - 118)  BP: 179/64 (2023 05:26) (139/73 - 179/64)  BP(mean): --  RR: 18 (2023 05:26) (18 - 18)  SpO2: 100% (2023 05:26) (96% - 100%)    Parameters below as of 2023 05:26  Patient On (Oxygen Delivery Method): room air        LABS:                        8.1    16.19 )-----------( 222      ( 2023 06:12 )             23.6     04-14    139  |  106  |  49<H>  ----------------------------<  179<H>  4.4   |  22  |  2.62<H>    Ca    8.8      2023 06:12  Phos  3.9     04-14  Mg     2.1     04-14        Urinalysis Basic - ( 2023 13:02 )    Color: Light Yellow / Appearance: Clear / S.015 / pH: x  Gluc: x / Ketone: Negative  / Bili: Negative / Urobili: <2 mg/dL   Blood: x / Protein: Trace / Nitrite: Negative   Leuk Esterase: Negative / RBC: x / WBC x   Sq Epi: x / Non Sq Epi: x / Bacteria: x      CAPILLARY BLOOD GLUCOSE      POCT Blood Glucose.: 175 mg/dL (2023 07:52)  POCT Blood Glucose.: 210 mg/dL (2023 21:26)  POCT Blood Glucose.: 159 mg/dL (2023 16:45)  POCT Blood Glucose.: 366 mg/dL (2023 12:34)      Lower Extremity Physical Exam:  Vascular: DP/PT 0/4, B/L, CFT <3 seconds B/L, Temperature gradient warm to cool, B/L.   Neuro: Epicritic sensation diminished to the level of the toes, B/L.  Musculoskeletal/Ortho: Unremarkable.  Skin: 4/12 s/p b/l P12RR: Right foot: skin edges well coapted, warm, no early ischemic changes, sutures intact, no signs of infection. Left foot: well coapted, viable, no early ischemic changes, no signs of infection    RADIOLOGY & ADDITIONAL TESTS:

## 2023-04-14 NOTE — PROGRESS NOTE ADULT - ASSESSMENT
Assessment:  1. CTLI with CLI [place] Classification 6 with bilateral  of the SFAs, s/p DCBA and stent to the distal RSFA 4/7/23, s/p LSFA stent and DCBA 4/10/23  2. Bilateral metatarsal amputation   3. Chronic osteomyelitis receiving abx   4. CAD s/p prior PCI  5. HTN  6. HLD  7. DM  8. CKD stage 4  9. Current Smoker      Plan:  1. Successful percutaneous revascularization of bilateral SFA's    2. Continue Plavix and ASA 81 mg. Cont Cilostazol.   3. Statin therapy   4. IV abx for chronic osteomyelitis- appreciate ID input          Please call with any questions:

## 2023-04-14 NOTE — PROGRESS NOTE ADULT - PROBLEM SELECTOR PLAN 3
-possible soft tissue infection now s/p toe amputation   -vancomycin 1 gm iv x 1   -dc cefepime  -cipro 200 mg iv q12 + flagyl 500 mg q12   -bcx negative

## 2023-04-14 NOTE — PROGRESS NOTE ADULT - PROBLEM SELECTOR PLAN 7
BP elevated  - c/w Toprol XL 50mg daily  - c/w nifedipine XL to 60mg daily  - c/w imdur ER 30mg daily  - monitor vitals - c/w ASA + Plavix  - c/w statin  - c/w metoprolol XL 50mg daily  - c/w imdur 30mg daily

## 2023-04-15 LAB
-  AMPICILLIN/SULBACTAM: SIGNIFICANT CHANGE UP
-  CEFAZOLIN: SIGNIFICANT CHANGE UP
-  CLINDAMYCIN: SIGNIFICANT CHANGE UP
-  ERYTHROMYCIN: SIGNIFICANT CHANGE UP
-  GENTAMICIN: SIGNIFICANT CHANGE UP
-  OXACILLIN: SIGNIFICANT CHANGE UP
-  RIFAMPIN: SIGNIFICANT CHANGE UP
-  TETRACYCLINE: SIGNIFICANT CHANGE UP
-  TRIMETHOPRIM/SULFAMETHOXAZOLE: SIGNIFICANT CHANGE UP
-  VANCOMYCIN: SIGNIFICANT CHANGE UP
ANION GAP SERPL CALC-SCNC: 12 MMOL/L — SIGNIFICANT CHANGE UP (ref 5–17)
BASOPHILS # BLD AUTO: 0.05 K/UL — SIGNIFICANT CHANGE UP (ref 0–0.2)
BASOPHILS NFR BLD AUTO: 0.3 % — SIGNIFICANT CHANGE UP (ref 0–2)
BUN SERPL-MCNC: 54 MG/DL — HIGH (ref 7–23)
CALCIUM SERPL-MCNC: 8.7 MG/DL — SIGNIFICANT CHANGE UP (ref 8.4–10.5)
CHLORIDE SERPL-SCNC: 108 MMOL/L — SIGNIFICANT CHANGE UP (ref 96–108)
CO2 SERPL-SCNC: 19 MMOL/L — LOW (ref 22–31)
CREAT SERPL-MCNC: 2.61 MG/DL — HIGH (ref 0.5–1.3)
EGFR: 25 ML/MIN/1.73M2 — LOW
EOSINOPHIL # BLD AUTO: 0.21 K/UL — SIGNIFICANT CHANGE UP (ref 0–0.5)
EOSINOPHIL NFR BLD AUTO: 1.3 % — SIGNIFICANT CHANGE UP (ref 0–6)
GLUCOSE BLDC GLUCOMTR-MCNC: 113 MG/DL — HIGH (ref 70–99)
GLUCOSE BLDC GLUCOMTR-MCNC: 155 MG/DL — HIGH (ref 70–99)
GLUCOSE BLDC GLUCOMTR-MCNC: 182 MG/DL — HIGH (ref 70–99)
GLUCOSE BLDC GLUCOMTR-MCNC: 314 MG/DL — HIGH (ref 70–99)
GLUCOSE SERPL-MCNC: 146 MG/DL — HIGH (ref 70–99)
HCT VFR BLD CALC: 21.7 % — LOW (ref 39–50)
HGB BLD-MCNC: 7.6 G/DL — LOW (ref 13–17)
IMM GRANULOCYTES NFR BLD AUTO: 0.5 % — SIGNIFICANT CHANGE UP (ref 0–0.9)
LYMPHOCYTES # BLD AUTO: 1.83 K/UL — SIGNIFICANT CHANGE UP (ref 1–3.3)
LYMPHOCYTES # BLD AUTO: 11.2 % — LOW (ref 13–44)
MAGNESIUM SERPL-MCNC: 2.1 MG/DL — SIGNIFICANT CHANGE UP (ref 1.6–2.6)
MCHC RBC-ENTMCNC: 27 PG — SIGNIFICANT CHANGE UP (ref 27–34)
MCHC RBC-ENTMCNC: 35 GM/DL — SIGNIFICANT CHANGE UP (ref 32–36)
MCV RBC AUTO: 77.2 FL — LOW (ref 80–100)
METHOD TYPE: SIGNIFICANT CHANGE UP
MONOCYTES # BLD AUTO: 1.16 K/UL — HIGH (ref 0–0.9)
MONOCYTES NFR BLD AUTO: 7.1 % — SIGNIFICANT CHANGE UP (ref 2–14)
NEUTROPHILS # BLD AUTO: 12.94 K/UL — HIGH (ref 1.8–7.4)
NEUTROPHILS NFR BLD AUTO: 79.6 % — HIGH (ref 43–77)
NRBC # BLD: 0 /100 WBCS — SIGNIFICANT CHANGE UP (ref 0–0)
PHOSPHATE SERPL-MCNC: 4.3 MG/DL — SIGNIFICANT CHANGE UP (ref 2.5–4.5)
PLATELET # BLD AUTO: 202 K/UL — SIGNIFICANT CHANGE UP (ref 150–400)
POTASSIUM SERPL-MCNC: 4.1 MMOL/L — SIGNIFICANT CHANGE UP (ref 3.5–5.3)
POTASSIUM SERPL-SCNC: 4.1 MMOL/L — SIGNIFICANT CHANGE UP (ref 3.5–5.3)
RBC # BLD: 2.81 M/UL — LOW (ref 4.2–5.8)
RBC # FLD: 16.5 % — HIGH (ref 10.3–14.5)
SODIUM SERPL-SCNC: 139 MMOL/L — SIGNIFICANT CHANGE UP (ref 135–145)
VANCOMYCIN FLD-MCNC: 6.8 UG/ML — SIGNIFICANT CHANGE UP
WBC # BLD: 16.27 K/UL — HIGH (ref 3.8–10.5)
WBC # FLD AUTO: 16.27 K/UL — HIGH (ref 3.8–10.5)

## 2023-04-15 PROCEDURE — 99232 SBSQ HOSP IP/OBS MODERATE 35: CPT

## 2023-04-15 PROCEDURE — 99233 SBSQ HOSP IP/OBS HIGH 50: CPT

## 2023-04-15 RX ORDER — VANCOMYCIN HCL 1 G
1000 VIAL (EA) INTRAVENOUS ONCE
Refills: 0 | Status: COMPLETED | OUTPATIENT
Start: 2023-04-15 | End: 2023-04-15

## 2023-04-15 RX ORDER — ACETAMINOPHEN 500 MG
1000 TABLET ORAL ONCE
Refills: 0 | Status: COMPLETED | OUTPATIENT
Start: 2023-04-15 | End: 2023-04-15

## 2023-04-15 RX ADMIN — Medication 100 MILLIGRAM(S): at 18:36

## 2023-04-15 RX ADMIN — INSULIN GLARGINE 22 UNIT(S): 100 INJECTION, SOLUTION SUBCUTANEOUS at 21:58

## 2023-04-15 RX ADMIN — Medication 10 UNIT(S): at 12:20

## 2023-04-15 RX ADMIN — Medication 250 MILLIGRAM(S): at 11:51

## 2023-04-15 RX ADMIN — SENNA PLUS 2 TABLET(S): 8.6 TABLET ORAL at 21:58

## 2023-04-15 RX ADMIN — Medication 100 MILLIGRAM(S): at 09:36

## 2023-04-15 RX ADMIN — Medication 400 MILLIGRAM(S): at 14:16

## 2023-04-15 RX ADMIN — Medication 2: at 16:47

## 2023-04-15 RX ADMIN — CLOPIDOGREL BISULFATE 75 MILLIGRAM(S): 75 TABLET, FILM COATED ORAL at 11:53

## 2023-04-15 RX ADMIN — ISOSORBIDE MONONITRATE 30 MILLIGRAM(S): 60 TABLET, EXTENDED RELEASE ORAL at 12:05

## 2023-04-15 RX ADMIN — Medication 8: at 12:20

## 2023-04-15 RX ADMIN — Medication 81 MILLIGRAM(S): at 11:53

## 2023-04-15 RX ADMIN — PANTOPRAZOLE SODIUM 40 MILLIGRAM(S): 20 TABLET, DELAYED RELEASE ORAL at 05:31

## 2023-04-15 RX ADMIN — Medication 60 MILLIGRAM(S): at 05:31

## 2023-04-15 RX ADMIN — Medication 2: at 08:25

## 2023-04-15 RX ADMIN — Medication 100 MILLIGRAM(S): at 05:31

## 2023-04-15 RX ADMIN — Medication 1 TABLET(S): at 11:52

## 2023-04-15 RX ADMIN — CILOSTAZOL 100 MILLIGRAM(S): 100 TABLET ORAL at 18:36

## 2023-04-15 RX ADMIN — ATORVASTATIN CALCIUM 40 MILLIGRAM(S): 80 TABLET, FILM COATED ORAL at 21:58

## 2023-04-15 RX ADMIN — Medication 10 UNIT(S): at 16:48

## 2023-04-15 RX ADMIN — Medication 10 UNIT(S): at 08:26

## 2023-04-15 RX ADMIN — CHLORHEXIDINE GLUCONATE 1 APPLICATION(S): 213 SOLUTION TOPICAL at 12:20

## 2023-04-15 RX ADMIN — CILOSTAZOL 100 MILLIGRAM(S): 100 TABLET ORAL at 05:31

## 2023-04-15 RX ADMIN — Medication 100 MILLIGRAM(S): at 21:58

## 2023-04-15 RX ADMIN — Medication 1000 MILLIGRAM(S): at 15:20

## 2023-04-15 RX ADMIN — Medication 50 MILLIGRAM(S): at 05:31

## 2023-04-15 NOTE — PROGRESS NOTE ADULT - PROBLEM SELECTOR PLAN 3
VA arterial duplex showing mod severe disease.   - s/p peripheral angio 4/7/2023 revealing  of SFA bilaterally s/p DCBA and stenting of R SFA  - Vascular Cardiology consult appreciated  - patient with known PAD with CLI [place] Classification 6 with bilateral  of the SFAs,   - s/p DCBA and stent to the distal R SFA 4/7/23   - now s/p stent to L SFA on 4/10  - c/w DAPT, cilostazol

## 2023-04-15 NOTE — PROGRESS NOTE ADULT - PROBLEM SELECTOR PLAN 1
new fever to 101.7 on 4/13 AM with worsening leukocytosis to 17K. meets sepsis criteria.   - still having low grade temps (rectal today high 99's), which likely explains ongoing episodic sinus tachycardia.   - CXR with no evidence of pneumonia  - UA negative  - 4/13 blood cx with NGTD  - 4/12 OR bone cx with GPCs, staph simulans, and citrobacter freundii -  continue to follow  - ID following - added doxy 100 BID, continue cipro and flagyl for now.   - follow OR cultures  - seems volume status at goal

## 2023-04-15 NOTE — PROGRESS NOTE ADULT - PROBLEM SELECTOR PLAN 2
uptrending HR with tele showing sinus tachycardia  - suspecting 2/2 to recent fever/infection and pre-renal etiology  - continue telemetry monitoring

## 2023-04-15 NOTE — PROGRESS NOTE ADULT - PROBLEM SELECTOR PLAN 6
HbA1c 5.5%  - c/w sliding scale  - c/w lantus 22 units qHS  - c/w pre-meal admelog to 10 units TID qAC  - monitor FS qAC + qHS

## 2023-04-15 NOTE — PROGRESS NOTE ADULT - SUBJECTIVE AND OBJECTIVE BOX
Follow Up:     Interval History/ROS:     Allergies  penicillin (Unknown)        ANTIMICROBIALS:  ciprofloxacin   IVPB 200 every 12 hours  metroNIDAZOLE  IVPB 500 every 12 hours  vancomycin  IVPB 1000 once      OTHER MEDS:  acetaminophen  300 mG/codeine 30 mG 1 Tablet(s) Oral every 4 hours PRN  aspirin enteric coated 81 milliGRAM(s) Oral daily  atorvastatin 40 milliGRAM(s) Oral at bedtime  chlorhexidine 2% Cloths 1 Application(s) Topical daily  cilostazol 100 milliGRAM(s) Oral two times a day  clopidogrel Tablet 75 milliGRAM(s) Oral daily  dextrose 5%. 1000 milliLiter(s) IV Continuous <Continuous>  dextrose 5%. 1000 milliLiter(s) IV Continuous <Continuous>  dextrose 50% Injectable 12.5 Gram(s) IV Push once  dextrose 50% Injectable 25 Gram(s) IV Push once  dextrose Oral Gel 15 Gram(s) Oral once PRN  glucagon  Injectable 1 milliGRAM(s) IntraMuscular once  insulin glargine Injectable (LANTUS) 22 Unit(s) SubCutaneous at bedtime  insulin lispro (ADMELOG) corrective regimen sliding scale   SubCutaneous three times a day before meals  insulin lispro (ADMELOG) corrective regimen sliding scale   SubCutaneous at bedtime  insulin lispro Injectable (ADMELOG) 10 Unit(s) SubCutaneous three times a day before meals  isosorbide   mononitrate ER Tablet (IMDUR) 30 milliGRAM(s) Oral daily  metoprolol succinate ER 50 milliGRAM(s) Oral daily  Nephro-dorita 1 Tablet(s) Oral daily  NIFEdipine XL 60 milliGRAM(s) Oral daily  oxycodone    5 mG/acetaminophen 325 mG 1 Tablet(s) Oral every 6 hours PRN  pantoprazole    Tablet 40 milliGRAM(s) Oral before breakfast  senna 2 Tablet(s) Oral at bedtime  sodium chloride 0.9%. 1000 milliLiter(s) IV Continuous <Continuous>      Vital Signs Last 24 Hrs  T(C): 37.4 (15 Apr 2023 04:35), Max: 37.4 (2023 22:50)  T(F): 99.3 (15 Apr 2023 04:35), Max: 99.3 (2023 22:50)  HR: 118 (15 Apr 2023 04:35) (85 - 122)  BP: 159/75 (15 Apr 2023 04:35) (117/63 - 159/75)  BP(mean): --  RR: 18 (15 Apr 2023 04:35) (18 - 18)  SpO2: 92% (15 Apr 2023 04:35) (92% - 96%)    Parameters below as of 15 Apr 2023 04:35  Patient On (Oxygen Delivery Method): room air        Physical Exam:  General: non toxic  Cardio: regular rate   Respiratory: nonlabored   abd: nondistended  Musculoskeletal: no focal joint swelling, no edema  vascular: no phlebitis   Skin: no rash                          7.6    16.27 )-----------( 202      ( 15 Apr 2023 05:43 )             21.7       15    139  |  108  |  54<H>  ----------------------------<  146<H>  4.1   |  19<L>  |  2.61<H>    Ca    8.7      15 Apr 2023 05:42  Phos  4.3     04-15  Mg     2.1     04-15        Urinalysis Basic - ( 2023 13:02 )    Color: Light Yellow / Appearance: Clear / S.015 / pH: x  Gluc: x / Ketone: Negative  / Bili: Negative / Urobili: <2 mg/dL   Blood: x / Protein: Trace / Nitrite: Negative   Leuk Esterase: Negative / RBC: x / WBC x   Sq Epi: x / Non Sq Epi: x / Bacteria: x        MICROBIOLOGY:  Culture - Blood (collected 23 @ 07:34)  Source: .Blood Blood-Peripheral  Preliminary Report (23 @ 12:34):    No growth to date.    Culture - Blood (collected 23 @ 07:34)  Source: .Blood Blood-Peripheral  Preliminary Report (23 @ 12:34):    No growth to date.    Culture - Tissue with Gram Stain (collected 23 @ 13:46)  Source: .Tissue Other  Gram Stain (23 @ 22:00):    Few polymorphonuclear leukocytes per low power field    Few Gram positive cocci in pairs per oil power field  Preliminary Report (23 @ 15:44):    Rare Staphylococcus simulans  Organism: Staphylococcus simulans (04-15-23 @ 11:31)  Organism: Staphylococcus simulans (04-15-23 @ 11:31)      Method Type: JAMAAL      -  Ampicillin/Sulbactam: R <=8/4      -  Cefazolin: R <=4      -  Clindamycin: R <=0.25 This isolate is presumed to be clindamycin resistant based on detection of inducible resistance. Clindamycin may still be effective in some patients.      -  Erythromycin: R >4      -  Gentamicin: S <=1 Should not be used as monotherapy      -  Oxacillin: R 0.5      -  Rifampin: S <=1 Should not be used as monotherapy      -  Tetracycline: S <=1      -  Trimethoprim/Sulfamethoxazole: S <=0.5/9.5      -  Vancomycin: S 1    Culture - Surgical Swab (collected 23 @ 13:44)  Source: .Surgical Swab right foot deep wound  Preliminary Report (23 @ 19:56):    Rare Citrobacter freundii complex    Rare Coag Negative Staphylococcus  Organism: Citrobacter freundii complex  Staphylococcus simulans (23 @ 19:51)  Organism: Staphylococcus simulans (23 @ 19:51)      Method Type: JAMAAL      -  Ampicillin/Sulbactam: R <=8/4      -  Cefazolin: R <=4      -  Clindamycin: R <=0.25 This isolate is presumed to be clindamycin resistant based on detection of inducible resistance. Clindamycin may still be effective in some patients.      -  Erythromycin: R >4      -  Gentamicin: S <=1 Should not be used as monotherapy      -  Oxacillin: R 1      -  Rifampin: S <=1 Should not be used as monotherapy      -  Tetracycline: S <=1      -  Trimethoprim/Sulfamethoxazole: S <=0.5/9.5      -  Vancomycin: S 1  Organism: Citrobacter freundii complex (23 @ 17:10)      Method Type: JAMAAL      -  Amikacin: S <=16      -  Amoxicillin/Clavulanic Acid: R <=8/4      -  Ampicillin: R >16 These ampicillin results predict results for amoxicillin      -  Ampicillin/Sulbactam: R >16/8 Enterobacter, Klebsiella aerogenes, Citrobacter, and Serratia may develop resistance during prolonged therapy (3-4 days)      -  Aztreonam: S <=4      -  Cefazolin: R >16 Enterobacter, Klebsiella aerogenes, Citrobacter, and Serratia may develop resistance during prolonged therapy (3-4 days)      -  Cefepime: S <=2      -  Cefoxitin: R <=8      -  Ceftriaxone: R 8 Enterobacter, Klebsiella aerogenes, Citrobacter, and Serratia may develop resistance during prolonged therapy      -  Ciprofloxacin: S <=0.25      -  Ertapenem: S <=0.5      -  Gentamicin: S <=2      -  Imipenem: S <=1      -  Levofloxacin: S <=0.5      -  Meropenem: S <=1      -  Piperacillin/Tazobactam: S <=8      -  Tobramycin: S <=2      -  Trimethoprim/Sulfamethoxazole: S <=0.5/9.5    Culture - Surgical Swab (collected 23 @ 13:40)  Source: .Surgical Swab left foot deep wound  Preliminary Report (23 @ 15:54):    No growth to date.    Culture - Tissue with Gram Stain (collected 23 @ 13:36)  Source: .Tissue Other  Gram Stain (23 @ 22:41):    No polymorphonuclear cells seen per low power field    Rare Gram positive cocci in pairs per oil power field  Preliminary Report (23 @ 14:51):    No growth      Vancomycin Level, Random: 6.8 ug/mL (04-15-23 @ 05:43)      RADIOLOGY:  Images below reviewed personally   Follow Up: foot infection    Interval History/ROS: Afebrile, feels fine, no chills or shivering. No pain to his feet. No cough or diarrhea.     Allergies  penicillin (Unknown)        ANTIMICROBIALS:  ciprofloxacin   IVPB 200 every 12 hours  metroNIDAZOLE  IVPB 500 every 12 hours  vancomycin  IVPB 1000 once      OTHER MEDS:  acetaminophen  300 mG/codeine 30 mG 1 Tablet(s) Oral every 4 hours PRN  aspirin enteric coated 81 milliGRAM(s) Oral daily  atorvastatin 40 milliGRAM(s) Oral at bedtime  chlorhexidine 2% Cloths 1 Application(s) Topical daily  cilostazol 100 milliGRAM(s) Oral two times a day  clopidogrel Tablet 75 milliGRAM(s) Oral daily  dextrose 5%. 1000 milliLiter(s) IV Continuous <Continuous>  dextrose 5%. 1000 milliLiter(s) IV Continuous <Continuous>  dextrose 50% Injectable 12.5 Gram(s) IV Push once  dextrose 50% Injectable 25 Gram(s) IV Push once  dextrose Oral Gel 15 Gram(s) Oral once PRN  glucagon  Injectable 1 milliGRAM(s) IntraMuscular once  insulin glargine Injectable (LANTUS) 22 Unit(s) SubCutaneous at bedtime  insulin lispro (ADMELOG) corrective regimen sliding scale   SubCutaneous three times a day before meals  insulin lispro (ADMELOG) corrective regimen sliding scale   SubCutaneous at bedtime  insulin lispro Injectable (ADMELOG) 10 Unit(s) SubCutaneous three times a day before meals  isosorbide   mononitrate ER Tablet (IMDUR) 30 milliGRAM(s) Oral daily  metoprolol succinate ER 50 milliGRAM(s) Oral daily  Nephro-dorita 1 Tablet(s) Oral daily  NIFEdipine XL 60 milliGRAM(s) Oral daily  oxycodone    5 mG/acetaminophen 325 mG 1 Tablet(s) Oral every 6 hours PRN  pantoprazole    Tablet 40 milliGRAM(s) Oral before breakfast  senna 2 Tablet(s) Oral at bedtime  sodium chloride 0.9%. 1000 milliLiter(s) IV Continuous <Continuous>      Vital Signs Last 24 Hrs  T(C): 37.4 (15 Apr 2023 04:35), Max: 37.4 (2023 22:50)  T(F): 99.3 (15 Apr 2023 04:35), Max: 99.3 (2023 22:50)  HR: 118 (15 Apr 2023 04:35) (85 - 122)  BP: 159/75 (15 Apr 2023 04:35) (117/63 - 159/75)  BP(mean): --  RR: 18 (15 Apr 2023 04:35) (18 - 18)  SpO2: 92% (15 Apr 2023 04:35) (92% - 96%)    Parameters below as of 15 Apr 2023 04:35  Patient On (Oxygen Delivery Method): room air        Physical Exam:  General: non toxic, alert   Cardio: regular rate   Respiratory: nonlabored   abd: nondistended  Musculoskeletal: bilateral 1st and 2nd toes amputated, sites clean, intact, sutures in place   vascular: no phlebitis   Skin: no rash                          7.6    16.27 )-----------( 202      ( 15 Apr 2023 05:43 )             21.7       04-15    139  |  108  |  54<H>  ----------------------------<  146<H>  4.1   |  19<L>  |  2.61<H>    Ca    8.7      15 Apr 2023 05:42  Phos  4.3     04-15  Mg     2.1     04-15        Urinalysis Basic - ( 2023 13:02 )    Color: Light Yellow / Appearance: Clear / S.015 / pH: x  Gluc: x / Ketone: Negative  / Bili: Negative / Urobili: <2 mg/dL   Blood: x / Protein: Trace / Nitrite: Negative   Leuk Esterase: Negative / RBC: x / WBC x   Sq Epi: x / Non Sq Epi: x / Bacteria: x        MICROBIOLOGY:  Culture - Blood (collected 23 @ 07:34)  Source: .Blood Blood-Peripheral  Preliminary Report (23 @ 12:34):    No growth to date.    Culture - Blood (collected 23 @ 07:34)  Source: .Blood Blood-Peripheral  Preliminary Report (23 @ 12:34):    No growth to date.    Culture - Tissue with Gram Stain (collected 23 @ 13:46)  Source: .Tissue Other  Gram Stain (23 @ 22:00):    Few polymorphonuclear leukocytes per low power field    Few Gram positive cocci in pairs per oil power field  Preliminary Report (23 @ 15:44):    Rare Staphylococcus simulans  Organism: Staphylococcus simulans (04-15-23 @ 11:31)  Organism: Staphylococcus simulans (04-15-23 @ 11:31)      Method Type: JAMAAL      -  Ampicillin/Sulbactam: R <=8/4      -  Cefazolin: R <=4      -  Clindamycin: R <=0.25 This isolate is presumed to be clindamycin resistant based on detection of inducible resistance. Clindamycin may still be effective in some patients.      -  Erythromycin: R >4      -  Gentamicin: S <=1 Should not be used as monotherapy      -  Oxacillin: R 0.5      -  Rifampin: S <=1 Should not be used as monotherapy      -  Tetracycline: S <=1      -  Trimethoprim/Sulfamethoxazole: S <=0.5/9.5      -  Vancomycin: S 1    Culture - Surgical Swab (collected 23 @ 13:44)  Source: .Surgical Swab right foot deep wound  Preliminary Report (23 @ 19:56):    Rare Citrobacter freundii complex    Rare Coag Negative Staphylococcus  Organism: Citrobacter freundii complex  Staphylococcus simulans (23 @ 19:51)  Organism: Staphylococcus simulans (23 @ 19:51)      Method Type: JAMAAL      -  Ampicillin/Sulbactam: R <=8/4      -  Cefazolin: R <=4      -  Clindamycin: R <=0.25 This isolate is presumed to be clindamycin resistant based on detection of inducible resistance. Clindamycin may still be effective in some patients.      -  Erythromycin: R >4      -  Gentamicin: S <=1 Should not be used as monotherapy      -  Oxacillin: R 1      -  Rifampin: S <=1 Should not be used as monotherapy      -  Tetracycline: S <=1      -  Trimethoprim/Sulfamethoxazole: S <=0.5/9.5      -  Vancomycin: S 1  Organism: Citrobacter freundii complex (23 @ 17:10)      Method Type: JAMAAL      -  Amikacin: S <=16      -  Amoxicillin/Clavulanic Acid: R <=8/4      -  Ampicillin: R >16 These ampicillin results predict results for amoxicillin      -  Ampicillin/Sulbactam: R >16/8 Enterobacter, Klebsiella aerogenes, Citrobacter, and Serratia may develop resistance during prolonged therapy (3-4 days)      -  Aztreonam: S <=4      -  Cefazolin: R >16 Enterobacter, Klebsiella aerogenes, Citrobacter, and Serratia may develop resistance during prolonged therapy (3-4 days)      -  Cefepime: S <=2      -  Cefoxitin: R <=8      -  Ceftriaxone: R 8 Enterobacter, Klebsiella aerogenes, Citrobacter, and Serratia may develop resistance during prolonged therapy      -  Ciprofloxacin: S <=0.25      -  Ertapenem: S <=0.5      -  Gentamicin: S <=2      -  Imipenem: S <=1      -  Levofloxacin: S <=0.5      -  Meropenem: S <=1      -  Piperacillin/Tazobactam: S <=8      -  Tobramycin: S <=2      -  Trimethoprim/Sulfamethoxazole: S <=0.5/9.5    Culture - Surgical Swab (collected 23 @ 13:40)  Source: .Surgical Swab left foot deep wound  Preliminary Report (23 @ 15:54):    No growth to date.    Culture - Tissue with Gram Stain (collected 23 @ 13:36)  Source: .Tissue Other  Gram Stain (23 @ 22:41):    No polymorphonuclear cells seen per low power field    Rare Gram positive cocci in pairs per oil power field  Preliminary Report (23 @ 14:51):    No growth      Vancomycin Level, Random: 6.8 ug/mL (04-15-23 @ 05:43)      RADIOLOGY:  Images below reviewed personally  Xray Chest 1 View- PORTABLE-Urgent (Xray Chest 1 View- PORTABLE-Urgent .) (23 @ 09:40)   Clear lungs.    Xray Foot AP + Lateral + Oblique, Bilat (23 @ 09:49)   Status post bilateral partial 1st and 2nd ray resections through distal   1st metatarsal shafts and portion of 2nd metatarsal heads including   removed hallux sesamoid bones.  Sharp smooth slightly beveled osseous stump margins with postsurgical   changes in the overlying soft tissues. No proximally tracking gas   collections beyond the amputation margins and no focal areas of   osteomyelitis.  Remainder of both feet unchanged.  Also correlate with intraoperative findings.

## 2023-04-15 NOTE — PROGRESS NOTE ADULT - PROBLEM SELECTOR PLAN 5
Unclear baseline.   - Cr overall stable/slightly uptrended today  - renal ultrasound with no hydro  - Nephrology consulted, recs appreciated  - IV hydration as above  - renally dose meds to GFR, avoid nephrotoxic agents  - monitor Cr on BMP

## 2023-04-15 NOTE — PROGRESS NOTE ADULT - PROBLEM SELECTOR PLAN 4
Pt with stay at OSH on iv abx seen by pod and ID there.   - wound cx grew Citrobacter, staph simulans & helcococcus yassine, and poryphyromonas  - now s/p bilateral partial first and second ray resection, closed, EBL 30cc, patient bled moderately on left but inadequately on right, high concern for viability, low concern for residual infection as bone quality at level of resection was hard and adequate with Podiatry on 4/12  - f/u clean bone blood cx obtained in OR 4/12 - growing GPCs  - ID re-consulted re: need for abx on d/c, recs appreciated  - discussed with ID Attending Dr. 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 however with new fever on 4/13. abx plan as above

## 2023-04-15 NOTE — PROGRESS NOTE ADULT - ASSESSMENT
72M A1c 5.1%, CKD, PAD, active smoker.   Admitted at [facility] 3/30/23 for right hallux gangrene after losing the toenail one week prior.   Transferred 4/4 pre-operative cardiovascular risk eval.   s/p right SFA angioplasty and stenting 4/7.   s/p bilateral first and second ray resections 4/12 with low concern for residual infection.   Had a fever that night 4/12.   Post operative? Blood cultures negative.     ·Chronic osteomyelitis.   ·  Plan: -minimal role for abx given chronic and gangrene  -s/p amputation.    ·  Problem: Fever.   ·  Plan: -possible soft tissue infection now s/p toe amputation   -vancomycin 1 gm iv x 1   -dc cefepime  -cipro 200 mg iv q12 + flagyl 500 mg q12   -bcx negative. 72M A1c 5.1%, CKD, PAD, active smoker.   Admitted at [facility] 3/30/23 for right hallux gangrene after losing the toenail one week prior.   Transferred 4/4 pre-operative cardiovascular risk eval.   s/p right SFA angioplasty and stenting 4/7.   s/p bilateral first and second ray resections 4/12 with low concern for residual infection.   Had a fever that night 4/12.   Infection? Blood cultures negative and surgical sites reported as clean.   Post op inflammation?     Penicillin allergy?     -cipro 200 mg iv q12 + flagyl 500 mg q12   Doxyc?   -monitor blood cultures     Daniel Alexandra MD   Infectious Disease   Available on TEAMS. After 5PM and on weekends please page fellow on call or call [phone] 72M A1c 5.1%, CKD, PAD, active smoker.   Admitted at [facility] 3/30/23 for right hallux gangrene after losing the toenail one week prior.   Transferred 4/4 pre-operative cardiovascular risk eval.   s/p right SFA angioplasty and stenting 4/7.   s/p bilateral first and second ray resections 4/12 with low concern for residual infection.   Had a fever that night.   Infection? Blood cultures negative and surgical sites reported as clean.   On antibiotics covering OR cultures - Citrobacter and Staph simulans.   Post op inflammation?     Suggest  -can continue Cipro and Flagyl   -instead of Vanc can just use PO Doxycycline 100mg BID for the Staph   -monitor blood cultures   -f/u clean bone path although unlikely to benefit from a prolonged course of antibiotics even if positive     Discussed with medicine   I will be away starting tomorrow.     Daniel Alexandra MD   Infectious Disease   Available on TEAMS. After 5PM and on weekends please page fellow on call or call [phone]

## 2023-04-15 NOTE — PROGRESS NOTE ADULT - SUBJECTIVE AND OBJECTIVE BOX
[facility] Division of Hospital Medicine  uJvenal Krause MD, JEFF  I'm reachable on Microsoft Teams   Off hours:  [phone] ([facility] pager)    Patient is a 72y old  Male who presents with a chief complaint of Transferred from [facility] for possible vascular intervention. (15 Apr 2023 11:50)      SUBJECTIVE / OVERNIGHT EVENTS:  No events overnight. No acute complaints. Does not feel anxious, pain, fever, palpitations.     MEDICATIONS  (STANDING):  acetaminophen   IVPB .. 1000 milliGRAM(s) IV Intermittent once  aspirin enteric coated 81 milliGRAM(s) Oral daily  atorvastatin 40 milliGRAM(s) Oral at bedtime  chlorhexidine 2% Cloths 1 Application(s) Topical daily  cilostazol 100 milliGRAM(s) Oral two times a day  ciprofloxacin   IVPB 200 milliGRAM(s) IV Intermittent every 12 hours  clopidogrel Tablet 75 milliGRAM(s) Oral daily  dextrose 5%. 1000 milliLiter(s) (50 mL/Hr) IV Continuous <Continuous>  dextrose 5%. 1000 milliLiter(s) (100 mL/Hr) IV Continuous <Continuous>  dextrose 50% Injectable 12.5 Gram(s) IV Push once  dextrose 50% Injectable 25 Gram(s) IV Push once  doxycycline monohydrate Capsule 100 milliGRAM(s) Oral every 12 hours  glucagon  Injectable 1 milliGRAM(s) IntraMuscular once  insulin glargine Injectable (LANTUS) 22 Unit(s) SubCutaneous at bedtime  insulin lispro (ADMELOG) corrective regimen sliding scale   SubCutaneous three times a day before meals  insulin lispro (ADMELOG) corrective regimen sliding scale   SubCutaneous at bedtime  insulin lispro Injectable (ADMELOG) 10 Unit(s) SubCutaneous three times a day before meals  isosorbide   mononitrate ER Tablet (IMDUR) 30 milliGRAM(s) Oral daily  metoprolol succinate ER 50 milliGRAM(s) Oral daily  metroNIDAZOLE  IVPB 500 milliGRAM(s) IV Intermittent every 12 hours  Nephro-dorita 1 Tablet(s) Oral daily  NIFEdipine XL 60 milliGRAM(s) Oral daily  pantoprazole    Tablet 40 milliGRAM(s) Oral before breakfast  senna 2 Tablet(s) Oral at bedtime  sodium chloride 0.9%. 1000 milliLiter(s) (75 mL/Hr) IV Continuous <Continuous>    MEDICATIONS  (PRN):  acetaminophen  300 mG/codeine 30 mG 1 Tablet(s) Oral every 4 hours PRN Mild Pain (1 - 3)  dextrose Oral Gel 15 Gram(s) Oral once PRN Blood Glucose LESS THAN 70 milliGRAM(s)/deciliter  oxycodone    5 mG/acetaminophen 325 mG 1 Tablet(s) Oral every 6 hours PRN Severe Pain (7 - 10)    CAPILLARY BLOOD GLUCOSE      POCT Blood Glucose.: 314 mg/dL (15 Apr 2023 12:15)  POCT Blood Glucose.: 182 mg/dL (15 Apr 2023 07:49)  POCT Blood Glucose.: 184 mg/dL (14 Apr 2023 21:43)  POCT Blood Glucose.: 251 mg/dL (14 Apr 2023 16:23)    I&O's Summary      PHYSICAL EXAM:  Vital Signs Last 24 Hrs  T(C): 37.6 (15 Apr 2023 12:17), Max: 37.6 (15 Apr 2023 12:17)  T(F): 99.7 (15 Apr 2023 12:17), Max: 99.7 (15 Apr 2023 12:17)  HR: 89 (15 Apr 2023 11:55) (85 - 122)  BP: 137/68 (15 Apr 2023 11:55) (117/63 - 159/75)  BP(mean): --  RR: 18 (15 Apr 2023 11:55) (18 - 18)  SpO2: 94% (15 Apr 2023 11:55) (92% - 96%)    Parameters below as of 15 Apr 2023 11:55  Patient On (Oxygen Delivery Method): room air    CONSTITUTIONAL: NAD, well-developed, well-groomed  EYES: conjunctiva and sclera clear  ENMT: Moist oral mucosa  NECK: Supple  RESPIRATORY: Normal respiratory effort; lungs are clear to auscultation bilaterally  CARDIOVASCULAR: Regular rate and rhythm, normal S1 and S2, no murmur/rub/gallop; No lower extremity edema  ABDOMEN: Soft, Nondistended, Nontender to palpation, normoactive bowel sounds  MUSCULOSKELETAL: No clubbing or cyanosis of digits; b/l feet in post-op dressing c/d/i  PSYCH: A+O to person, place, and time; affect appropriate  NEUROLOGY: CN 2-12 are intact and symmetric; no gross sensory deficits   SKIN: No rashes; no palpable lesions, b/l venous stasis changes, b/l groin sites c/d/i       LABS:                        7.6    16.27 )-----------( 202      ( 15 Apr 2023 05:43 )             21.7     04-15    139  |  108  |  54<H>  ----------------------------<  146<H>  4.1   |  19<L>  |  2.61<H>    Ca    8.7      15 Apr 2023 05:42  Phos  4.3     04-15  Mg     2.1     04-15      Culture - Blood (collected 13 Apr 2023 07:34)  Source: .Blood Blood-Peripheral  Preliminary Report (14 Apr 2023 12:34):    No growth to date.    Culture - Blood (collected 13 Apr 2023 07:34)  Source: .Blood Blood-Peripheral  Preliminary Report (14 Apr 2023 12:34):    No growth to date.

## 2023-04-15 NOTE — PROGRESS NOTE ADULT - SUBJECTIVE AND OBJECTIVE BOX
DATE OF SERVICE: 04-15-23 @ 10:34    Patient is a 72y old  Male who presents with a chief complaint of Transferred from [facility] for possible vascular intervention. (14 Apr 2023 15:10)      INTERVAL HISTORY: no complaints     REVIEW OF SYSTEMS:  CONSTITUTIONAL: No weakness  EYES/ENT: No visual changes;  No throat pain   NECK: No pain or stiffness  RESPIRATORY: No cough, wheezing; No shortness of breath  CARDIOVASCULAR: No chest pain or palpitations  GASTROINTESTINAL: No abdominal  pain. No nausea, vomiting, or hematemesis  GENITOURINARY: No dysuria, frequency or hematuria  NEUROLOGICAL: No stroke like symptoms  SKIN: No rashes    TELEMETRY Personally reviewed: 5 beats WCT.  	  MEDICATIONS:  isosorbide   mononitrate ER Tablet (IMDUR) 30 milliGRAM(s) Oral daily  metoprolol succinate ER 50 milliGRAM(s) Oral daily  NIFEdipine XL 60 milliGRAM(s) Oral daily        PHYSICAL EXAM:  T(C): 37.4 (04-15-23 @ 04:35), Max: 37.4 (04-14-23 @ 11:17)  HR: 118 (04-15-23 @ 04:35) (85 - 123)  BP: 159/75 (04-15-23 @ 04:35) (117/63 - 159/75)  RR: 18 (04-15-23 @ 04:35) (18 - 18)  SpO2: 92% (04-15-23 @ 04:35) (92% - 97%)  Wt(kg): --  I&O's Summary        Appearance: In no distress	  HEENT:    PERRL, EOMI	  Cardiovascular:  S1 S2, No JVD  Respiratory: Lungs clear to auscultation	  Gastrointestinal:  Soft, Non-tender, + BS	  Vascularature:  No edema of LE  Psychiatric: Appropriate affect   Neuro: no acute focal deficits                               7.6    16.27 )-----------( 202      ( 15 Apr 2023 05:43 )             21.7     04-15    139  |  108  |  54<H>  ----------------------------<  146<H>  4.1   |  19<L>  |  2.61<H>    Ca    8.7      15 Apr 2023 05:42  Phos  4.3     04-15  Mg     2.1     04-15          Labs personally reviewed      ASSESSMENT/PLAN: 	  73 y/o M--patient with a history of type 2 DM complicated with CKD4, PAD with unclear past stenting hx in the RLE, essential HTN tobacco use,  transferred from [facility] 3/30/23 with spouse reporting the patient had lost his R hallux toenail about a week prior to that admission and noted dark discoloration of the R hallux and 2nd toe with foul odor emanating from the R foot    Plan of Care:    #Pre-op risk stratification  - S/p peripheral angio 4/7/2023 revealing  of SFA bilaterally s/p DCBA and stenting of R SFA. Tolerated procedure well.   - Plan for stent to L SFA Monday 4/10 with vascular team.   - Pt displays no evidence of coronary ischemia  - TTE shows normal LV systolic function with no significant structural abnormalities.   - From a cardiac standpoint, Pt is optimized for vascular intervention  - Pt is intermediate to moderate risk for desired procedure  - Further recommendations to follow pending clinical course.   - s/p partial amputation of both 1st metatarsals 4/12-- tolerated surgery well    #PAD  - Continue Cilostazol, and DAPT therapy  - Statin therapy  - Vascular input appreciated    #CKD  - Renal input appreciated    #CAD  - S/p PCI   -Continue Plavix, statin and BB    # Sinus Tachycardia  - HR elevated 100-120  - Possibly pre-renal post operatively vs reactive to infection (ID following) --> agree with trial if IVF  - Patient asymptomatic. Will cont to monitor on tele.  - If HR remains elevated, consider D-Dimer, B/L LE US r/o DVT, VQ scan  - 4/15 5 beats WCT. Asymptomatic. Likely i/s/i infection  A-paced. No intervention          ANTWON Villela DO [facility]  Cardiovascular Medicine  [address], [address]  Office: [phone]  Available via call/text on Microsoft Teams

## 2023-04-16 LAB
ANION GAP SERPL CALC-SCNC: 13 MMOL/L — SIGNIFICANT CHANGE UP (ref 5–17)
BUN SERPL-MCNC: 58 MG/DL — HIGH (ref 7–23)
CALCIUM SERPL-MCNC: 8.4 MG/DL — SIGNIFICANT CHANGE UP (ref 8.4–10.5)
CHLORIDE SERPL-SCNC: 106 MMOL/L — SIGNIFICANT CHANGE UP (ref 96–108)
CO2 SERPL-SCNC: 18 MMOL/L — LOW (ref 22–31)
CREAT SERPL-MCNC: 2.79 MG/DL — HIGH (ref 0.5–1.3)
EGFR: 23 ML/MIN/1.73M2 — LOW
GLUCOSE BLDC GLUCOMTR-MCNC: 157 MG/DL — HIGH (ref 70–99)
GLUCOSE BLDC GLUCOMTR-MCNC: 223 MG/DL — HIGH (ref 70–99)
GLUCOSE BLDC GLUCOMTR-MCNC: 243 MG/DL — HIGH (ref 70–99)
GLUCOSE BLDC GLUCOMTR-MCNC: 361 MG/DL — HIGH (ref 70–99)
GLUCOSE SERPL-MCNC: 137 MG/DL — HIGH (ref 70–99)
HCT VFR BLD CALC: 20.5 % — CRITICAL LOW (ref 39–50)
HCT VFR BLD CALC: 20.9 % — CRITICAL LOW (ref 39–50)
HGB BLD-MCNC: 7.1 G/DL — LOW (ref 13–17)
HGB BLD-MCNC: 7.4 G/DL — LOW (ref 13–17)
MCHC RBC-ENTMCNC: 26.6 PG — LOW (ref 27–34)
MCHC RBC-ENTMCNC: 26.8 PG — LOW (ref 27–34)
MCHC RBC-ENTMCNC: 34.6 GM/DL — SIGNIFICANT CHANGE UP (ref 32–36)
MCHC RBC-ENTMCNC: 35.4 GM/DL — SIGNIFICANT CHANGE UP (ref 32–36)
MCV RBC AUTO: 75.7 FL — LOW (ref 80–100)
MCV RBC AUTO: 76.8 FL — LOW (ref 80–100)
NRBC # BLD: 0 /100 WBCS — SIGNIFICANT CHANGE UP (ref 0–0)
NRBC # BLD: 0 /100 WBCS — SIGNIFICANT CHANGE UP (ref 0–0)
PLATELET # BLD AUTO: 285 K/UL — SIGNIFICANT CHANGE UP (ref 150–400)
PLATELET # BLD AUTO: 294 K/UL — SIGNIFICANT CHANGE UP (ref 150–400)
POTASSIUM SERPL-MCNC: 4.4 MMOL/L — SIGNIFICANT CHANGE UP (ref 3.5–5.3)
POTASSIUM SERPL-SCNC: 4.4 MMOL/L — SIGNIFICANT CHANGE UP (ref 3.5–5.3)
RBC # BLD: 2.67 M/UL — LOW (ref 4.2–5.8)
RBC # BLD: 2.76 M/UL — LOW (ref 4.2–5.8)
RBC # FLD: 16.8 % — HIGH (ref 10.3–14.5)
RBC # FLD: 17 % — HIGH (ref 10.3–14.5)
SODIUM SERPL-SCNC: 137 MMOL/L — SIGNIFICANT CHANGE UP (ref 135–145)
WBC # BLD: 13.28 K/UL — HIGH (ref 3.8–10.5)
WBC # BLD: 13.44 K/UL — HIGH (ref 3.8–10.5)
WBC # FLD AUTO: 13.28 K/UL — HIGH (ref 3.8–10.5)
WBC # FLD AUTO: 13.44 K/UL — HIGH (ref 3.8–10.5)

## 2023-04-16 PROCEDURE — 99233 SBSQ HOSP IP/OBS HIGH 50: CPT

## 2023-04-16 RX ADMIN — CILOSTAZOL 100 MILLIGRAM(S): 100 TABLET ORAL at 05:28

## 2023-04-16 RX ADMIN — Medication 100 MILLIGRAM(S): at 05:26

## 2023-04-16 RX ADMIN — Medication 100 MILLIGRAM(S): at 11:23

## 2023-04-16 RX ADMIN — Medication 100 MILLIGRAM(S): at 17:46

## 2023-04-16 RX ADMIN — ATORVASTATIN CALCIUM 40 MILLIGRAM(S): 80 TABLET, FILM COATED ORAL at 22:10

## 2023-04-16 RX ADMIN — CHLORHEXIDINE GLUCONATE 1 APPLICATION(S): 213 SOLUTION TOPICAL at 11:26

## 2023-04-16 RX ADMIN — Medication 10: at 12:12

## 2023-04-16 RX ADMIN — Medication 10 UNIT(S): at 12:14

## 2023-04-16 RX ADMIN — Medication 50 MILLIGRAM(S): at 05:28

## 2023-04-16 RX ADMIN — SENNA PLUS 2 TABLET(S): 8.6 TABLET ORAL at 22:10

## 2023-04-16 RX ADMIN — Medication 4: at 17:31

## 2023-04-16 RX ADMIN — PANTOPRAZOLE SODIUM 40 MILLIGRAM(S): 20 TABLET, DELAYED RELEASE ORAL at 05:27

## 2023-04-16 RX ADMIN — Medication 100 MILLIGRAM(S): at 18:44

## 2023-04-16 RX ADMIN — Medication 60 MILLIGRAM(S): at 05:28

## 2023-04-16 RX ADMIN — Medication 81 MILLIGRAM(S): at 11:26

## 2023-04-16 RX ADMIN — ISOSORBIDE MONONITRATE 30 MILLIGRAM(S): 60 TABLET, EXTENDED RELEASE ORAL at 11:26

## 2023-04-16 RX ADMIN — CLOPIDOGREL BISULFATE 75 MILLIGRAM(S): 75 TABLET, FILM COATED ORAL at 11:25

## 2023-04-16 RX ADMIN — INSULIN GLARGINE 22 UNIT(S): 100 INJECTION, SOLUTION SUBCUTANEOUS at 22:10

## 2023-04-16 RX ADMIN — CILOSTAZOL 100 MILLIGRAM(S): 100 TABLET ORAL at 17:33

## 2023-04-16 RX ADMIN — Medication 2: at 08:17

## 2023-04-16 RX ADMIN — Medication 100 MILLIGRAM(S): at 22:11

## 2023-04-16 RX ADMIN — Medication 1 TABLET(S): at 11:25

## 2023-04-16 NOTE — PROGRESS NOTE ADULT - PROBLEM SELECTOR PLAN 1
new fever to 101.7 on 4/13 AM with worsening leukocytosis to 17K. meets sepsis criteria.   - still having "low grade temps" (high 99's currently), which likely explains ongoing episodic sinus tachycardia.   - CXR with no evidence of pneumonia  - UA negative  - 4/13 blood cx with NGTD  - 4/12 OR bone cx with GPCs, staph simulans, and citrobacter freundii -  continue to follow  - ID following - added doxy 100 BID, continue cipro and flagyl for now.   - follow OR cultures  - seems volume status at goal  - if remains stable by tomorrow, consider discharge to home on oral anbx

## 2023-04-16 NOTE — PROGRESS NOTE ADULT - PROBLEM SELECTOR PLAN 5
Unclear baseline.   - Cr overall stable/ slightly uptrending  - renal ultrasound with no hydro  - Nephrology consulted, recs appreciated  - renally dose meds to GFR, avoid nephrotoxic agents  - monitor labs

## 2023-04-16 NOTE — PROGRESS NOTE ADULT - PROBLEM SELECTOR PLAN 3
VA arterial duplex showing mod severe disease.   - s/p peripheral angio 4/7/2023 revealing  of SFA bilaterally s/p DCBA and stenting of R SFA  - Vascular Cardiology consult appreciated  - patient with known PAD with CLI [place] Classification 6 with bilateral  of the SFAs,   - s/p DCBA and stent to the distal R SFA 4/7/23   - now s/p stent to L SFA on 4/10  - c/w DAPT, cilostazol

## 2023-04-16 NOTE — PROGRESS NOTE ADULT - PROBLEM SELECTOR PLAN 4
Pt with stay at OSH on iv abx seen by pod and ID there.   - wound cx grew Citrobacter, staph simulans & helcococcus yassine, and poryphyromonas  - now s/p bilateral partial first and second ray resection, closed, EBL 30cc, patient bled moderately on left but inadequately on right, high concern for viability, low concern for residual infection as bone quality at level of resection was hard and adequate with Podiatry on 4/12  - f/u clean bone blood cx obtained in OR 4/12 - growing GPCs  - ID re-consulted re: need for abx on d/c, recs appreciated  - discussed with ID Attending Dr. oN, can monitor off abx given chronic dry gangrene  - IV aztreonam discontinued on 4/12. however with new fever on 4/13. abx plan as above

## 2023-04-16 NOTE — PROGRESS NOTE ADULT - SUBJECTIVE AND OBJECTIVE BOX
[facility] Division of Hospital Medicine  Juvenal Krause MD, JEFF  I'm reachable on Microsoft Teams   Off hours:  [phone] ([facility] pager)    Patient is a 72y old  Male who presents with a chief complaint of Transferred from [facility] for possible vascular intervention. (15 Apr 2023 14:06)      SUBJECTIVE / OVERNIGHT EVENTS:  No events overnight. No acute complaints.     MEDICATIONS  (STANDING):  aspirin enteric coated 81 milliGRAM(s) Oral daily  atorvastatin 40 milliGRAM(s) Oral at bedtime  chlorhexidine 2% Cloths 1 Application(s) Topical daily  cilostazol 100 milliGRAM(s) Oral two times a day  ciprofloxacin   IVPB 200 milliGRAM(s) IV Intermittent every 12 hours  clopidogrel Tablet 75 milliGRAM(s) Oral daily  dextrose 5%. 1000 milliLiter(s) (100 mL/Hr) IV Continuous <Continuous>  dextrose 5%. 1000 milliLiter(s) (50 mL/Hr) IV Continuous <Continuous>  dextrose 50% Injectable 25 Gram(s) IV Push once  dextrose 50% Injectable 12.5 Gram(s) IV Push once  doxycycline monohydrate Capsule 100 milliGRAM(s) Oral every 12 hours  glucagon  Injectable 1 milliGRAM(s) IntraMuscular once  insulin glargine Injectable (LANTUS) 22 Unit(s) SubCutaneous at bedtime  insulin lispro (ADMELOG) corrective regimen sliding scale   SubCutaneous three times a day before meals  insulin lispro (ADMELOG) corrective regimen sliding scale   SubCutaneous at bedtime  insulin lispro Injectable (ADMELOG) 10 Unit(s) SubCutaneous three times a day before meals  isosorbide   mononitrate ER Tablet (IMDUR) 30 milliGRAM(s) Oral daily  metoprolol succinate ER 50 milliGRAM(s) Oral daily  metroNIDAZOLE  IVPB 500 milliGRAM(s) IV Intermittent every 12 hours  Nephro-dorita 1 Tablet(s) Oral daily  NIFEdipine XL 60 milliGRAM(s) Oral daily  pantoprazole    Tablet 40 milliGRAM(s) Oral before breakfast  senna 2 Tablet(s) Oral at bedtime  sodium chloride 0.9%. 1000 milliLiter(s) (75 mL/Hr) IV Continuous <Continuous>    MEDICATIONS  (PRN):  acetaminophen  300 mG/codeine 30 mG 1 Tablet(s) Oral every 4 hours PRN Mild Pain (1 - 3)  dextrose Oral Gel 15 Gram(s) Oral once PRN Blood Glucose LESS THAN 70 milliGRAM(s)/deciliter  oxycodone    5 mG/acetaminophen 325 mG 1 Tablet(s) Oral every 6 hours PRN Severe Pain (7 - 10)    CAPILLARY BLOOD GLUCOSE      POCT Blood Glucose.: 157 mg/dL (16 Apr 2023 07:30)  POCT Blood Glucose.: 113 mg/dL (15 Apr 2023 21:32)  POCT Blood Glucose.: 155 mg/dL (15 Apr 2023 16:34)  POCT Blood Glucose.: 314 mg/dL (15 Apr 2023 12:15)    I&O's Summary      PHYSICAL EXAM:  Vital Signs Last 24 Hrs  T(C): 37.4 (16 Apr 2023 05:26), Max: 37.6 (15 Apr 2023 12:17)  T(F): 99.4 (16 Apr 2023 05:26), Max: 99.7 (15 Apr 2023 12:17)  HR: 104 (16 Apr 2023 05:26) (89 - 118)  BP: 134/67 (16 Apr 2023 05:26) (134/67 - 144/79)  BP(mean): --  RR: 18 (16 Apr 2023 05:26) (18 - 18)  SpO2: 95% (16 Apr 2023 05:26) (94% - 98%)    Parameters below as of 16 Apr 2023 05:26  Patient On (Oxygen Delivery Method): room air    CONSTITUTIONAL: NAD, well-developed, well-groomed  EYES: conjunctiva and sclera clear  ENMT: Moist oral mucosa  NECK: Supple  RESPIRATORY: Normal respiratory effort; lungs are clear to auscultation bilaterally  CARDIOVASCULAR: Regular rate and rhythm, normal S1 and S2, no murmur/rub/gallop; No lower extremity edema  ABDOMEN: Soft, Nondistended, Nontender to palpation, normoactive bowel sounds  MUSCULOSKELETAL: No clubbing or cyanosis of digits; b/l feet in post-op dressing c/d/i  PSYCH: A+O to person, place, and time; affect appropriate  NEUROLOGY: CN 2-12 are intact and symmetric; no gross sensory deficits   SKIN: No rashes; no palpable lesions, b/l venous stasis changes, b/l groin sites c/d/i       LABS:                        7.4    13.44 )-----------( 294      ( 16 Apr 2023 09:29 )             20.9     04-16    137  |  106  |  58<H>  ----------------------------<  137<H>  4.4   |  18<L>  |  2.79<H>    Ca    8.4      16 Apr 2023 07:13  Phos  4.3     04-15  Mg     2.1     04-15

## 2023-04-16 NOTE — PROGRESS NOTE ADULT - SUBJECTIVE AND OBJECTIVE BOX
DATE OF SERVICE: 04-16-23 @ 11:45    Patient is a 72y old  Male who presents with a chief complaint of Transferred from [facility] for possible vascular intervention. (16 Apr 2023 10:59)      INTERVAL HISTORY: no complaints     REVIEW OF SYSTEMS:  CONSTITUTIONAL: No weakness  EYES/ENT: No visual changes;  No throat pain   NECK: No pain or stiffness  RESPIRATORY: No cough, wheezing; No shortness of breath  CARDIOVASCULAR: No chest pain or palpitations  GASTROINTESTINAL: No abdominal  pain. No nausea, vomiting, or hematemesis  GENITOURINARY: No dysuria, frequency or hematuria  NEUROLOGICAL: No stroke like symptoms  SKIN: No rashes      MEDICATIONS:  isosorbide   mononitrate ER Tablet (IMDUR) 30 milliGRAM(s) Oral daily  metoprolol succinate ER 50 milliGRAM(s) Oral daily  NIFEdipine XL 60 milliGRAM(s) Oral daily        PHYSICAL EXAM:  T(C): 37.3 (04-16-23 @ 11:21), Max: 37.6 (04-15-23 @ 12:17)  HR: 109 (04-16-23 @ 11:21) (89 - 118)  BP: 125/67 (04-16-23 @ 11:21) (125/67 - 144/79)  RR: 18 (04-16-23 @ 11:21) (18 - 18)  SpO2: 95% (04-16-23 @ 11:21) (94% - 98%)  Wt(kg): --  I&O's Summary        Appearance: In no distress	  HEENT:    PERRL, EOMI	  Cardiovascular:  S1 S2, No JVD  Respiratory: Lungs clear to auscultation	  Gastrointestinal:  Soft, Non-tender, + BS	  Vascularature:  No edema of LE  Psychiatric: Appropriate affect   Neuro: no acute focal deficits                               7.4    13.44 )-----------( 294      ( 16 Apr 2023 09:29 )             20.9     04-16    137  |  106  |  58<H>  ----------------------------<  137<H>  4.4   |  18<L>  |  2.79<H>    Ca    8.4      16 Apr 2023 07:13  Phos  4.3     04-15  Mg     2.1     04-15          Labs personally reviewed      ASSESSMENT/PLAN: 	  73 y/o M--patient with a history of type 2 DM complicated with CKD4, PAD with unclear past stenting hx in the RLE, essential HTN tobacco use,  transferred from [facility] 3/30/23 with spouse reporting the patient had lost his R hallux toenail about a week prior to that admission and noted dark discoloration of the R hallux and 2nd toe with foul odor emanating from the R foot    #Pre-op risk stratification  - S/p peripheral angio 4/7/2023 revealing  of SFA bilaterally s/p DCBA and stenting of R SFA. Tolerated procedure well.   - Plan for stent to L SFA Monday 4/10 with vascular team.   - Pt displays no evidence of coronary ischemia  - TTE shows normal LV systolic function with no significant structural abnormalities.   - From a cardiac standpoint, Pt is optimized for vascular intervention  - Pt is intermediate to moderate risk for desired procedure  - Further recommendations to follow pending clinical course.   - s/p partial amputation of both 1st metatarsals 4/12-- tolerated surgery well    #PAD  - Continue Cilostazol, and DAPT therapy  - Statin therapy  - Vascular input appreciated    #CKD  - Renal input appreciated    #CAD  - S/p PCI   -Continue Plavix, statin and BB    # Sinus Tachycardia  - HR elevated 100-120  - Possibly pre-renal post operatively vs reactive to infection (ID following) --> agree with trial if IVF  - Patient asymptomatic. Will cont to monitor on tele.  - If HR remains elevated, consider D-Dimer, B/L LE US r/o DVT, VQ scan  - 4/15 5 beats WCT. Asymptomatic. Likely i/s/i infection  A-paced. No intervention              ANTWON Villela DO [facility]  Cardiovascular Medicine  [address], [address]  Office: [phone]  Available via call/text on [employer] Teams

## 2023-04-17 ENCOUNTER — TRANSCRIPTION ENCOUNTER (OUTPATIENT)
Age: 73
End: 2023-04-17

## 2023-04-17 LAB
ANION GAP SERPL CALC-SCNC: 12 MMOL/L — SIGNIFICANT CHANGE UP (ref 5–17)
BLD GP AB SCN SERPL QL: NEGATIVE — SIGNIFICANT CHANGE UP
BUN SERPL-MCNC: 55 MG/DL — HIGH (ref 7–23)
CALCIUM SERPL-MCNC: 8.8 MG/DL — SIGNIFICANT CHANGE UP (ref 8.4–10.5)
CHLORIDE SERPL-SCNC: 107 MMOL/L — SIGNIFICANT CHANGE UP (ref 96–108)
CO2 SERPL-SCNC: 21 MMOL/L — LOW (ref 22–31)
CREAT SERPL-MCNC: 2.65 MG/DL — HIGH (ref 0.5–1.3)
CULTURE RESULTS: SIGNIFICANT CHANGE UP
EGFR: 25 ML/MIN/1.73M2 — LOW
GLUCOSE BLDC GLUCOMTR-MCNC: 107 MG/DL — HIGH (ref 70–99)
GLUCOSE BLDC GLUCOMTR-MCNC: 154 MG/DL — HIGH (ref 70–99)
GLUCOSE BLDC GLUCOMTR-MCNC: 302 MG/DL — HIGH (ref 70–99)
GLUCOSE BLDC GLUCOMTR-MCNC: 308 MG/DL — HIGH (ref 70–99)
GLUCOSE SERPL-MCNC: 126 MG/DL — HIGH (ref 70–99)
HCT VFR BLD CALC: 21 % — CRITICAL LOW (ref 39–50)
HGB BLD-MCNC: 7.4 G/DL — LOW (ref 13–17)
MCHC RBC-ENTMCNC: 26.9 PG — LOW (ref 27–34)
MCHC RBC-ENTMCNC: 35.2 GM/DL — SIGNIFICANT CHANGE UP (ref 32–36)
MCV RBC AUTO: 76.4 FL — LOW (ref 80–100)
NRBC # BLD: 0 /100 WBCS — SIGNIFICANT CHANGE UP (ref 0–0)
ORGANISM # SPEC MICROSCOPIC CNT: SIGNIFICANT CHANGE UP
PLATELET # BLD AUTO: 297 K/UL — SIGNIFICANT CHANGE UP (ref 150–400)
POTASSIUM SERPL-MCNC: 4.1 MMOL/L — SIGNIFICANT CHANGE UP (ref 3.5–5.3)
POTASSIUM SERPL-SCNC: 4.1 MMOL/L — SIGNIFICANT CHANGE UP (ref 3.5–5.3)
RBC # BLD: 2.75 M/UL — LOW (ref 4.2–5.8)
RBC # FLD: 16.8 % — HIGH (ref 10.3–14.5)
RH IG SCN BLD-IMP: POSITIVE — SIGNIFICANT CHANGE UP
SODIUM SERPL-SCNC: 140 MMOL/L — SIGNIFICANT CHANGE UP (ref 135–145)
SPECIMEN SOURCE: SIGNIFICANT CHANGE UP
WBC # BLD: 11.93 K/UL — HIGH (ref 3.8–10.5)
WBC # FLD AUTO: 11.93 K/UL — HIGH (ref 3.8–10.5)

## 2023-04-17 PROCEDURE — 99233 SBSQ HOSP IP/OBS HIGH 50: CPT

## 2023-04-17 PROCEDURE — 99232 SBSQ HOSP IP/OBS MODERATE 35: CPT

## 2023-04-17 RX ADMIN — PANTOPRAZOLE SODIUM 40 MILLIGRAM(S): 20 TABLET, DELAYED RELEASE ORAL at 05:36

## 2023-04-17 RX ADMIN — CILOSTAZOL 100 MILLIGRAM(S): 100 TABLET ORAL at 17:15

## 2023-04-17 RX ADMIN — CILOSTAZOL 100 MILLIGRAM(S): 100 TABLET ORAL at 05:36

## 2023-04-17 RX ADMIN — INSULIN GLARGINE 22 UNIT(S): 100 INJECTION, SOLUTION SUBCUTANEOUS at 22:28

## 2023-04-17 RX ADMIN — Medication 10 UNIT(S): at 08:21

## 2023-04-17 RX ADMIN — CLOPIDOGREL BISULFATE 75 MILLIGRAM(S): 75 TABLET, FILM COATED ORAL at 12:36

## 2023-04-17 RX ADMIN — Medication 100 MILLIGRAM(S): at 09:41

## 2023-04-17 RX ADMIN — Medication 2: at 08:20

## 2023-04-17 RX ADMIN — Medication 1 TABLET(S): at 12:35

## 2023-04-17 RX ADMIN — Medication 60 MILLIGRAM(S): at 05:36

## 2023-04-17 RX ADMIN — SENNA PLUS 2 TABLET(S): 8.6 TABLET ORAL at 22:30

## 2023-04-17 RX ADMIN — Medication 1 TABLET(S): at 22:36

## 2023-04-17 RX ADMIN — Medication 8: at 17:13

## 2023-04-17 RX ADMIN — Medication 81 MILLIGRAM(S): at 12:35

## 2023-04-17 RX ADMIN — Medication 10 UNIT(S): at 17:14

## 2023-04-17 RX ADMIN — Medication 50 MILLIGRAM(S): at 05:36

## 2023-04-17 RX ADMIN — ISOSORBIDE MONONITRATE 30 MILLIGRAM(S): 60 TABLET, EXTENDED RELEASE ORAL at 12:35

## 2023-04-17 RX ADMIN — Medication 100 MILLIGRAM(S): at 05:36

## 2023-04-17 RX ADMIN — Medication 100 MILLIGRAM(S): at 07:00

## 2023-04-17 RX ADMIN — ATORVASTATIN CALCIUM 40 MILLIGRAM(S): 80 TABLET, FILM COATED ORAL at 22:30

## 2023-04-17 NOTE — DISCHARGE NOTE NURSING/CASE MANAGEMENT/SOCIAL WORK - PATIENT PORTAL LINK FT
You can access the FollowMyHealth Patient Portal offered by [facility] by registering at the following website: [email or web address]. By joining [employer]’s FollowMyHealth portal, you will also be able to view your health information using other applications (apps) compatible with our system.

## 2023-04-17 NOTE — PROGRESS NOTE ADULT - SUBJECTIVE AND OBJECTIVE BOX
INTERVAL HISTORY: Overnight event noted       MEDICATIONS:  aspirin enteric coated 81 milliGRAM(s) Oral daily  cilostazol 100 milliGRAM(s) Oral two times a day  clopidogrel Tablet 75 milliGRAM(s) Oral daily  isosorbide   mononitrate ER Tablet (IMDUR) 30 milliGRAM(s) Oral daily  metoprolol succinate ER 50 milliGRAM(s) Oral daily  NIFEdipine XL 60 milliGRAM(s) Oral daily  ciprofloxacin   IVPB 200 milliGRAM(s) IV Intermittent every 12 hours  doxycycline monohydrate Capsule 100 milliGRAM(s) Oral every 12 hours  metroNIDAZOLE  IVPB 500 milliGRAM(s) IV Intermittent every 12 hours  acetaminophen  300 mG/codeine 30 mG 1 Tablet(s) Oral every 4 hours PRN  oxycodone    5 mG/acetaminophen 325 mG 1 Tablet(s) Oral every 6 hours PRN  pantoprazole    Tablet 40 milliGRAM(s) Oral before breakfast  senna 2 Tablet(s) Oral at bedtime  atorastatin 40 milliGRAM(s) Oral at bedtime  dextrose 50% Injectable 25 Gram(s) IV Push once  dextrose 50% Injectable 12.5 Gram(s) IV Push once  dextrose Oral Gel 15 Gram(s) Oral once PRN  glucagon  Injectable 1 milliGRAM(s) IntraMuscular once  insulin glargine Injectable (LANTUS) 22 Unit(s) SubCutaneous at bedtime  insulin lispro (ADMELOG) corrective regimen sliding scale   SubCutaneous three times a day before meals  insulin lispro (ADMELOG) corrective regimen sliding scale   SubCutaneous at bedtime  insulin lispro Injectable (ADMELOG) 10 Unit(s) SubCutaneous three times a day before meals  chlorhexidine 2% Cloths 1 Application(s) Topical daily  dextrose 5%. 1000 milliLiter(s) IV Continuous <Continuous>  dextrose 5%. 1000 milliLiter(s) IV Continuous <Continuous>  Nephro-dorita 1 Tablet(s) Oral daily  sodium chloride 0.9%. 1000 milliLiter(s) IV Continuous <Continuous>      PAST MEDICAL & SURGICAL HISTORY:  Essential hypertension  Hyperlipidemia, unspecified hyperlipidemia type  CAD (coronary artery disease)  PVD (peripheral vascular disease) with claudication  Stented coronary artery  Type 2 diabetes mellitus  Stage 4 chronic kidney disease  COVID-19 vaccination refused  Stented coronary artery  PAD (peripheral artery disease)    FAMILY HISTORY:  Family history of essential hypertension      SOCIAL HISTORY:  unchanged    REVIEW OF SYSTEMS:  12 point ROS completed negative unless stated in HPI    [ x] All others negative	  [ ] Unable to obtain    PHYSICAL EXAM:  T(C): 36.9 (04-17-23 @ 05:35), Max: 37.3 (04-16-23 @ 11:21)  HR: 87 (04-17-23 @ 05:35) (87 - 114)  BP: 144/75 (04-17-23 @ 05:35) (125/67 - 157/77)  RR: 18 (04-17-23 @ 05:35) (18 - 18)  SpO2: 99% (04-17-23 @ 05:35) (95% - 99%)  Wt(kg): --  I&O's Summary    16 Apr 2023 07:01  -  17 Apr 2023 07:00  --------------------------------------------------------  IN: 240 mL / OUT: 480 mL / NET: -240 mL    Appearance: Normal	  HEENT:   Normal oral mucosa, PERRL, EOMI	  Lymphatic: No lymphadenopathy  Cardiovascular: Normal S1 S2, No JVD, No murmurs, No edema  Respiratory: Lungs clear to auscultation	  Psychiatry: A & O x 3, Mood & affect appropriate  Gastrointestinal:  Soft, Non-tender, + BS	  Skin: No rashes, No ecchymoses, No cyanosis	  Neurologic: Non-focal  Vascular Pulse Exam: Audible bilateral DP and PT signals  Foot Exam: recent metatarsal amputation, skin coapted well       LABS:	 	    CBC Full  -  ( 17 Apr 2023 06:28 )  WBC Count : 11.93 K/uL  Hemoglobin : 7.4 g/dL  Hematocrit : 21.0 %  Platelet Count - Automated : 297 K/uL  Mean Cell Volume : 76.4 fl  Mean Cell Hemoglobin : 26.9 pg  Mean Cell Hemoglobin Concentration : 35.2 gm/dL  Auto Neutrophil # : x  Auto Lymphocyte # : x  Auto Monocyte # : x  Auto Eosinophil # : x  Auto Basophil # : x  Auto Neutrophil % : x  Auto Lymphocyte % : x  Auto Monocyte % : x  Auto Eosinophil % : x  Auto Basophil % : x    04-17    140  |  107  |  55<H>  ----------------------------<  126<H>  4.1   |  21<L>  |  2.65<H>  04-16    137  |  106  |  58<H>  ----------------------------<  137<H>  4.4   |  18<L>  |  2.79<H>    Ca    8.8      17 Apr 2023 06:28  Ca    8.4      16 Apr 2023 07:13

## 2023-04-17 NOTE — PROGRESS NOTE ADULT - SUBJECTIVE AND OBJECTIVE BOX
Patient is a 72y old  Male who presents with a chief complaint of Transferred from [facility] for possible vascular intervention. (17 Apr 2023 10:46)       INTERVAL HPI/OVERNIGHT EVENTS:  Patient seen and evaluated at bedside.  Pt is resting comfortable in NAD. Denies N/V/F/C.  Pain rated at X/10    Allergies    penicillin (Unknown)    Intolerances        Vital Signs Last 24 Hrs  T(C): 37.2 (17 Apr 2023 11:25), Max: 37.2 (16 Apr 2023 21:56)  T(F): 99 (17 Apr 2023 11:25), Max: 99 (17 Apr 2023 11:25)  HR: 102 (17 Apr 2023 11:25) (87 - 114)  BP: 144/73 (17 Apr 2023 11:25) (144/73 - 157/77)  BP(mean): --  RR: 18 (17 Apr 2023 11:25) (18 - 18)  SpO2: 96% (17 Apr 2023 11:25) (96% - 99%)    Parameters below as of 17 Apr 2023 11:25  Patient On (Oxygen Delivery Method): room air        LABS:                        7.4    11.93 )-----------( 297      ( 17 Apr 2023 06:28 )             21.0     04-17    140  |  107  |  55<H>  ----------------------------<  126<H>  4.1   |  21<L>  |  2.65<H>    Ca    8.8      17 Apr 2023 06:28          CAPILLARY BLOOD GLUCOSE      POCT Blood Glucose.: 302 mg/dL (17 Apr 2023 11:50)  POCT Blood Glucose.: 154 mg/dL (17 Apr 2023 07:50)  POCT Blood Glucose.: 223 mg/dL (16 Apr 2023 21:55)  POCT Blood Glucose.: 243 mg/dL (16 Apr 2023 16:39)      Lower Extremity Physical Exam:  Vascular: DP/PT 0/4, B/L, CFT <3 seconds B/L, Temperature gradient warm to cool, B/L.   Neuro: Epicritic sensation diminished to the level of the toes, B/L.  Musculoskeletal/Ortho: Unremarkable.  Skin: 4/12 s/p b/l P12RR: Right foot: skin edges well coapted, warm, no early ischemic changes, sutures intact, no signs of infection. Left foot: well coapted, viable, no early ischemic changes, no signs of infection    RADIOLOGY & ADDITIONAL TESTS:

## 2023-04-17 NOTE — CHART NOTE - NSCHARTNOTEFT_GEN_A_CORE
Due to patient's deconditioning and generalised weakness secondary to bilateral metatarsal amputation. Patient will require wheelchair. Patient needs the wheelchair to perform ADLs safely within [facility] and to achieve daily tasks and therapies which cannot be achieved with the use of a cane or rolling walker. Patient and family is in agreement with wheelchair use at home and assistance will be provided if needed.
Nutrition Follow Up Note  Patient seen for: initial malnutrition follow up   Chart reviewed, events noted.    "73 yo male with PMH of CKD4, PAD s/p stent, CAD, HTN, T2DM, current smoker who p/w LE dry gangrene and osteomyelitis now s/p R SFA stent on 4/7 and L SFA stent on 4/10 now planned for OR for amputation with podiatry on 4/12/23."    Source: [x] Patient       [x] EMR        [] RN        [] Family at bedside       [] Other:    -If unable to interview patient: [] Trach/Vent/BiPAP  [] Disoriented/confused/inappropriate to interview    Diet Order:   Diet, NPO after Midnight:      NPO Start Date: 11-Apr-2023,   NPO Start Time: 23:59 (04-11-23)  Diet, Regular:   Consistent Carbohydrate {No Snacks} (CSTCHO)  DASH/TLC {Sodium & Cholesterol Restricted} (DASH)  No Concentrated Potassium  No Concentrated Phosphorus  Supplement Feeding Modality:  Oral  Nepro Cans or Servings Per Day:  2       Frequency:  Daily (04-07-23)    - Is current order appropriate/adequate? [] Yes  []  No:     - PO intake :   [] >75%  Adequate    [] 50-75%  Fair       [x] <50%  Poor  25-50% meals per pt report ; reports because appetite/disliking institutional food     - Nutrition-related concerns:  - reduced appetite/PO intake 25-50% meals   - elevated BG (4/11 <H>), POCT (4/11 231-338); ordered for consistent CHO diet in house and insulin   -  "elevated SCr; likely CKD" per nephrology; continue to monitor electrolytes and adjust diet as needed/indicated     GI:  Last BM today, per pt   Bowel Regimen? [x] Yes- senna   [] No  Pt denies nausea, vomiting, diarrhea, or constipation at this time.     Weights:   Daily N/A  Drug Dosing Weight  Height (cm): 172.7 (07 Apr 2023 07:22)  Weight (kg): 66 (07 Apr 2023 07:22)  BMI (kg/m2): 22.1 (07 Apr 2023 07:22)  BSA (m2): 1.79 (07 Apr 2023 07:22)    Nutritionally Pertinent MEDICATIONS  (STANDING):  atorvastatin  aztreonam  IVPB  aztreonam  IVPB  dextrose 5%.  dextrose 5%.  dextrose 50% Injectable  dextrose 50% Injectable  dextrose 50% Injectable  glucagon  Injectable  insulin glargine Injectable (LANTUS)  insulin lispro (ADMELOG) corrective regimen sliding scale  insulin lispro (ADMELOG) corrective regimen sliding scale  insulin lispro Injectable (ADMELOG)  isosorbide   mononitrate ER Tablet (IMDUR)  metoprolol succinate ER  pantoprazole    Tablet  senna  sodium chloride 0.9%.    Pertinent Labs: 04-11 @ 06:06: Na 140, BUN 50<H>, Cr 2.24<H>, <H>, K+ 4.2, Phos --, Mg --, Alk Phos --, ALT/SGPT --, AST/SGOT --, HbA1c --    A1C with Estimated Average Glucose Result: 5.1 % (04-05-23 @ 07:26)    Finger Sticks:  POCT Blood Glucose.: 338 mg/dL (04-11 @ 11:38)  POCT Blood Glucose.: 231 mg/dL (04-11 @ 07:49)  POCT Blood Glucose.: 256 mg/dL (04-10 @ 21:22)  POCT Blood Glucose.: 268 mg/dL (04-10 @ 16:04)    Skin per nursing documentation: no noted pressure injuries as per flowsheets   Edema: No noted edema as per flow sheets.     Estimated Needs:   [x] no change since previous assessment  [] recalculated:     Previous Nutrition Diagnosis: acute on chronic moderate protein calorie malnutrition; Food & Nutrition Related Knowledge Deficit  Nutrition Diagnosis is: [x] ongoing  [] resolved [] not applicable     New Nutrition Diagnosis: [x] Not applicable    Nutrition Care Plan:  [x] In Progress- addressed with diet order, PO encouragement and nutritional supplements   [] Achieved  [] Not applicable    Nutrition Interventions:     Education Provided:       [] Yes:  [] No: Encouraged adequate consumption of meals/supplements to optimize protein-energy intake        Recommendations:      -  Continue consistent CHO DASH diet; consider discontinue no conc K+, phosphorus per WNL at this time   -- continue to monitor labs and adjust diet as needed/indicated    - Nepro 2x daily (850 kcals, 38 g protein).   - If not medically contraindicated, recommend Nephro-dorita daily  - Provide assistance with meals as needed to promote adequate PO intake   - Encourage adequate consumption of meals/supplements to optimize protein-energy intake      Monitoring and Evaluation:   Continue to monitor nutritional intake, tolerance to diet prescription, weights, labs, skin integrity      RD remains available upon request and will follow up per protocol  Anastasiia Thomason RD CDN #[phone] or Teams (preferred)
The nephrology team was following the patient for CKD and need for procedures requiring IV contrast.   The patient has had stable renal function with SCr ranging between 2.2-2.5 during this hospital course.   he likely has underlying CKD from longstanding PVD and HTN.   Patient will need nephrology follow up following discharge, no further inpatient work up required at this time.     Nephrology will sign off.     If any questions, please feel free to contact me     Jerry Gamble  Nephrology Fellow  [facility] Pager: [phone]
Wound Care Team Note:    Request for wound care consult for foot wound received from team. Mr. white was seen and is being managed by Podiatry. Will defer to podiatry for management. Will not follow. Please refer any questions or concerns to podiatry.    Alisa Morrow, NP-C, CWOCN
Elevated blood sugars despite insulin adjustments.    POCT Blood Glucose.: 273 mg/dL (11 Apr 2023 17:53)  POCT Blood Glucose.: 338 mg/dL (11 Apr 2023 11:38)  POCT Blood Glucose.: 231 mg/dL (11 Apr 2023 07:49)  POCT Blood Glucose.: 256 mg/dL (10 Apr 2023 21:22)      Increased sliding scale to scale converage    [id number]

## 2023-04-17 NOTE — PROGRESS NOTE ADULT - PROBLEM SELECTOR PLAN 9
DVT ppx: hep subc    Dispo: f/u ID re: abx plans   PT rec [facility] but pt/wife prefers home.   CM working on DME and home PT
DVT ppx: hep subc    Dispo: pending completion of PT eval with [employer]    Offered to update wife several times, but patient declined. Prefers to update his wife and children himself.
DVT ppx: hep subc    Dispo: Once medically optimized (no fevers, stable Hg and improving WBC, stable Cr) plan for dc to home with [facility]    Wife at bedside updated today.
DVT ppx: hep subc    Dispo: pending completion of PT eval with [employer]    Offered to update wife several times, but patient declined. Prefers to update his wife and children himself.

## 2023-04-17 NOTE — DISCHARGE NOTE NURSING/CASE MANAGEMENT/SOCIAL WORK - NSDCFUADDAPPT_GEN_ALL_CORE_FT
Podiatry Discharge Instructions:  Follow up: Please follow up with Dr. Silvio Hernandez within 1 week of discharge from the hospital, please call [phone] for appointment and discuss that you recently were seen in the hospital.  Wound Care: Please leave your dressing clean dry intact until your follow up appointment  Weight bearing: Please weight bear as tolerated in a surgical shoe to b/l heel  Antibiotics: Please continue as instructed.

## 2023-04-17 NOTE — PROVIDER CONTACT NOTE (CRITICAL VALUE NOTIFICATION) - BACKGROUND
patient admitted 4/4/23 for unstable angina
Patient admitted for Atherosclerosis
Patient admitted for Atherosclerosis

## 2023-04-17 NOTE — PROGRESS NOTE ADULT - ASSESSMENT
Assessment:  1. CTLI with CLI [place] Classification 6 with bilateral  of the SFAs, s/p DCBA and stent to the distal RSFA 4/7/23, s/p LSFA stent and DCBA 4/10/23  2. Bilateral metatarsal amputation   3. Chronic osteomyelitis receiving abx   4. CAD s/p prior PCI  5. HTN  6. HLD  7. DM  8. CKD stage 4  9. Current Smoker      Plan:  1. Successful percutaneous revascularization of bilateral SFA's    2. Continue Plavix and ASA 81 mg. Cont Cilostazol.   3. Statin therapy   4. IV abx for chronic osteomyelitis- appreciate ID input          Please call with any questions:

## 2023-04-17 NOTE — CHART NOTE - NSCHARTNOTESELECT_GEN_ALL_CORE
Hyperglycemia/Event Note
Nutrition Services
Off Service Note
Wound Care Team Note:/Event Note
need for wheelchair/Event Note

## 2023-04-17 NOTE — PROGRESS NOTE ADULT - PROBLEM SELECTOR PLAN 3
VA arterial duplex showing mod severe disease.   - s/p peripheral angio 4/7/2023 revealing  of SFA bilaterally s/p DCBA and stenting of R SFA  - patient with known PAD with CLI [place] Classification 6 with bilateral  of the SFAs,   - now s/p stent to L SFA on 4/10  - c/w DAPT, cilostazol

## 2023-04-17 NOTE — PROGRESS NOTE ADULT - SUBJECTIVE AND OBJECTIVE BOX
[facility] Division of Hospital Medicine  Mary Saravia MD  Pager (M-F, [phone]): [phone]  Other Times:  [phone]    Patient is a 72y old  Male who presents with a chief complaint of Transferred from [facility] for possible vascular intervention. (17 Apr 2023 13:19)      SUBJECTIVE / OVERNIGHT EVENTS:  feeling well. denies any complaints. asking about when going home. forgetful   no acute overnight issues     ADDITIONAL REVIEW OF SYSTEMS: otherwise neg    MEDICATIONS  (STANDING):  aspirin enteric coated 81 milliGRAM(s) Oral daily  atorvastatin 40 milliGRAM(s) Oral at bedtime  chlorhexidine 2% Cloths 1 Application(s) Topical daily  cilostazol 100 milliGRAM(s) Oral two times a day  ciprofloxacin   IVPB 200 milliGRAM(s) IV Intermittent every 12 hours  clopidogrel Tablet 75 milliGRAM(s) Oral daily  dextrose 5%. 1000 milliLiter(s) (50 mL/Hr) IV Continuous <Continuous>  dextrose 5%. 1000 milliLiter(s) (100 mL/Hr) IV Continuous <Continuous>  dextrose 50% Injectable 25 Gram(s) IV Push once  dextrose 50% Injectable 12.5 Gram(s) IV Push once  doxycycline monohydrate Capsule 100 milliGRAM(s) Oral every 12 hours  glucagon  Injectable 1 milliGRAM(s) IntraMuscular once  insulin glargine Injectable (LANTUS) 22 Unit(s) SubCutaneous at bedtime  insulin lispro (ADMELOG) corrective regimen sliding scale   SubCutaneous three times a day before meals  insulin lispro (ADMELOG) corrective regimen sliding scale   SubCutaneous at bedtime  insulin lispro Injectable (ADMELOG) 10 Unit(s) SubCutaneous three times a day before meals  isosorbide   mononitrate ER Tablet (IMDUR) 30 milliGRAM(s) Oral daily  metoprolol succinate ER 50 milliGRAM(s) Oral daily  metroNIDAZOLE  IVPB 500 milliGRAM(s) IV Intermittent every 12 hours  Nephro-dorita 1 Tablet(s) Oral daily  NIFEdipine XL 60 milliGRAM(s) Oral daily  pantoprazole    Tablet 40 milliGRAM(s) Oral before breakfast  senna 2 Tablet(s) Oral at bedtime  sodium chloride 0.9%. 1000 milliLiter(s) (75 mL/Hr) IV Continuous <Continuous>    MEDICATIONS  (PRN):  acetaminophen  300 mG/codeine 30 mG 1 Tablet(s) Oral every 4 hours PRN Mild Pain (1 - 3)  dextrose Oral Gel 15 Gram(s) Oral once PRN Blood Glucose LESS THAN 70 milliGRAM(s)/deciliter  oxycodone    5 mG/acetaminophen 325 mG 1 Tablet(s) Oral every 6 hours PRN Severe Pain (7 - 10)      CAPILLARY BLOOD GLUCOSE      POCT Blood Glucose.: 302 mg/dL (17 Apr 2023 11:50)  POCT Blood Glucose.: 154 mg/dL (17 Apr 2023 07:50)  POCT Blood Glucose.: 223 mg/dL (16 Apr 2023 21:55)  POCT Blood Glucose.: 243 mg/dL (16 Apr 2023 16:39)    I&O's Summary    16 Apr 2023 07:01  -  17 Apr 2023 07:00  --------------------------------------------------------  IN: 240 mL / OUT: 480 mL / NET: -240 mL        PHYSICAL EXAM:  Vital Signs Last 24 Hrs  T(C): 37.2 (17 Apr 2023 11:25), Max: 37.2 (16 Apr 2023 21:56)  T(F): 99 (17 Apr 2023 11:25), Max: 99 (17 Apr 2023 11:25)  HR: 102 (17 Apr 2023 11:25) (87 - 114)  BP: 144/73 (17 Apr 2023 11:25) (144/73 - 157/77)  BP(mean): --  RR: 18 (17 Apr 2023 11:25) (18 - 18)  SpO2: 96% (17 Apr 2023 11:25) (96% - 99%)    Parameters below as of 17 Apr 2023 11:25  Patient On (Oxygen Delivery Method): room air        CONSTITUTIONAL: NAD   EYES:  conjunctiva and sclera clear  ENMT: Moist oral mucosa  NECK: Supple, no palpable masses; no JVD  RESPIRATORY: Normal respiratory effort; lungs are clear to auscultation bilaterally  CARDIOVASCULAR: Regular rate and rhythm,systolic murmur ; No lower extremity edema  ABDOMEN: Nontender to palpation, normoactive bowel sounds, no rebound/guarding  MUSCULOSKELETAL:  no clubbing or cyanosis of digits; no joint swelling or tenderness to palpation  PSYCH: A+O to person, place ; affect appropriate  SKIN: bilateral foot dressing in place. skin warm to touch., no cyanosis     LABS:                        7.4    11.93 )-----------( 297      ( 17 Apr 2023 06:28 )             21.0     04-17    140  |  107  |  55<H>  ----------------------------<  126<H>  4.1   |  21<L>  |  2.65<H>    Ca    8.8      17 Apr 2023 06:28                  RADIOLOGY & ADDITIONAL TESTS:  Results Reviewed:   Imaging Personally Reviewed:  Electrocardiogram Personally Reviewed:    COORDINATION OF CARE:  Care Discussed with Consultants/Other Providers [Y/N]:  Prior or Outpatient Records Reviewed [Y/N]:

## 2023-04-17 NOTE — PROGRESS NOTE ADULT - ATTENDING COMMENTS
CLTI s/p revasc bilateral SFA with residual BTK disease. s/p amputation and on antibx    -appreciate podiatry and ID input  -aggressive RF modification including DAPT, statin and diabetes control   -will need to arrange for follow-up near where he lives or arrange with social work for transportation to outpatient visits to ensure evaluation for clinical improvement of wounds

## 2023-04-17 NOTE — PROVIDER CONTACT NOTE (CRITICAL VALUE NOTIFICATION) - ASSESSMENT
Patient's vitals are stable. Patient denies chest pain, SOB, and lightheadedness. patient shows no signs of bleeding
patient asymptomatic. vss. no s/s of bleeding. denies chest pain
Patient Alert. Patient vitals are stable. Patient shows no signs of bleeding

## 2023-04-17 NOTE — PROGRESS NOTE ADULT - TIME BILLING
Time spent reviewing the chart documentation, reviewing labs and imaging studies, evaluating the patient, discussing the plan of care with the consultants & medical team, and documenting.
- Ordering, reviewing, and interpreting labs, testing, and imaging.  - Independently obtaining a review of systems and performing a physical exam  - Reviewing consultant documentation/recommendations in addition to discussing plan of care with consultants.  - Counselling and educating patient and family regarding interpretation of aforementioned items and plan of care.
- Ordering, reviewing, and interpreting labs, testing, and imaging.  - Independently obtaining a review of systems and performing a physical exam  - Reviewing consultant documentation/recommendations in addition to discussing plan of care with consultants.  - Counselling and educating patient and family regarding interpretation of aforementioned items and plan of care.

## 2023-04-17 NOTE — PROGRESS NOTE ADULT - PROBLEM SELECTOR PLAN 1
-appears dry  -s/p toe amputation by podiatry  -bactim SS 1 tab po daily   -dc other abx  -total 6 weeks abx from toe amputation to cover OM

## 2023-04-17 NOTE — PROGRESS NOTE ADULT - PROBLEM SELECTOR PLAN 3
-better   -possible soft tissue infection +/- OM, now s/p toe amputation   -dc doxy/cipro   -bcx negative

## 2023-04-17 NOTE — DISCHARGE NOTE NURSING/CASE MANAGEMENT/SOCIAL WORK - NSDCPEFALRISK_GEN_ALL_CORE
For information on Fall & Injury Prevention, visit: [email or web address] OR  [email or web address] OR  https://www.cdc.gov/steadi/patient.html

## 2023-04-17 NOTE — PROGRESS NOTE ADULT - PROBLEM SELECTOR PLAN 1
new fever to 101.7 on 4/13 AM with worsening leukocytosis to 17K. meets sepsis criteria.   - additional fever w/u unrevealing.   - 4/12 OR bone cx with GPCs, staph simulans, and citrobacter freundii -  continue to follow  - ID following - on current doxy 100 BID,  cipro and flagyl  . d/w ID re: abx course/plan  - follow OR cultures  - seems volume status at goal

## 2023-04-17 NOTE — PROGRESS NOTE ADULT - SUBJECTIVE AND OBJECTIVE BOX
DATE OF SERVICE: 04-17-23 @ 13:19    Patient is a 72y old  Male who presents with a chief complaint of Transferred from [facility] for possible vascular intervention. (17 Apr 2023 12:44)      INTERVAL HISTORY: Feels ok.    REVIEW OF SYSTEMS:  CONSTITUTIONAL: No weakness  EYES/ENT: No visual changes;  No throat pain   NECK: No pain or stiffness  RESPIRATORY: No cough, wheezing; No shortness of breath  CARDIOVASCULAR: No chest pain or palpitations  GASTROINTESTINAL: No abdominal  pain. No nausea, vomiting, or hematemesis  GENITOURINARY: No dysuria, frequency or hematuria  NEUROLOGICAL: No stroke like symptoms  SKIN: No rashes    TELEMETRY Personally reviewed: SR/ST 90-110s  	  MEDICATIONS:  isosorbide   mononitrate ER Tablet (IMDUR) 30 milliGRAM(s) Oral daily  metoprolol succinate ER 50 milliGRAM(s) Oral daily  NIFEdipine XL 60 milliGRAM(s) Oral daily        PHYSICAL EXAM:  T(C): 37.2 (04-17-23 @ 11:25), Max: 37.2 (04-16-23 @ 21:56)  HR: 102 (04-17-23 @ 11:25) (87 - 114)  BP: 144/73 (04-17-23 @ 11:25) (144/73 - 157/77)  RR: 18 (04-17-23 @ 11:25) (18 - 18)  SpO2: 96% (04-17-23 @ 11:25) (96% - 99%)  Wt(kg): --  I&O's Summary    16 Apr 2023 07:01  -  17 Apr 2023 07:00  --------------------------------------------------------  IN: 240 mL / OUT: 480 mL / NET: -240 mL          Appearance: In no distress	  HEENT:    PERRL, EOMI	  Cardiovascular:  S1 S2, No JVD  Respiratory: Lungs clear to auscultation	  Gastrointestinal:  Soft, Non-tender, + BS	  Vascularature:  No edema of LE  Psychiatric: Appropriate affect   Neuro: no acute focal deficits                               7.4    11.93 )-----------( 297      ( 17 Apr 2023 06:28 )             21.0     04-17    140  |  107  |  55<H>  ----------------------------<  126<H>  4.1   |  21<L>  |  2.65<H>    Ca    8.8      17 Apr 2023 06:28          Labs personally reviewed      ASSESSMENT/PLAN: 	  73 y/o M--patient with a history of type 2 DM complicated with CKD4, PAD with unclear past stenting hx in the RLE, essential HTN tobacco use,  transferred from [facility] 3/30/23 with spouse reporting the patient had lost his R hallux toenail about a week prior to that admission and noted dark discoloration of the R hallux and 2nd toe with foul odor emanating from the R foot    #Pre-op risk stratification  - S/p peripheral angio 4/7/2023 revealing  of SFA bilaterally s/p DCBA and stenting of R SFA. Tolerated procedure well.   - Plan for stent to L SFA Monday 4/10 with vascular team.   - Pt displays no evidence of coronary ischemia  - TTE shows normal LV systolic function with no significant structural abnormalities.   - From a cardiac standpoint, Pt is optimized for vascular intervention  - Pt is intermediate to moderate risk for desired procedure  - Further recommendations to follow pending clinical course.   - s/p partial amputation of both 1st metatarsals 4/12-- tolerated surgery well    #PAD  - Continue Cilostazol, and DAPT therapy  - Statin therapy  - Vascular input appreciated    #CKD  - Renal input appreciated    #CAD  - S/p PCI   -Continue Plavix, statin and BB    # Sinus Tachycardia  - HR elevated 100-120  - Possibly pre-renal post operatively vs reactive to infection (ID following)   - If HR remains elevated, consider D-Dimer, B/L LE US r/o DVT, VQ scan  - 4/15 5 beats WCT. Asymptomatic. Likely i/s/i infection  A-paced. No intervention          SUHA Cordon-DELICIA Liu DO [facility]  Cardiovascular Medicine  [address], [address]  Available through call or text on [employer] TEAMs  Office: [phone]

## 2023-04-17 NOTE — PROGRESS NOTE ADULT - ASSESSMENT
71 y/o M--patient with a history of type 2 DM complicated with CKD4, PAD with past stenting RLE, essential HTN, apparently still active cigarette use until one week ago, UNVACCINATED against COVID-19, with initial admission to [facility] 3/30/23 in the setting of patient with mild cognitive impairment, with spouse reporting the patient had lost his RIGHT hallux toenail about a week prior to that admission and noted dark discoloration of the RIGHT hallux and 2nd toe with foul odor emanating from the RIGHT foot+ left foot gangrene    Miguel Galicia  Attending Physician   Division of Infectious Disease  Office #[phone]  Available on Microsoft Teams also  After 5pm/weekend or no response, call #[phone]

## 2023-04-17 NOTE — PROGRESS NOTE ADULT - PROBLEM SELECTOR PLAN 4
Pt with stay at OSH on iv abx seen by pod and ID there.   - wound cx grew Citrobacter, staph simulans & helcococcus yassine, and poryphyromonas  - now s/p bilateral partial first and second ray resection, closed, EBL 30cc, patient bled moderately on left but inadequately on right, high concern for viability, low concern for residual infection as bone quality at level of resection was hard and adequate with Podiatry on 4/12  - f/u clean bone blood cx obtained in OR 4/12    - IV aztreonam discontinued on 4/12. however with new fever on 4/13. abx plan as above

## 2023-04-17 NOTE — PROGRESS NOTE ADULT - SUBJECTIVE AND OBJECTIVE BOX
ADDIS ARAUZ 72y [id number]    Patient is a 72y old  Male who presents with a chief complaint of Transferred from [facility] for possible vascular intervention. (17 Apr 2023 15:33)      Follow Up/CC:  ID following for toe gangrene    Interval History/ROS: no fever     Allergies    penicillin (Unknown)    Intolerances        ANTIMICROBIALS:  ciprofloxacin   IVPB 200 every 12 hours  doxycycline monohydrate Capsule 100 every 12 hours  metroNIDAZOLE  IVPB 500 every 12 hours      MEDICATIONS  (STANDING):  aspirin enteric coated 81 milliGRAM(s) Oral daily  atorvastatin 40 milliGRAM(s) Oral at bedtime  chlorhexidine 2% Cloths 1 Application(s) Topical daily  cilostazol 100 milliGRAM(s) Oral two times a day  ciprofloxacin   IVPB 200 milliGRAM(s) IV Intermittent every 12 hours  clopidogrel Tablet 75 milliGRAM(s) Oral daily  dextrose 5%. 1000 milliLiter(s) (50 mL/Hr) IV Continuous <Continuous>  dextrose 5%. 1000 milliLiter(s) (100 mL/Hr) IV Continuous <Continuous>  dextrose 50% Injectable 12.5 Gram(s) IV Push once  dextrose 50% Injectable 25 Gram(s) IV Push once  doxycycline monohydrate Capsule 100 milliGRAM(s) Oral every 12 hours  glucagon  Injectable 1 milliGRAM(s) IntraMuscular once  insulin glargine Injectable (LANTUS) 22 Unit(s) SubCutaneous at bedtime  insulin lispro (ADMELOG) corrective regimen sliding scale   SubCutaneous three times a day before meals  insulin lispro (ADMELOG) corrective regimen sliding scale   SubCutaneous at bedtime  insulin lispro Injectable (ADMELOG) 10 Unit(s) SubCutaneous three times a day before meals  isosorbide   mononitrate ER Tablet (IMDUR) 30 milliGRAM(s) Oral daily  metoprolol succinate ER 50 milliGRAM(s) Oral daily  metroNIDAZOLE  IVPB 500 milliGRAM(s) IV Intermittent every 12 hours  Nephro-dorita 1 Tablet(s) Oral daily  NIFEdipine XL 60 milliGRAM(s) Oral daily  pantoprazole    Tablet 40 milliGRAM(s) Oral before breakfast  senna 2 Tablet(s) Oral at bedtime  sodium chloride 0.9%. 1000 milliLiter(s) (75 mL/Hr) IV Continuous <Continuous>    MEDICATIONS  (PRN):  acetaminophen  300 mG/codeine 30 mG 1 Tablet(s) Oral every 4 hours PRN Mild Pain (1 - 3)  dextrose Oral Gel 15 Gram(s) Oral once PRN Blood Glucose LESS THAN 70 milliGRAM(s)/deciliter  oxycodone    5 mG/acetaminophen 325 mG 1 Tablet(s) Oral every 6 hours PRN Severe Pain (7 - 10)        Vital Signs Last 24 Hrs  T(C): 37.2 (17 Apr 2023 11:25), Max: 37.2 (16 Apr 2023 21:56)  T(F): 99 (17 Apr 2023 11:25), Max: 99 (17 Apr 2023 11:25)  HR: 102 (17 Apr 2023 11:25) (87 - 114)  BP: 144/73 (17 Apr 2023 11:25) (144/73 - 157/77)  BP(mean): --  RR: 18 (17 Apr 2023 11:25) (18 - 18)  SpO2: 96% (17 Apr 2023 11:25) (96% - 99%)    Parameters below as of 17 Apr 2023 11:25  Patient On (Oxygen Delivery Method): room air        CBC Full  -  ( 17 Apr 2023 06:28 )  WBC Count : 11.93 K/uL  RBC Count : 2.75 M/uL  Hemoglobin : 7.4 g/dL  Hematocrit : 21.0 %  Platelet Count - Automated : 297 K/uL  Mean Cell Volume : 76.4 fl  Mean Cell Hemoglobin : 26.9 pg  Mean Cell Hemoglobin Concentration : 35.2 gm/dL  Auto Neutrophil # : x  Auto Lymphocyte # : x  Auto Monocyte # : x  Auto Eosinophil # : x  Auto Basophil # : x  Auto Neutrophil % : x  Auto Lymphocyte % : x  Auto Monocyte % : x  Auto Eosinophil % : x  Auto Basophil % : x    04-17    140  |  107  |  55<H>  ----------------------------<  126<H>  4.1   |  21<L>  |  2.65<H>    Ca    8.8      17 Apr 2023 06:28            MICROBIOLOGY:  .Blood Blood-Peripheral  04-13-23   No growth to date.  --  --      .Tissue Other  04-12-23   Rare Staphylococcus simulans  --  Staphylococcus simulans      .Surgical Swab right foot deep wound  04-12-23   Rare Citrobacter freundii complex  Rare Coag Negative Staphylococcus  --  Citrobacter freundii complex  Staphylococcus simulans      .Surgical Swab left foot deep wound  04-12-23   No growth at 5 days  --  --      .Tissue Other  04-12-23   No growth  --    No polymorphonuclear cells seen per low power field  Rare Gram positive cocci in pairs per oil power field      .Abscess Left foot  04-04-23   Few Citrobacter freundii complex  Rare Staphylococcus simulans "Susceptibilities not performed"  Few Helcococcus kunzii "Susceptibilities not performed"  Rare Porphyromonas somerae "Susceptibilities not performed"  --  Citrobacter freundii complex              v            RADIOLOGY

## 2023-04-17 NOTE — PROGRESS NOTE ADULT - ASSESSMENT
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  - Febrile over night, WBC 11.93  - 4/12 s/p B/L P12RR: Right foot: skin edges well coapted, warm, no early ischemic changes, sutures intact, no signs of infection. Left foot: well coapted, viable, no early ischemic changes, no signs of infection  - Pe intra-op findings: high concern for viability, low concern for residual bone infection  - OR clean bone culture growing staph simulans, C freudii, CNS  - ID recs appreciated  - Pt is s/p b/l LE angiograms: RLE angiogram w/ SFA stent, LLE angio w/ 2-vessel r/o to foot  - Right foot 1st met clean bone margin staph stimulans, OR wound culture citrobacter freundii  - Left foot 1st met clean bone margin and OR wound culture no growth prelim   - Stable from podiatry for discharge pending final ID recs  - Discussed with attending

## 2023-04-18 VITALS
SYSTOLIC BLOOD PRESSURE: 132 MMHG | HEART RATE: 109 BPM | RESPIRATION RATE: 18 BRPM | DIASTOLIC BLOOD PRESSURE: 77 MMHG | TEMPERATURE: 98 F | OXYGEN SATURATION: 97 %

## 2023-04-18 LAB
ANION GAP SERPL CALC-SCNC: 11 MMOL/L — SIGNIFICANT CHANGE UP (ref 5–17)
BUN SERPL-MCNC: 44 MG/DL — HIGH (ref 7–23)
CALCIUM SERPL-MCNC: 9 MG/DL — SIGNIFICANT CHANGE UP (ref 8.4–10.5)
CHLORIDE SERPL-SCNC: 109 MMOL/L — HIGH (ref 96–108)
CO2 SERPL-SCNC: 21 MMOL/L — LOW (ref 22–31)
CREAT SERPL-MCNC: 2.23 MG/DL — HIGH (ref 0.5–1.3)
CULTURE RESULTS: SIGNIFICANT CHANGE UP
CULTURE RESULTS: SIGNIFICANT CHANGE UP
EGFR: 31 ML/MIN/1.73M2 — LOW
GLUCOSE BLDC GLUCOMTR-MCNC: 127 MG/DL — HIGH (ref 70–99)
GLUCOSE BLDC GLUCOMTR-MCNC: 152 MG/DL — HIGH (ref 70–99)
GLUCOSE BLDC GLUCOMTR-MCNC: 296 MG/DL — HIGH (ref 70–99)
GLUCOSE SERPL-MCNC: 78 MG/DL — SIGNIFICANT CHANGE UP (ref 70–99)
HCT VFR BLD CALC: 22.6 % — LOW (ref 39–50)
HGB BLD-MCNC: 7.8 G/DL — LOW (ref 13–17)
MCHC RBC-ENTMCNC: 26.3 PG — LOW (ref 27–34)
MCHC RBC-ENTMCNC: 34.5 GM/DL — SIGNIFICANT CHANGE UP (ref 32–36)
MCV RBC AUTO: 76.1 FL — LOW (ref 80–100)
NRBC # BLD: 0 /100 WBCS — SIGNIFICANT CHANGE UP (ref 0–0)
PLATELET # BLD AUTO: 321 K/UL — SIGNIFICANT CHANGE UP (ref 150–400)
POTASSIUM SERPL-MCNC: 4.2 MMOL/L — SIGNIFICANT CHANGE UP (ref 3.5–5.3)
POTASSIUM SERPL-SCNC: 4.2 MMOL/L — SIGNIFICANT CHANGE UP (ref 3.5–5.3)
RBC # BLD: 2.97 M/UL — LOW (ref 4.2–5.8)
RBC # FLD: 17.2 % — HIGH (ref 10.3–14.5)
SODIUM SERPL-SCNC: 141 MMOL/L — SIGNIFICANT CHANGE UP (ref 135–145)
SPECIMEN SOURCE: SIGNIFICANT CHANGE UP
SPECIMEN SOURCE: SIGNIFICANT CHANGE UP
WBC # BLD: 10.84 K/UL — HIGH (ref 3.8–10.5)
WBC # FLD AUTO: 10.84 K/UL — HIGH (ref 3.8–10.5)

## 2023-04-18 PROCEDURE — 97110 THERAPEUTIC EXERCISES: CPT

## 2023-04-18 PROCEDURE — 86900 BLOOD TYPING SEROLOGIC ABO: CPT

## 2023-04-18 PROCEDURE — U0003: CPT

## 2023-04-18 PROCEDURE — 88311 DECALCIFY TISSUE: CPT

## 2023-04-18 PROCEDURE — 99239 HOSP IP/OBS DSCHRG MGMT >30: CPT

## 2023-04-18 PROCEDURE — 83735 ASSAY OF MAGNESIUM: CPT

## 2023-04-18 PROCEDURE — 85652 RBC SED RATE AUTOMATED: CPT

## 2023-04-18 PROCEDURE — 82962 GLUCOSE BLOOD TEST: CPT

## 2023-04-18 PROCEDURE — 87077 CULTURE AEROBIC IDENTIFY: CPT

## 2023-04-18 PROCEDURE — 86140 C-REACTIVE PROTEIN: CPT

## 2023-04-18 PROCEDURE — 80053 COMPREHEN METABOLIC PANEL: CPT

## 2023-04-18 PROCEDURE — U0005: CPT

## 2023-04-18 PROCEDURE — 97116 GAIT TRAINING THERAPY: CPT

## 2023-04-18 PROCEDURE — C1874: CPT

## 2023-04-18 PROCEDURE — 93356 MYOCRD STRAIN IMG SPCKL TRCK: CPT

## 2023-04-18 PROCEDURE — 99232 SBSQ HOSP IP/OBS MODERATE 35: CPT

## 2023-04-18 PROCEDURE — 83036 HEMOGLOBIN GLYCOSYLATED A1C: CPT

## 2023-04-18 PROCEDURE — 93005 ELECTROCARDIOGRAM TRACING: CPT

## 2023-04-18 PROCEDURE — 86850 RBC ANTIBODY SCREEN: CPT

## 2023-04-18 PROCEDURE — 86803 HEPATITIS C AB TEST: CPT

## 2023-04-18 PROCEDURE — 81003 URINALYSIS AUTO W/O SCOPE: CPT

## 2023-04-18 PROCEDURE — 87205 SMEAR GRAM STAIN: CPT

## 2023-04-18 PROCEDURE — 97530 THERAPEUTIC ACTIVITIES: CPT

## 2023-04-18 PROCEDURE — 73630 X-RAY EXAM OF FOOT: CPT

## 2023-04-18 PROCEDURE — C2623: CPT

## 2023-04-18 PROCEDURE — C1769: CPT

## 2023-04-18 PROCEDURE — 87641 MR-STAPH DNA AMP PROBE: CPT

## 2023-04-18 PROCEDURE — 84100 ASSAY OF PHOSPHORUS: CPT

## 2023-04-18 PROCEDURE — 85025 COMPLETE CBC W/AUTO DIFF WBC: CPT

## 2023-04-18 PROCEDURE — 83615 LACTATE (LD) (LDH) ENZYME: CPT

## 2023-04-18 PROCEDURE — 87040 BLOOD CULTURE FOR BACTERIA: CPT

## 2023-04-18 PROCEDURE — 37226: CPT

## 2023-04-18 PROCEDURE — 97162 PT EVAL MOD COMPLEX 30 MIN: CPT

## 2023-04-18 PROCEDURE — 85730 THROMBOPLASTIN TIME PARTIAL: CPT

## 2023-04-18 PROCEDURE — 84156 ASSAY OF PROTEIN URINE: CPT

## 2023-04-18 PROCEDURE — 81001 URINALYSIS AUTO W/SCOPE: CPT

## 2023-04-18 PROCEDURE — C1725: CPT

## 2023-04-18 PROCEDURE — 85045 AUTOMATED RETICULOCYTE COUNT: CPT

## 2023-04-18 PROCEDURE — 87640 STAPH A DNA AMP PROBE: CPT

## 2023-04-18 PROCEDURE — 85610 PROTHROMBIN TIME: CPT

## 2023-04-18 PROCEDURE — C1894: CPT

## 2023-04-18 PROCEDURE — 76770 US EXAM ABDO BACK WALL COMP: CPT

## 2023-04-18 PROCEDURE — 71045 X-RAY EXAM CHEST 1 VIEW: CPT

## 2023-04-18 PROCEDURE — C1760: CPT

## 2023-04-18 PROCEDURE — 83605 ASSAY OF LACTIC ACID: CPT

## 2023-04-18 PROCEDURE — 93306 TTE W/DOPPLER COMPLETE: CPT

## 2023-04-18 PROCEDURE — 83550 IRON BINDING TEST: CPT

## 2023-04-18 PROCEDURE — 83540 ASSAY OF IRON: CPT

## 2023-04-18 PROCEDURE — 93925 LOWER EXTREMITY STUDY: CPT

## 2023-04-18 PROCEDURE — 83010 ASSAY OF HAPTOGLOBIN QUANT: CPT

## 2023-04-18 PROCEDURE — 86901 BLOOD TYPING SEROLOGIC RH(D): CPT

## 2023-04-18 PROCEDURE — 93923 UPR/LXTR ART STDY 3+ LVLS: CPT

## 2023-04-18 PROCEDURE — 82570 ASSAY OF URINE CREATININE: CPT

## 2023-04-18 PROCEDURE — 80048 BASIC METABOLIC PNL TOTAL CA: CPT

## 2023-04-18 PROCEDURE — C1887: CPT

## 2023-04-18 PROCEDURE — 87186 SC STD MICRODIL/AGAR DIL: CPT

## 2023-04-18 PROCEDURE — 87075 CULTR BACTERIA EXCEPT BLOOD: CPT

## 2023-04-18 PROCEDURE — 88305 TISSUE EXAM BY PATHOLOGIST: CPT

## 2023-04-18 PROCEDURE — 85027 COMPLETE CBC AUTOMATED: CPT

## 2023-04-18 PROCEDURE — 87070 CULTURE OTHR SPECIMN AEROBIC: CPT

## 2023-04-18 PROCEDURE — 36415 COLL VENOUS BLD VENIPUNCTURE: CPT

## 2023-04-18 PROCEDURE — 82728 ASSAY OF FERRITIN: CPT

## 2023-04-18 PROCEDURE — 80202 ASSAY OF VANCOMYCIN: CPT

## 2023-04-18 RX ORDER — HYDROCHLOROTHIAZIDE 25 MG
12.5 TABLET ORAL
Qty: 0 | Refills: 0 | DISCHARGE
Start: 2023-04-18

## 2023-04-18 RX ORDER — HYDROCHLOROTHIAZIDE 25 MG
1 TABLET ORAL
Refills: 0 | DISCHARGE

## 2023-04-18 RX ADMIN — PANTOPRAZOLE SODIUM 40 MILLIGRAM(S): 20 TABLET, DELAYED RELEASE ORAL at 05:27

## 2023-04-18 RX ADMIN — Medication 10 UNIT(S): at 12:37

## 2023-04-18 RX ADMIN — Medication 1 TABLET(S): at 12:36

## 2023-04-18 RX ADMIN — ISOSORBIDE MONONITRATE 30 MILLIGRAM(S): 60 TABLET, EXTENDED RELEASE ORAL at 12:36

## 2023-04-18 RX ADMIN — CILOSTAZOL 100 MILLIGRAM(S): 100 TABLET ORAL at 17:05

## 2023-04-18 RX ADMIN — Medication 10 UNIT(S): at 16:36

## 2023-04-18 RX ADMIN — CLOPIDOGREL BISULFATE 75 MILLIGRAM(S): 75 TABLET, FILM COATED ORAL at 12:36

## 2023-04-18 RX ADMIN — Medication 10 UNIT(S): at 08:07

## 2023-04-18 RX ADMIN — Medication 6: at 12:37

## 2023-04-18 RX ADMIN — Medication 2: at 16:36

## 2023-04-18 RX ADMIN — Medication 81 MILLIGRAM(S): at 12:37

## 2023-04-18 NOTE — PROGRESS NOTE ADULT - PROBLEM SELECTOR PLAN 1
-appears dry  -s/p toe amputation by podiatry  -bactrim SS 1 tab po daily   -total 6 weeks abx from toe amputation to cover OM  -potential side effects of abx explained including GI, Cdiff, allergy issues, development of resistance, renal issues, etc.

## 2023-04-18 NOTE — PROGRESS NOTE ADULT - PROBLEM SELECTOR PROBLEM 1
Gangrene
Peripheral artery disease
Chronic osteomyelitis
Gangrene
Gangrene
Chronic osteomyelitis
Elevated serum creatinine
Fever
Gangrene
Elevated serum creatinine
Fever
Peripheral artery disease
Peripheral artery disease
Chronic osteomyelitis
Fever
Peripheral artery disease
Peripheral artery disease
Fever
Gangrene
Gangrene

## 2023-04-18 NOTE — PROGRESS NOTE ADULT - PROBLEM SELECTOR PLAN 2
VA arterial duplex showing mod severe disease.   - s/p peripheral angio 4/7/2023 revealing  of SFA bilaterally s/p DCBA and stenting of R SFA  - patient with known PAD with CLI Thermal Classification 6 with bilateral  of the SFAs,   - now s/p stent to L SFA on 4/10  - c/w DAPT, cilostazol    outpt f/u with vasc surg

## 2023-04-18 NOTE — PROGRESS NOTE ADULT - SUBJECTIVE AND OBJECTIVE BOX
[facility] Division of Hospital Medicine  Mary Saravia MD  Pager (M-F, 8V-7V): [phone]  Other Times:  [phone]    Patient is a 72y old  Male who presents with a chief complaint of Transferred from [facility] for possible vascular intervention. (18 Apr 2023 11:06)      SUBJECTIVE / OVERNIGHT EVENTS:  feeling well. denies any complaints. asking to go home. afebrile.   seen with wife at bedside.     ADDITIONAL REVIEW OF SYSTEMS: otherwise neg    MEDICATIONS  (STANDING):  aspirin enteric coated 81 milliGRAM(s) Oral daily  atorvastatin 40 milliGRAM(s) Oral at bedtime  chlorhexidine 2% Cloths 1 Application(s) Topical daily  cilostazol 100 milliGRAM(s) Oral two times a day  clopidogrel Tablet 75 milliGRAM(s) Oral daily  dextrose 5%. 1000 milliLiter(s) (50 mL/Hr) IV Continuous <Continuous>  dextrose 5%. 1000 milliLiter(s) (100 mL/Hr) IV Continuous <Continuous>  dextrose 50% Injectable 12.5 Gram(s) IV Push once  dextrose 50% Injectable 25 Gram(s) IV Push once  glucagon  Injectable 1 milliGRAM(s) IntraMuscular once  insulin glargine Injectable (LANTUS) 22 Unit(s) SubCutaneous at bedtime  insulin lispro (ADMELOG) corrective regimen sliding scale   SubCutaneous three times a day before meals  insulin lispro (ADMELOG) corrective regimen sliding scale   SubCutaneous at bedtime  insulin lispro Injectable (ADMELOG) 10 Unit(s) SubCutaneous three times a day before meals  isosorbide   mononitrate ER Tablet (IMDUR) 30 milliGRAM(s) Oral daily  metoprolol succinate ER 50 milliGRAM(s) Oral daily  Nephro-dorita 1 Tablet(s) Oral daily  NIFEdipine XL 60 milliGRAM(s) Oral daily  pantoprazole    Tablet 40 milliGRAM(s) Oral before breakfast  senna 2 Tablet(s) Oral at bedtime  sodium chloride 0.9%. 1000 milliLiter(s) (75 mL/Hr) IV Continuous <Continuous>  trimethoprim   80 mG/sulfamethoxazole 400 mG 1 Tablet(s) Oral daily    MEDICATIONS  (PRN):  acetaminophen  300 mG/codeine 30 mG 1 Tablet(s) Oral every 4 hours PRN Mild Pain (1 - 3)  dextrose Oral Gel 15 Gram(s) Oral once PRN Blood Glucose LESS THAN 70 milliGRAM(s)/deciliter  oxycodone    5 mG/acetaminophen 325 mG 1 Tablet(s) Oral every 6 hours PRN Severe Pain (7 - 10)      CAPILLARY BLOOD GLUCOSE      POCT Blood Glucose.: 296 mg/dL (18 Apr 2023 12:34)  POCT Blood Glucose.: 127 mg/dL (18 Apr 2023 08:01)  POCT Blood Glucose.: 107 mg/dL (17 Apr 2023 21:37)  POCT Blood Glucose.: 308 mg/dL (17 Apr 2023 16:29)    I&O's Summary    17 Apr 2023 07:01  -  18 Apr 2023 07:00  --------------------------------------------------------  IN: 360 mL / OUT: 600 mL / NET: -240 mL        PHYSICAL EXAM:  Vital Signs Last 24 Hrs  T(C): 36.9 (18 Apr 2023 11:42), Max: 37.3 (18 Apr 2023 05:24)  T(F): 98.5 (18 Apr 2023 11:42), Max: 99.1 (18 Apr 2023 05:24)  HR: 109 (18 Apr 2023 11:42) (98 - 109)  BP: 132/77 (18 Apr 2023 11:42) (132/77 - 160/86)  BP(mean): --  RR: 18 (18 Apr 2023 11:42) (16 - 18)  SpO2: 97% (18 Apr 2023 11:42) (97% - 99%)    Parameters below as of 18 Apr 2023 11:42  Patient On (Oxygen Delivery Method): room air      CONSTITUTIONAL: NAD   EYES:  conjunctiva and sclera clear  ENMT: Moist oral mucosa  NECK: Supple, no palpable masses; no JVD  RESPIRATORY: Normal respiratory effort; lungs are clear to auscultation bilaterally  CARDIOVASCULAR: Regular rate and rhythm,systolic murmur ; No lower extremity edema  ABDOMEN: Nontender to palpation, normoactive bowel sounds, no rebound/guarding  MUSCULOSKELETAL:  no clubbing or cyanosis of digits; no joint swelling or tenderness to palpation  PSYCH: A+O to person, place ; affect appropriate  SKIN: bilateral foot dressing in place. skin warm to touch., no cyanosis     LABS:                        7.8    10.84 )-----------( 321      ( 18 Apr 2023 06:39 )             22.6     04-18    141  |  109<H>  |  44<H>  ----------------------------<  78  4.2   |  21<L>  |  2.23<H>    Ca    9.0      18 Apr 2023 06:39                  RADIOLOGY & ADDITIONAL TESTS:  Results Reviewed:   Imaging Personally Reviewed:  Electrocardiogram Personally Reviewed:    COORDINATION OF CARE:  Care Discussed with Consultants/Other Providers [Y/N]:  Prior or Outpatient Records Reviewed [Y/N]:

## 2023-04-18 NOTE — PROGRESS NOTE ADULT - ASSESSMENT
73 y/o M--patient with a history of type 2 DM complicated with CKD4, PAD with past stenting RLE, essential HTN, apparently still active cigarette use until one week ago, UNVACCINATED against COVID-19, with initial admission to [facility] 3/30/23 in the setting of patient with mild cognitive impairment, with spouse reporting the patient had lost his RIGHT hallux toenail about a week prior to that admission and noted dark discoloration of the RIGHT hallux and 2nd toe with foul odor emanating from the RIGHT foot+ left foot gangrene    Miguel Galicia  Attending Physician   Division of Infectious Disease  Office #[phone]  Available on [employer] Teams also  After 5pm/weekend or no response, call #[phone]

## 2023-04-18 NOTE — PROGRESS NOTE ADULT - REASON FOR ADMISSION
[facility]

## 2023-04-18 NOTE — PROGRESS NOTE ADULT - NEGATIVE ENMT SYMPTOMS
no ear pain/no nasal congestion/no throat pain

## 2023-04-18 NOTE — PROGRESS NOTE ADULT - NSPROGADDITIONALINFOA_GEN_ALL_CORE
Will sign off, recall ID if needed #[phone].
.  Samara Sung MD  Division of Hospital Medicine  [facility]   Available on [employer] Teams - messages preferred prior to calls.    Plan discussed with patient, ID attending Dr. No, and medicine NP Velia.
d/w ACP  d.w ID Dr. Galicia    d/c home today  d/c time 40 mins
d/w ACP
.  Samara Sung MD  Division of Hospital Medicine  [facility]   Available on [employer] Teams - messages preferred prior to calls.    Plan discussed with patient, ID attending Dr. No, and medicine VINAYAK Carter.  Again offered to update wife given new fever, but again declines.
Will sign off, recall ID if needed #[id number].
d/w ACP  d.w ID Dr. Galicia
D/w Dr. Sung    Please call the ID service [phone] with questions or concerns over the weekend.
d/w ACP
.  Samara Sung MD  Division of Hospital Medicine  [facility]   Available on [employer] Teams - messages preferred prior to calls.    OR tomorrow with podiatry for resection/amputation.  Medically optimized for OR.    Plan discussed with patient, Cardiology Attending Dr. Liu, and medicine NP Velia.
.  Samara Sung MD  Division of Hospital Medicine  [facility]   Available on [employer] Teams - messages preferred prior to calls.    Plan discussed with patient and medicine NP Rossana
.  Samara Sung MD  Division of Hospital Medicine  [facility]   Available on [employer] Teams - messages preferred prior to calls.    Plan discussed with patient, ID attending Dr. Galicia, and medicine VINAYAK Carter.
D/w Dr. Saravia

## 2023-04-18 NOTE — PROGRESS NOTE ADULT - SUBJECTIVE AND OBJECTIVE BOX
ADDIS ARAUZ 72y [id number]    Patient is a 72y old  Male who presents with a chief complaint of Transferred from [facility] for possible vascular intervention. (17 Apr 2023 15:40)      Follow Up/CC:  ID following for toe gangrene     Interval History/ROS: no fever     Allergies    penicillin (Unknown)    Intolerances        ANTIMICROBIALS:  trimethoprim   80 mG/sulfamethoxazole 400 mG 1 daily      MEDICATIONS  (STANDING):  aspirin enteric coated 81 milliGRAM(s) Oral daily  atorvastatin 40 milliGRAM(s) Oral at bedtime  chlorhexidine 2% Cloths 1 Application(s) Topical daily  cilostazol 100 milliGRAM(s) Oral two times a day  clopidogrel Tablet 75 milliGRAM(s) Oral daily  dextrose 5%. 1000 milliLiter(s) (100 mL/Hr) IV Continuous <Continuous>  dextrose 5%. 1000 milliLiter(s) (50 mL/Hr) IV Continuous <Continuous>  dextrose 50% Injectable 12.5 Gram(s) IV Push once  dextrose 50% Injectable 25 Gram(s) IV Push once  glucagon  Injectable 1 milliGRAM(s) IntraMuscular once  insulin glargine Injectable (LANTUS) 22 Unit(s) SubCutaneous at bedtime  insulin lispro (ADMELOG) corrective regimen sliding scale   SubCutaneous three times a day before meals  insulin lispro (ADMELOG) corrective regimen sliding scale   SubCutaneous at bedtime  insulin lispro Injectable (ADMELOG) 10 Unit(s) SubCutaneous three times a day before meals  isosorbide   mononitrate ER Tablet (IMDUR) 30 milliGRAM(s) Oral daily  metoprolol succinate ER 50 milliGRAM(s) Oral daily  Nephro-dorita 1 Tablet(s) Oral daily  NIFEdipine XL 60 milliGRAM(s) Oral daily  pantoprazole    Tablet 40 milliGRAM(s) Oral before breakfast  senna 2 Tablet(s) Oral at bedtime  sodium chloride 0.9%. 1000 milliLiter(s) (75 mL/Hr) IV Continuous <Continuous>  trimethoprim   80 mG/sulfamethoxazole 400 mG 1 Tablet(s) Oral daily    MEDICATIONS  (PRN):  acetaminophen  300 mG/codeine 30 mG 1 Tablet(s) Oral every 4 hours PRN Mild Pain (1 - 3)  dextrose Oral Gel 15 Gram(s) Oral once PRN Blood Glucose LESS THAN 70 milliGRAM(s)/deciliter  oxycodone    5 mG/acetaminophen 325 mG 1 Tablet(s) Oral every 6 hours PRN Severe Pain (7 - 10)        Vital Signs Last 24 Hrs  T(C): 37.3 (18 Apr 2023 05:24), Max: 37.3 (18 Apr 2023 05:24)  T(F): 99.1 (18 Apr 2023 05:24), Max: 99.1 (18 Apr 2023 05:24)  HR: 102 (18 Apr 2023 05:24) (98 - 102)  BP: 144/74 (18 Apr 2023 05:24) (144/73 - 160/86)  BP(mean): --  RR: 16 (18 Apr 2023 05:24) (16 - 18)  SpO2: 99% (18 Apr 2023 05:24) (96% - 99%)    Parameters below as of 18 Apr 2023 05:24  Patient On (Oxygen Delivery Method): room air        CBC Full  -  ( 18 Apr 2023 06:39 )  WBC Count : 10.84 K/uL  RBC Count : 2.97 M/uL  Hemoglobin : 7.8 g/dL  Hematocrit : 22.6 %  Platelet Count - Automated : 321 K/uL  Mean Cell Volume : 76.1 fl  Mean Cell Hemoglobin : 26.3 pg  Mean Cell Hemoglobin Concentration : 34.5 gm/dL  Auto Neutrophil # : x  Auto Lymphocyte # : x  Auto Monocyte # : x  Auto Eosinophil # : x  Auto Basophil # : x  Auto Neutrophil % : x  Auto Lymphocyte % : x  Auto Monocyte % : x  Auto Eosinophil % : x  Auto Basophil % : x    04-18    141  |  109<H>  |  44<H>  ----------------------------<  78  4.2   |  21<L>  |  2.23<H>    Ca    9.0      18 Apr 2023 06:39            MICROBIOLOGY:  .Blood Blood-Peripheral  04-13-23   No growth to date.  --  --      .Tissue Other  04-12-23   Rare Staphylococcus simulans  --  Staphylococcus simulans      .Surgical Swab right foot deep wound  04-12-23   Rare Citrobacter freundii complex  Rare Coag Negative Staphylococcus  --  Citrobacter freundii complex  Staphylococcus simulans      .Surgical Swab left foot deep wound  04-12-23   No growth at 5 days  --  --      .Tissue Other  04-12-23   No growth  --    No polymorphonuclear cells seen per low power field  Rare Gram positive cocci in pairs per oil power field      .Abscess Left foot  04-04-23   Few Citrobacter freundii complex  Rare Staphylococcus simulans "Susceptibilities not performed"  Few Helcococcus kunzii "Susceptibilities not performed"  Rare Porphyromonas somerae "Susceptibilities not performed"  --  Citrobacter freundii complex        RADIOLOGY    < from: Xray Chest 1 View- PORTABLE-Urgent (Xray Chest 1 View- PORTABLE-Urgent .) (04.13.23 @ 09:40) >  Clear lungs.    < end of copied text >

## 2023-04-18 NOTE — PROGRESS NOTE ADULT - RESPIRATORY
clear to auscultation bilaterally/no wheezes/no respiratory distress

## 2023-04-18 NOTE — PROGRESS NOTE ADULT - PROBLEM SELECTOR PLAN 4
Unclear baseline.   - Cr overall stable . suspect at baseline   - renal ultrasound with no hydro  - Nephrology consulted, recs appreciated  - renally dose meds to GFR, avoid nephrotoxic agents  - monitor labs

## 2023-04-18 NOTE — PROGRESS NOTE ADULT - PROBLEM SELECTOR PLAN 7
BP elevated  - c/w Toprol XL 50mg daily  - c/w imdur ER 30mg daily  started on nifedipine on this admission but can likely transition back to home lisinopril and hctz on d/c  Will need outpt f/u with PMD/renal for bloodwork.   - monitor vitals

## 2023-04-18 NOTE — PROGRESS NOTE ADULT - NS ATTEND AMEND GEN_ALL_CORE FT
Patient care and plan discussed and reviewed with Advanced Care Provider. Plan as outlined above edited by me to reflect our discussion.

## 2023-04-18 NOTE — PROGRESS NOTE ADULT - PROBLEM SELECTOR PLAN 5
HbA1c 5.5%  - c/w sliding scale  - c/w lantus 22 units qHS  - c/w pre-meal admelog to 10 units TID qAC  - monitor FS qAC + qHS    can resume back on home meds on d.c

## 2023-04-18 NOTE — PROGRESS NOTE ADULT - NUTRITIONAL ASSESSMENT
This patient has been assessed with a concern for Malnutrition and has been determined to have a diagnosis/diagnoses of Moderate protein-calorie malnutrition.    This patient is being managed with:   Diet Regular-  Consistent Carbohydrate {No Snacks} (CSTCHO)  DASH/TLC {Sodium & Cholesterol Restricted} (DASH)  Supplement Feeding Modality:  Oral  Nepro Cans or Servings Per Day:  2       Frequency:  Daily  Entered: Apr 12 2023  9:34AM  
This patient has been assessed with a concern for Malnutrition and has been determined to have a diagnosis/diagnoses of Moderate protein-calorie malnutrition.    This patient is being managed with:   Diet Regular-  Consistent Carbohydrate {No Snacks} (CSTCHO)  DASH/TLC {Sodium & Cholesterol Restricted} (DASH)  Supplement Feeding Modality:  Oral  Nepro Cans or Servings Per Day:  2       Frequency:  Daily  Entered: Apr 12 2023  9:34AM  
This patient has been assessed with a concern for Malnutrition and has been determined to have a diagnosis/diagnoses of Moderate protein-calorie malnutrition.    This patient is being managed with:   Diet Regular-  Consistent Carbohydrate {No Snacks} (CSTCHO)  DASH/TLC {Sodium & Cholesterol Restricted} (DASH)  No Concentrated Potassium  No Concentrated Phosphorus  Supplement Feeding Modality:  Oral  Nepro Cans or Servings Per Day:  2       Frequency:  Daily  Entered: Apr 5 2023  4:52PM  
This patient has been assessed with a concern for Malnutrition and has been determined to have a diagnosis/diagnoses of Moderate protein-calorie malnutrition.    This patient is being managed with:   Diet Regular-  Consistent Carbohydrate {No Snacks} (CSTCHO)  DASH/TLC {Sodium & Cholesterol Restricted} (DASH)  No Concentrated Potassium  No Concentrated Phosphorus  Supplement Feeding Modality:  Oral  Nepro Cans or Servings Per Day:  2       Frequency:  Daily  Entered: Apr 7 2023  5:08AM  
This patient has been assessed with a concern for Malnutrition and has been determined to have a diagnosis/diagnoses of Moderate protein-calorie malnutrition.    This patient is being managed with:   Diet Regular-  Consistent Carbohydrate {No Snacks} (CSTCHO)  DASH/TLC {Sodium & Cholesterol Restricted} (DASH)  No Concentrated Potassium  No Concentrated Phosphorus  Supplement Feeding Modality:  Oral  Nepro Cans or Servings Per Day:  2       Frequency:  Daily  Entered: Apr 7 2023  5:08AM  
This patient has been assessed with a concern for Malnutrition and has been determined to have a diagnosis/diagnoses of Moderate protein-calorie malnutrition.    This patient is being managed with:   Diet Regular-  Consistent Carbohydrate {No Snacks} (CSTCHO)  DASH/TLC {Sodium & Cholesterol Restricted} (DASH)  Supplement Feeding Modality:  Oral  Nepro Cans or Servings Per Day:  2       Frequency:  Daily  Entered: Apr 12 2023  9:34AM  
This patient has been assessed with a concern for Malnutrition and has been determined to have a diagnosis/diagnoses of Moderate protein-calorie malnutrition.    This patient is being managed with:   Diet Regular-  Consistent Carbohydrate {No Snacks} (CSTCHO)  DASH/TLC {Sodium & Cholesterol Restricted} (DASH)  No Concentrated Potassium  No Concentrated Phosphorus  Supplement Feeding Modality:  Oral  Nepro Cans or Servings Per Day:  2       Frequency:  Daily  Entered: Apr 7 2023  5:08AM  
This patient has been assessed with a concern for Malnutrition and has been determined to have a diagnosis/diagnoses of Moderate protein-calorie malnutrition.    This patient is being managed with:   Diet NPO-  Except Medications  Entered: Apr 7 2023  5:03AM  
This patient has been assessed with a concern for Malnutrition and has been determined to have a diagnosis/diagnoses of Moderate protein-calorie malnutrition.    This patient is being managed with:   Diet Regular-  Consistent Carbohydrate {No Snacks} (CSTCHO)  DASH/TLC {Sodium & Cholesterol Restricted} (DASH)  Supplement Feeding Modality:  Oral  Nepro Cans or Servings Per Day:  2       Frequency:  Daily  Entered: Apr 12 2023  9:34AM  
This patient has been assessed with a concern for Malnutrition and has been determined to have a diagnosis/diagnoses of Moderate protein-calorie malnutrition.    This patient is being managed with:   Diet Regular-  Consistent Carbohydrate {No Snacks} (CSTCHO)  DASH/TLC {Sodium & Cholesterol Restricted} (DASH)  Supplement Feeding Modality:  Oral  Nepro Cans or Servings Per Day:  2       Frequency:  Daily  Entered: Apr 12 2023  9:34AM  
This patient has been assessed with a concern for Malnutrition and has been determined to have a diagnosis/diagnoses of Moderate protein-calorie malnutrition.    This patient is being managed with:   Diet NPO after Midnight-     NPO Start Date: 11-Apr-2023   NPO Start Time: 23:59  Entered: Apr 11 2023  9:25AM    Diet Regular-  Consistent Carbohydrate {No Snacks} (CSTCHO)  DASH/TLC {Sodium & Cholesterol Restricted} (DASH)  No Concentrated Potassium  No Concentrated Phosphorus  Supplement Feeding Modality:  Oral  Nepro Cans or Servings Per Day:  2       Frequency:  Daily  Entered: Apr 7 2023  5:08AM  
This patient has been assessed with a concern for Malnutrition and has been determined to have a diagnosis/diagnoses of Moderate protein-calorie malnutrition.    This patient is being managed with:   Diet NPO-  NPO for Procedure/Test     NPO Start Date: 10-Apr-2023   NPO Start Time: 10:00  Entered: Apr 10 2023 10:00AM    Diet Regular-  Consistent Carbohydrate {No Snacks} (CSTCHO)  DASH/TLC {Sodium & Cholesterol Restricted} (DASH)  No Concentrated Potassium  No Concentrated Phosphorus  Supplement Feeding Modality:  Oral  Nepro Cans or Servings Per Day:  2       Frequency:  Daily  Entered: Apr 7 2023  5:08AM  
This patient has been assessed with a concern for Malnutrition and has been determined to have a diagnosis/diagnoses of Moderate protein-calorie malnutrition.    This patient is being managed with:   Diet Regular-  Consistent Carbohydrate {No Snacks} (CSTCHO)  DASH/TLC {Sodium & Cholesterol Restricted} (DASH)  Supplement Feeding Modality:  Oral  Nepro Cans or Servings Per Day:  2       Frequency:  Daily  Entered: Apr 12 2023  9:34AM  
This patient has been assessed with a concern for Malnutrition and has been determined to have a diagnosis/diagnoses of Moderate protein-calorie malnutrition.    This patient is being managed with:   Diet Regular-  Consistent Carbohydrate {No Snacks} (CSTCHO)  DASH/TLC {Sodium & Cholesterol Restricted} (DASH)  Supplement Feeding Modality:  Oral  Nepro Cans or Servings Per Day:  2       Frequency:  Daily  Entered: Apr 12 2023  9:34AM

## 2023-04-18 NOTE — PROGRESS NOTE ADULT - PROBLEM SELECTOR PLAN 1
new fever to 101.7 on 4/13 AM with worsening leukocytosis to 17K. meets sepsis criteria.   - additional fever w/u unrevealing.   - 4/12 OR bone cx with GPCs, staph simulans, and citrobacter freundii -  continue to follow  - ID f/u appreciated. can switch to oral bactrim SS daily for 6 weeks total from pod intervention (5/23)   - seems volume status at goal

## 2023-04-18 NOTE — PROGRESS NOTE ADULT - SUBJECTIVE AND OBJECTIVE BOX
DATE OF SERVICE: 04-18-23 @ 11:07    Patient is a 72y old  Male who presents with a chief complaint of Transferred from [facility] for possible vascular intervention. (17 Apr 2023 15:40)      INTERVAL HISTORY: Feels ok.     REVIEW OF SYSTEMS:  CONSTITUTIONAL: No weakness  EYES/ENT: No visual changes;  No throat pain   NECK: No pain or stiffness  RESPIRATORY: No cough, wheezing; No shortness of breath  CARDIOVASCULAR: No chest pain or palpitations  GASTROINTESTINAL: No abdominal  pain. No nausea, vomiting, or hematemesis  GENITOURINARY: No dysuria, frequency or hematuria  NEUROLOGICAL: No stroke like symptoms  SKIN: No rashes    TELEMETRY Personally reviewed: SR   	  MEDICATIONS:  isosorbide   mononitrate ER Tablet (IMDUR) 30 milliGRAM(s) Oral daily  metoprolol succinate ER 50 milliGRAM(s) Oral daily  NIFEdipine XL 60 milliGRAM(s) Oral daily        PHYSICAL EXAM:  T(C): 37.3 (04-18-23 @ 05:24), Max: 37.3 (04-18-23 @ 05:24)  HR: 102 (04-18-23 @ 05:24) (98 - 102)  BP: 144/74 (04-18-23 @ 05:24) (144/73 - 160/86)  RR: 16 (04-18-23 @ 05:24) (16 - 18)  SpO2: 99% (04-18-23 @ 05:24) (96% - 99%)  Wt(kg): --  I&O's Summary    17 Apr 2023 07:01  -  18 Apr 2023 07:00  --------------------------------------------------------  IN: 360 mL / OUT: 600 mL / NET: -240 mL          Appearance: In no distress	  HEENT:    PERRL, EOMI	  Cardiovascular:  S1 S2, No JVD  Respiratory: Lungs clear to auscultation	  Gastrointestinal:  Soft, Non-tender, + BS	  Vascularature:  No edema of LE  Psychiatric: Appropriate affect   Neuro: no acute focal deficits                               7.8    10.84 )-----------( 321      ( 18 Apr 2023 06:39 )             22.6     04-18    141  |  109<H>  |  44<H>  ----------------------------<  78  4.2   |  21<L>  |  2.23<H>    Ca    9.0      18 Apr 2023 06:39          Labs personally reviewed      ASSESSMENT/PLAN: 	    73 y/o M--patient with a history of type 2 DM complicated with CKD4, PAD with unclear past stenting hx in the RLE, essential HTN tobacco use,  transferred from [facility] 3/30/23 with spouse reporting the patient had lost his R hallux toenail about a week prior to that admission and noted dark discoloration of the R hallux and 2nd toe with foul odor emanating from the R foot    #Pre-op risk stratification  - S/p peripheral angio 4/7/2023 revealing  of SFA bilaterally s/p DCBA and stenting of R SFA. Tolerated procedure well.   - Plan for stent to L SFA Monday 4/10 with vascular team.   - Pt displays no evidence of coronary ischemia  - TTE shows normal LV systolic function with no significant structural abnormalities.   - From a cardiac standpoint, Pt is optimized for vascular intervention  - Pt is intermediate to moderate risk for desired procedure  - Further recommendations to follow pending clinical course.   - s/p partial amputation of both 1st metatarsals 4/12-- tolerated surgery well    #PAD  - Continue Cilostazol, and DAPT therapy  - Statin therapy    #CKD  - Renal input appreciated    #CAD  - S/p PCI   -Continue Plavix, statin, Imdur and BB    # Sinus Tachycardia  - HR elevated 100-120  - Possibly pre-renal post operatively vs reactive to infection (ID following)   - If HR remains elevated, consider D-Dimer, B/L LE US r/o DVT, VQ scan  - 4/15 5 beats WCT. Asymptomatic. Likely i/s/i infection  A-paced. No intervention    # Hypertension  - BP mostly aboe target. Would increase Nifedipine to 90mg PO daily.  - c/w Toprol 50mg PO daily  - c/w Imdur 30mg PO daily        SUHA Cordon-DELICIA iLu DO [facility]  Cardiovascular Medicine  [address] [facility], [address]  Available through call or text on [employer]  Office: [phone]

## 2023-04-18 NOTE — PROGRESS NOTE ADULT - PROBLEM SELECTOR PLAN 8
DVT ppx: hep subc    Dispo: f/u ID re: abx plans   PT rec [facility] but pt/wife prefers home.   CM working on DME and home PT

## 2023-04-18 NOTE — PROGRESS NOTE ADULT - PROVIDER SPECIALTY LIST ADULT
Cardiology
Cardiology
Infectious Disease
Nephrology
Podiatry
Podiatry
Vascular Cardiology
Cardiology
Podiatry
Podiatry
Vascular Cardiology
Vascular Cardiology
Cardiology
Hospitalist
Nephrology
Podiatry
Vascular Cardiology
Cardiology
Infectious Disease
Infectious Disease
Internal Medicine
Hospitalist
Internal Medicine
Nephrology
Infectious Disease
Infectious Disease
Nephrology
Hospitalist
Internal Medicine
Hospitalist
Hospitalist
Internal Medicine
Hospitalist
Internal Medicine
Hospitalist
Infectious Disease
Infectious Disease

## 2023-04-19 PROBLEM — N18.4 CHRONIC KIDNEY DISEASE, STAGE 4 (SEVERE): Chronic | Status: ACTIVE | Noted: 2023-04-04

## 2023-04-19 PROBLEM — Z28.21 IMMUNIZATION NOT CARRIED OUT BECAUSE OF PATIENT REFUSAL: Chronic | Status: ACTIVE | Noted: 2023-04-04

## 2023-04-19 PROBLEM — Z00.00 ENCOUNTER FOR PREVENTIVE HEALTH EXAMINATION: Status: ACTIVE | Noted: 2023-04-19

## 2023-04-19 LAB
CULTURE RESULTS: SIGNIFICANT CHANGE UP
SPECIMEN SOURCE: SIGNIFICANT CHANGE UP

## 2023-05-04 ENCOUNTER — APPOINTMENT (OUTPATIENT)
Dept: VASCULAR SURGERY | Facility: CLINIC | Age: 73
End: 2023-05-04

## 2023-06-01 ENCOUNTER — APPOINTMENT (OUTPATIENT)
Dept: VASCULAR SURGERY | Facility: CLINIC | Age: 73
End: 2023-06-01
Payer: MEDICARE

## 2023-06-01 VITALS
SYSTOLIC BLOOD PRESSURE: 102 MMHG | DIASTOLIC BLOOD PRESSURE: 68 MMHG | HEART RATE: 84 BPM | HEIGHT: 67 IN | WEIGHT: 170 LBS | TEMPERATURE: 97.7 F | BODY MASS INDEX: 26.68 KG/M2

## 2023-06-01 DIAGNOSIS — I70.223 ATHEROSCLEROSIS OF NATIVE ARTERIES OF EXTREMITIES WITH REST PAIN, BILATERAL LEGS: ICD-10-CM

## 2023-06-01 PROCEDURE — 99213 OFFICE O/P EST LOW 20 MIN: CPT

## 2023-06-02 ENCOUNTER — TRANSCRIPTION ENCOUNTER (OUTPATIENT)
Age: 73
End: 2023-06-02

## 2023-06-02 ENCOUNTER — INPATIENT (INPATIENT)
Facility: HOSPITAL | Age: 73
LOS: 5 days | Discharge: HOME HEALTH | DRG: 515 | End: 2023-06-08
Attending: INTERNAL MEDICINE | Admitting: INTERNAL MEDICINE
Payer: MEDICARE

## 2023-06-02 VITALS
OXYGEN SATURATION: 100 % | DIASTOLIC BLOOD PRESSURE: 67 MMHG | RESPIRATION RATE: 18 BRPM | HEIGHT: 68 IN | SYSTOLIC BLOOD PRESSURE: 120 MMHG | WEIGHT: 147.05 LBS | TEMPERATURE: 98 F | HEART RATE: 77 BPM

## 2023-06-02 DIAGNOSIS — Z95.5 PRESENCE OF CORONARY ANGIOPLASTY IMPLANT AND GRAFT: Chronic | ICD-10-CM

## 2023-06-02 DIAGNOSIS — I73.9 PERIPHERAL VASCULAR DISEASE, UNSPECIFIED: Chronic | ICD-10-CM

## 2023-06-02 DIAGNOSIS — S91.309A UNSPECIFIED OPEN WOUND, UNSPECIFIED FOOT, INITIAL ENCOUNTER: ICD-10-CM

## 2023-06-02 PROBLEM — I70.223 CRITICAL LIMB ISCHEMIA OF BOTH LOWER EXTREMITIES: Status: ACTIVE | Noted: 2023-06-02

## 2023-06-02 LAB
ALBUMIN SERPL ELPH-MCNC: 4.2 G/DL — SIGNIFICANT CHANGE UP (ref 3.3–5)
ALP SERPL-CCNC: 93 U/L — SIGNIFICANT CHANGE UP (ref 40–120)
ALT FLD-CCNC: 10 U/L — SIGNIFICANT CHANGE UP (ref 10–45)
ANION GAP SERPL CALC-SCNC: 10 MMOL/L — SIGNIFICANT CHANGE UP (ref 5–17)
ANION GAP SERPL CALC-SCNC: 10 MMOL/L — SIGNIFICANT CHANGE UP (ref 5–17)
APTT BLD: 27.5 SEC — SIGNIFICANT CHANGE UP (ref 27.5–35.5)
AST SERPL-CCNC: 10 U/L — SIGNIFICANT CHANGE UP (ref 10–40)
BASE EXCESS BLDV CALC-SCNC: -7.4 MMOL/L — LOW (ref -2–3)
BASE EXCESS BLDV CALC-SCNC: -7.7 MMOL/L — LOW (ref -2–3)
BASOPHILS # BLD AUTO: 0.05 K/UL — SIGNIFICANT CHANGE UP (ref 0–0.2)
BASOPHILS NFR BLD AUTO: 0.7 % — SIGNIFICANT CHANGE UP (ref 0–2)
BILIRUB SERPL-MCNC: 0.3 MG/DL — SIGNIFICANT CHANGE UP (ref 0.2–1.2)
BUN SERPL-MCNC: 58 MG/DL — HIGH (ref 7–23)
BUN SERPL-MCNC: 66 MG/DL — HIGH (ref 7–23)
CA-I SERPL-SCNC: 1.26 MMOL/L — SIGNIFICANT CHANGE UP (ref 1.15–1.33)
CA-I SERPL-SCNC: 1.28 MMOL/L — SIGNIFICANT CHANGE UP (ref 1.15–1.33)
CALCIUM SERPL-MCNC: 8.8 MG/DL — SIGNIFICANT CHANGE UP (ref 8.4–10.5)
CALCIUM SERPL-MCNC: 8.9 MG/DL — SIGNIFICANT CHANGE UP (ref 8.4–10.5)
CHLORIDE BLDV-SCNC: 113 MMOL/L — HIGH (ref 96–108)
CHLORIDE BLDV-SCNC: 114 MMOL/L — HIGH (ref 96–108)
CHLORIDE SERPL-SCNC: 113 MMOL/L — HIGH (ref 96–108)
CHLORIDE SERPL-SCNC: 113 MMOL/L — HIGH (ref 96–108)
CO2 BLDV-SCNC: 20 MMOL/L — LOW (ref 22–26)
CO2 BLDV-SCNC: 22 MMOL/L — SIGNIFICANT CHANGE UP (ref 22–26)
CO2 SERPL-SCNC: 18 MMOL/L — LOW (ref 22–31)
CO2 SERPL-SCNC: 18 MMOL/L — LOW (ref 22–31)
CREAT SERPL-MCNC: 3.19 MG/DL — HIGH (ref 0.5–1.3)
CREAT SERPL-MCNC: 3.69 MG/DL — HIGH (ref 0.5–1.3)
EGFR: 17 ML/MIN/1.73M2 — LOW
EGFR: 20 ML/MIN/1.73M2 — LOW
EOSINOPHIL # BLD AUTO: 0.27 K/UL — SIGNIFICANT CHANGE UP (ref 0–0.5)
EOSINOPHIL NFR BLD AUTO: 3.9 % — SIGNIFICANT CHANGE UP (ref 0–6)
ERYTHROCYTE [SEDIMENTATION RATE] IN BLOOD: 6 MM/HR — SIGNIFICANT CHANGE UP (ref 0–20)
GAS PNL BLDV: 138 MMOL/L — SIGNIFICANT CHANGE UP (ref 136–145)
GAS PNL BLDV: 139 MMOL/L — SIGNIFICANT CHANGE UP (ref 136–145)
GAS PNL BLDV: SIGNIFICANT CHANGE UP
GAS PNL BLDV: SIGNIFICANT CHANGE UP
GLUCOSE BLDC GLUCOMTR-MCNC: 115 MG/DL — HIGH (ref 70–99)
GLUCOSE BLDC GLUCOMTR-MCNC: 87 MG/DL — SIGNIFICANT CHANGE UP (ref 70–99)
GLUCOSE BLDV-MCNC: 104 MG/DL — HIGH (ref 70–99)
GLUCOSE BLDV-MCNC: 92 MG/DL — SIGNIFICANT CHANGE UP (ref 70–99)
GLUCOSE SERPL-MCNC: 107 MG/DL — HIGH (ref 70–99)
GLUCOSE SERPL-MCNC: 91 MG/DL — SIGNIFICANT CHANGE UP (ref 70–99)
HCO3 BLDV-SCNC: 19 MMOL/L — LOW (ref 22–29)
HCO3 BLDV-SCNC: 20 MMOL/L — LOW (ref 22–29)
HCT VFR BLD CALC: 29.2 % — LOW (ref 39–50)
HCT VFR BLDA CALC: 30 % — LOW (ref 39–51)
HCT VFR BLDA CALC: 32 % — LOW (ref 39–51)
HGB BLD CALC-MCNC: 10 G/DL — LOW (ref 12.6–17.4)
HGB BLD CALC-MCNC: 10.7 G/DL — LOW (ref 12.6–17.4)
HGB BLD-MCNC: 9.9 G/DL — LOW (ref 13–17)
IMM GRANULOCYTES NFR BLD AUTO: 0.6 % — SIGNIFICANT CHANGE UP (ref 0–0.9)
INR BLD: 1.11 RATIO — SIGNIFICANT CHANGE UP (ref 0.88–1.16)
LACTATE BLDV-MCNC: 0.7 MMOL/L — SIGNIFICANT CHANGE UP (ref 0.5–2)
LACTATE BLDV-MCNC: 1 MMOL/L — SIGNIFICANT CHANGE UP (ref 0.5–2)
LYMPHOCYTES # BLD AUTO: 2.2 K/UL — SIGNIFICANT CHANGE UP (ref 1–3.3)
LYMPHOCYTES # BLD AUTO: 31.5 % — SIGNIFICANT CHANGE UP (ref 13–44)
MCHC RBC-ENTMCNC: 28.7 PG — SIGNIFICANT CHANGE UP (ref 27–34)
MCHC RBC-ENTMCNC: 33.9 GM/DL — SIGNIFICANT CHANGE UP (ref 32–36)
MCV RBC AUTO: 84.6 FL — SIGNIFICANT CHANGE UP (ref 80–100)
MONOCYTES # BLD AUTO: 0.74 K/UL — SIGNIFICANT CHANGE UP (ref 0–0.9)
MONOCYTES NFR BLD AUTO: 10.6 % — SIGNIFICANT CHANGE UP (ref 2–14)
NEUTROPHILS # BLD AUTO: 3.68 K/UL — SIGNIFICANT CHANGE UP (ref 1.8–7.4)
NEUTROPHILS NFR BLD AUTO: 52.7 % — SIGNIFICANT CHANGE UP (ref 43–77)
NRBC # BLD: 0 /100 WBCS — SIGNIFICANT CHANGE UP (ref 0–0)
PCO2 BLDV: 40 MMHG — LOW (ref 42–55)
PCO2 BLDV: 50 MMHG — SIGNIFICANT CHANGE UP (ref 42–55)
PH BLDV: 7.21 — LOW (ref 7.32–7.43)
PH BLDV: 7.28 — LOW (ref 7.32–7.43)
PLATELET # BLD AUTO: 170 K/UL — SIGNIFICANT CHANGE UP (ref 150–400)
PO2 BLDV: 18 MMHG — LOW (ref 25–45)
PO2 BLDV: 49 MMHG — HIGH (ref 25–45)
POTASSIUM BLDV-SCNC: 5.6 MMOL/L — HIGH (ref 3.5–5.1)
POTASSIUM BLDV-SCNC: 6.4 MMOL/L — CRITICAL HIGH (ref 3.5–5.1)
POTASSIUM SERPL-MCNC: 5.7 MMOL/L — HIGH (ref 3.5–5.3)
POTASSIUM SERPL-MCNC: 5.8 MMOL/L — HIGH (ref 3.5–5.3)
POTASSIUM SERPL-SCNC: 5.7 MMOL/L — HIGH (ref 3.5–5.3)
POTASSIUM SERPL-SCNC: 5.8 MMOL/L — HIGH (ref 3.5–5.3)
PROT SERPL-MCNC: 7.8 G/DL — SIGNIFICANT CHANGE UP (ref 6–8.3)
PROTHROM AB SERPL-ACNC: 12.8 SEC — SIGNIFICANT CHANGE UP (ref 10.5–13.4)
RBC # BLD: 3.45 M/UL — LOW (ref 4.2–5.8)
RBC # FLD: 19.2 % — HIGH (ref 10.3–14.5)
SAO2 % BLDV: 26.3 % — LOW (ref 67–88)
SAO2 % BLDV: 83.3 % — SIGNIFICANT CHANGE UP (ref 67–88)
SODIUM SERPL-SCNC: 141 MMOL/L — SIGNIFICANT CHANGE UP (ref 135–145)
SODIUM SERPL-SCNC: 141 MMOL/L — SIGNIFICANT CHANGE UP (ref 135–145)
WBC # BLD: 6.98 K/UL — SIGNIFICANT CHANGE UP (ref 3.8–10.5)
WBC # FLD AUTO: 6.98 K/UL — SIGNIFICANT CHANGE UP (ref 3.8–10.5)

## 2023-06-02 PROCEDURE — 99222 1ST HOSP IP/OBS MODERATE 55: CPT | Mod: GC

## 2023-06-02 PROCEDURE — 73620 X-RAY EXAM OF FOOT: CPT | Mod: 26,50

## 2023-06-02 PROCEDURE — 76770 US EXAM ABDO BACK WALL COMP: CPT | Mod: 26

## 2023-06-02 PROCEDURE — 99291 CRITICAL CARE FIRST HOUR: CPT

## 2023-06-02 RX ORDER — SODIUM CHLORIDE 9 MG/ML
500 INJECTION INTRAMUSCULAR; INTRAVENOUS; SUBCUTANEOUS ONCE
Refills: 0 | Status: COMPLETED | OUTPATIENT
Start: 2023-06-02 | End: 2023-06-02

## 2023-06-02 RX ORDER — ESOMEPRAZOLE MAGNESIUM 40 MG/1
1 CAPSULE, DELAYED RELEASE ORAL
Refills: 0 | DISCHARGE

## 2023-06-02 RX ORDER — METOPROLOL TARTRATE 50 MG
50 TABLET ORAL DAILY
Refills: 0 | Status: DISCONTINUED | OUTPATIENT
Start: 2023-06-02 | End: 2023-06-07

## 2023-06-02 RX ORDER — INSULIN LISPRO 100/ML
VIAL (ML) SUBCUTANEOUS AT BEDTIME
Refills: 0 | Status: DISCONTINUED | OUTPATIENT
Start: 2023-06-02 | End: 2023-06-07

## 2023-06-02 RX ORDER — DEXTROSE 50 % IN WATER 50 %
25 SYRINGE (ML) INTRAVENOUS ONCE
Refills: 0 | Status: DISCONTINUED | OUTPATIENT
Start: 2023-06-02 | End: 2023-06-07

## 2023-06-02 RX ORDER — ATORVASTATIN CALCIUM 80 MG/1
40 TABLET, FILM COATED ORAL AT BEDTIME
Refills: 0 | Status: DISCONTINUED | OUTPATIENT
Start: 2023-06-02 | End: 2023-06-07

## 2023-06-02 RX ORDER — HEPARIN SODIUM 5000 [USP'U]/ML
5000 INJECTION INTRAVENOUS; SUBCUTANEOUS EVERY 8 HOURS
Refills: 0 | Status: DISCONTINUED | OUTPATIENT
Start: 2023-06-02 | End: 2023-06-07

## 2023-06-02 RX ORDER — DEXTROSE 50 % IN WATER 50 %
12.5 SYRINGE (ML) INTRAVENOUS ONCE
Refills: 0 | Status: DISCONTINUED | OUTPATIENT
Start: 2023-06-02 | End: 2023-06-07

## 2023-06-02 RX ORDER — ASPIRIN/CALCIUM CARB/MAGNESIUM 324 MG
1 TABLET ORAL
Refills: 0 | DISCHARGE

## 2023-06-02 RX ORDER — ATORVASTATIN CALCIUM 80 MG/1
1 TABLET, FILM COATED ORAL
Refills: 0 | DISCHARGE

## 2023-06-02 RX ORDER — DEXTROSE 50 % IN WATER 50 %
50 SYRINGE (ML) INTRAVENOUS ONCE
Refills: 0 | Status: COMPLETED | OUTPATIENT
Start: 2023-06-02 | End: 2023-06-02

## 2023-06-02 RX ORDER — INSULIN LISPRO 100/ML
VIAL (ML) SUBCUTANEOUS
Refills: 0 | Status: DISCONTINUED | OUTPATIENT
Start: 2023-06-02 | End: 2023-06-07

## 2023-06-02 RX ORDER — CILOSTAZOL 100 MG/1
100 TABLET ORAL
Refills: 0 | Status: DISCONTINUED | OUTPATIENT
Start: 2023-06-02 | End: 2023-06-07

## 2023-06-02 RX ORDER — CLOPIDOGREL BISULFATE 75 MG/1
75 TABLET, FILM COATED ORAL DAILY
Refills: 0 | Status: DISCONTINUED | OUTPATIENT
Start: 2023-06-02 | End: 2023-06-07

## 2023-06-02 RX ORDER — SODIUM CHLORIDE 9 MG/ML
1000 INJECTION, SOLUTION INTRAVENOUS
Refills: 0 | Status: DISCONTINUED | OUTPATIENT
Start: 2023-06-02 | End: 2023-06-07

## 2023-06-02 RX ORDER — ASPIRIN/CALCIUM CARB/MAGNESIUM 324 MG
325 TABLET ORAL DAILY
Refills: 0 | Status: DISCONTINUED | OUTPATIENT
Start: 2023-06-02 | End: 2023-06-07

## 2023-06-02 RX ORDER — METOPROLOL TARTRATE 50 MG
1 TABLET ORAL
Refills: 0 | DISCHARGE

## 2023-06-02 RX ORDER — CILOSTAZOL 100 MG/1
1 TABLET ORAL
Refills: 0 | DISCHARGE

## 2023-06-02 RX ORDER — SITAGLIPTIN 50 MG/1
1 TABLET, FILM COATED ORAL
Refills: 0 | DISCHARGE

## 2023-06-02 RX ORDER — FERROUS SULFATE 325(65) MG
1 TABLET ORAL
Refills: 0 | DISCHARGE

## 2023-06-02 RX ORDER — INSULIN HUMAN 100 [IU]/ML
5 INJECTION, SOLUTION SUBCUTANEOUS ONCE
Refills: 0 | Status: COMPLETED | OUTPATIENT
Start: 2023-06-02 | End: 2023-06-02

## 2023-06-02 RX ORDER — GLUCAGON INJECTION, SOLUTION 0.5 MG/.1ML
1 INJECTION, SOLUTION SUBCUTANEOUS ONCE
Refills: 0 | Status: DISCONTINUED | OUTPATIENT
Start: 2023-06-02 | End: 2023-06-07

## 2023-06-02 RX ORDER — SODIUM ZIRCONIUM CYCLOSILICATE 10 G/10G
10 POWDER, FOR SUSPENSION ORAL ONCE
Refills: 0 | Status: COMPLETED | OUTPATIENT
Start: 2023-06-02 | End: 2023-06-02

## 2023-06-02 RX ORDER — DEXTROSE 50 % IN WATER 50 %
15 SYRINGE (ML) INTRAVENOUS ONCE
Refills: 0 | Status: DISCONTINUED | OUTPATIENT
Start: 2023-06-02 | End: 2023-06-07

## 2023-06-02 RX ADMIN — Medication 50 MILLILITER(S): at 14:07

## 2023-06-02 RX ADMIN — Medication 50 MILLIGRAM(S): at 21:38

## 2023-06-02 RX ADMIN — INSULIN HUMAN 5 UNIT(S): 100 INJECTION, SOLUTION SUBCUTANEOUS at 14:05

## 2023-06-02 RX ADMIN — CILOSTAZOL 100 MILLIGRAM(S): 100 TABLET ORAL at 21:38

## 2023-06-02 RX ADMIN — SODIUM ZIRCONIUM CYCLOSILICATE 10 GRAM(S): 10 POWDER, FOR SUSPENSION ORAL at 14:08

## 2023-06-02 RX ADMIN — ATORVASTATIN CALCIUM 40 MILLIGRAM(S): 80 TABLET, FILM COATED ORAL at 21:39

## 2023-06-02 RX ADMIN — SODIUM ZIRCONIUM CYCLOSILICATE 10 GRAM(S): 10 POWDER, FOR SUSPENSION ORAL at 22:43

## 2023-06-02 RX ADMIN — HEPARIN SODIUM 5000 UNIT(S): 5000 INJECTION INTRAVENOUS; SUBCUTANEOUS at 21:39

## 2023-06-02 RX ADMIN — SODIUM CHLORIDE 500 MILLILITER(S): 9 INJECTION INTRAMUSCULAR; INTRAVENOUS; SUBCUTANEOUS at 14:07

## 2023-06-02 NOTE — H&P ADULT - ASSESSMENT
72M with h/o T2DM, CKD, PAD (s/p R & L SFA 04/07/2023 & 04/10/2023 respectively) c/b tarsal wounds that were c/f dry gangrene discharged to complete 6 weeks of ABx (to complete -5/25) sent in by vascular surgeon.    PRIYA/CKD  - hold hctz and lisinopril  - monitor cre  - avoid nephrotoxins  - nephrology consult    HTN  - c/w metoprolol    diabeyes  - fs qid  - hgb a1c  - insulin ss    PAD  - c/w asa. Plavix,  statin, pletal  - was seen by vascular    bilateral dehisced wounds  - seen by podiatry  - sutures removed  - no evidence of infection    anemia  - FOBT    dvt px  - sq heparin

## 2023-06-02 NOTE — DISCHARGE NOTE PROVIDER - NSDCFUADDAPPT_GEN_ALL_CORE_FT
Podiatry Discharge Instructions:  Follow up: Please follow up with Dr. Hernandez within 1 week of discharge from the hospital, please call [phone] for appointment and discuss that you recently were seen in the hospital.  Wound Care: Please apply betadine paint to bilateral feet wound followed 4x4 gauze, abd and loyda until your follow up appointment.  Weight bearing: Please weight bear as tolerated in a surgical shoe.  Antibiotics: Please continue as instructed. APPTS ARE READY TO BE MADE: [ ] YES    Best Family or Patient Contact (if needed):    Additional Information about above appointments (if needed):    1: Follow up with PCP Dr. Radha Henning   2: Follow up with podiatrist Dr. Hernandez within 1 week of discharge. Call [phone] to schedule   3:            APPTS ARE READY TO BE MADE: [X] YES    Best Family or Patient Contact (if needed):    Additional Information about above appointments (if needed):    1: Follow up with PCP Dr. Radha Henning   2: Follow up with podiatrist Dr. Hernandez within 1 week of discharge. Call [phone] to schedule   3:            APPTS ARE READY TO BE MADE: [X] YES    Best Family or Patient Contact (if needed):    Additional Information about above appointments (if needed):    1: Follow up with PCP Dr. Radha Henning   2: Follow up with podiatrist Dr. Hernandez within 1 week of discharge. Call [phone] to schedule   3:     Patient is scheduled with Dr. Mohsen Bannazadeh on 6/27 at 1:45pm at [address] [place] [address], [address]. Patient will await a call back from Dr. Radha Henning's office. Patient is scheduled with Dr. Silvio Hernandez on 6/20/23 at 11:30am at [facility]. Patient is scheduled with Dr. Cedric Liu on 6/19/23 at 11:30am at [address] [place] [address], [address]. Patient's wife was advised of appointment details.    APPTS ARE READY TO BE MADE: [X] YES    Best Family or Patient Contact (if needed):    Additional Information about above appointments (if needed):    1: Follow up with PCP Dr. Radha Henning   2: Follow up with podiatrist Dr. Hernandez within 1 week of discharge. Call [phone] to schedule   3:     Patient is scheduled with Dr. Mohsen Bannazadeh on 6/27 at 1:45pm at [address] [place] [address], [address]. Patient will await a call back from Dr. Radha Henning's office. Patient is scheduled with Dr. Silvio Hernandez on 6/20/23 at 11:30am at [address] [place] [address].-This was rescheduled at the patient's request.  Patient was rescheduled with Dr. Silvio Hernandez on 6/29/23 at 2:45PM at [address] [place] [address].  Patient is scheduled with Dr. Cedric Liu on 6/19/23 at 11:30am at [address] [place] [address], [address]. Patient's wife was advised of appointment details. -This was rescheduled at the patient's request.  Patient was rescheduled with Dr. Cedric Liu on 07/05/23 at 1:00PM at [address] [place] [address], [address].

## 2023-06-02 NOTE — DISCHARGE NOTE PROVIDER - CARE PROVIDER_API CALL
Radha Henning  Internal Medicine  [address] [place] [address] [place] [address]  Phone: ([phone]  Fax: ()-  Follow Up Time:    Radha Henning  Internal Medicine  [address] [place] [address]  Phone: ()-  Fax: ()-  Follow Up Time:     Silvio Hernandez  Foot and Ankle Surgery  [address] [facility], [place] [address]  Phone: [phone]  Fax: [phone]  Follow Up Time:     Bannazadeh, Mohsen  Vascular Surgery  [address] [facility], [address] [address]  Phone: [phone]  Fax: [phone]  Follow Up Time:     Cedric Liu  Cardiovascular Disease  [address] [facility], [address]  [place] [address]  Phone: [phone]  Fax: [phone]  Follow Up Time:

## 2023-06-02 NOTE — PATIENT PROFILE ADULT - FALL HARM RISK - HARM RISK INTERVENTIONS

## 2023-06-02 NOTE — ED ADULT NURSE NOTE - OBJECTIVE STATEMENT
Patient is a 72 year old male sent in from MD Mohsen (Vascular) due to an abnormal result. Patient reports going to follow up appointment for his amputated first and second toes bilaterally and MD needs to put a stent in right leg. On assessment A&Ox4, breathing comfortably on room air, no accessory muscle use, no JVD, no edema noted, strong bilateral peripheral pulses, abdomen is soft, nondistended, nontender, first and second toes bilaterally amputated - dry, no drainage and intact stiches. Patient denies headache, dizziness, chest pain, palpitations, cough, SOB, abdominal pain, n/v/d, urinary symptoms, fevers, chills, weakness at this time.

## 2023-06-02 NOTE — DISCHARGE NOTE PROVIDER - NSDCCPCAREPLAN_GEN_ALL_CORE_FT
PRINCIPAL DISCHARGE DIAGNOSIS  Diagnosis: Acute kidney injury superimposed on CKD  Assessment and Plan of Treatment: Avoid taking (NSAIDs) - (ex: Ibuprofen, Advil, Celebrex, Naprosyn)  Avoid taking any nephrotoxic agents (can harm kidneys) - Intravenous contrast for diagnostic testing, combination cold medications.  Have all medications adjusted for your renal function by your Health Care Provider.  Blood pressure control is important.  Take all medication as prescribed.        SECONDARY DISCHARGE DIAGNOSES  Diagnosis: PAD (peripheral artery disease)  Assessment and Plan of Treatment: Please follow up with Dr. Hernandez within 1 week of discharge from the hospital, please call [phone] for appointment and discuss that you recently were seen in the hospital.  Wound Care: Please apply betadine paint to bilateral feet wound followed 4x4 gauze, abd and loyda until your follow up appointment.  Weight bearing: Please weight bear as tolerated in a surgical shoe.    Diagnosis: HTN (hypertension)  Assessment and Plan of Treatment: Take medications for your blood pressure as recommended.  Eat a heart healthy diet that is low in saturated fats and salt, and includes whole grains, fruits, vegetables and lean protein   Exercise regularly (consult with your physician or cardiologist first); maintain a heart healthy weight.   If you smoke - quit (A resource to help you stop smoking is the [facility] – phone number [phone].). Continue to follow with your primary physician or cardiologist.   Seek medical help for dizziness, Lightheadedness, Blurry vision, Headache, Chest pain, Shortness of breath  Follow up with your medical doctor to establish long term blood pressure treatment goals.      Diagnosis: Diabetes mellitus  Assessment and Plan of Treatment: Make sure you get your HgA1c checked every three months.  If you take oral diabetes medications, check your blood glucose two times a day.  If you take insulin, check your blood glucose before meals and at bedtime.  It's important not to skip any meals.  Keep a log of your blood glucose results and always take it with you to your doctor appointments.  Keep a list of your current medications including injectables and over the counter medications and bring this medication list with you to all your doctor appointments.  If you have not seen your ophthalmologist this year call for appointment.  Check your feet daily for redness, sores, or openings. Do not self treat. If no improvement in two days call your primary care physician for an appointment.  Low blood sugar (hypoglycemia) is a blood sugar below 70mg/dl. Check your blood sugar if you feel signs/symptoms of hypoglycemia. If your blood sugar is below 70 take 15 grams of carbohydrates (ex 4 oz of apple juice, 3-4 glucose tablets, or 4-6 oz of regular soda) wait 15 minutes and repeat blood sugar to make sure it comes up above 70.  If your blood sugar is above 70 and you are due for a meal, have a meal.  If you are not due for a meal have a snack.  This snack helps keeps your blood sugar at a safe range.    Diagnosis: Wound of foot  Assessment and Plan of Treatment: Local wound care per podiatry   If signs of infection, worsening pain or symptoms, contact provider  Podiatry Discharge Instructions:  Follow up: Please follow up with Dr. Hernandez within 1 week of discharge from the hospital, please call [phone] for appointment and discuss that you recently were seen in the hospital.  Wound Care: Please apply betadine paint to bilateral feet wound followed 4x4 gauze, abd and loyda until your follow up appointment.  Weight bearing: Please weight bear as tolerated in a surgical shoe.  Antibiotics: Please continue as instructed.     PRINCIPAL DISCHARGE DIAGNOSIS  Diagnosis: Acute kidney injury superimposed on CKD  Assessment and Plan of Treatment: Avoid taking (NSAIDs) - (ex: Ibuprofen, Advil, Celebrex, Naprosyn)  Avoid taking any nephrotoxic agents (can harm kidneys) - Intravenous contrast for diagnostic testing, combination cold medications.  Have all medications adjusted for your renal function by your Health Care Provider.  Blood pressure control is important.  Take all medication as prescribed.  Continue with sodium bicarb supplement.      SECONDARY DISCHARGE DIAGNOSES  Diagnosis: PAD (peripheral artery disease)  Assessment and Plan of Treatment: Please follow up with Dr. Hernandez within 1 week of discharge from the hospital, please call [phone] for appointment and discuss that you recently were seen in the hospital.  Wound Care: Please apply betadine paint to bilateral feet wound followed 4x4 gauze, abd and loyda until your follow up appointment.  Weight bearing: Please weight bear as tolerated in a surgical shoe.    Diagnosis: HTN (hypertension)  Assessment and Plan of Treatment: Take medications for your blood pressure as recommended.  Eat a heart healthy diet that is low in saturated fats and salt, and includes whole grains, fruits, vegetables and lean protein   Exercise regularly (consult with your physician or cardiologist first); maintain a heart healthy weight.   If you smoke - quit (A resource to help you stop smoking is the [facility] – phone number 446-905-9140.). Continue to follow with your primary physician or cardiologist.   Seek medical help for dizziness, Lightheadedness, Blurry vision, Headache, Chest pain, Shortness of breath  Follow up with your medical doctor to establish long term blood pressure treatment goals.      Diagnosis: Diabetes mellitus  Assessment and Plan of Treatment: Make sure you get your HgA1c checked every three months.  If you take oral diabetes medications, check your blood glucose two times a day.  If you take insulin, check your blood glucose before meals and at bedtime.  It's important not to skip any meals.  Keep a log of your blood glucose results and always take it with you to your doctor appointments.  Keep a list of your current medications including injectables and over the counter medications and bring this medication list with you to all your doctor appointments.  If you have not seen your ophthalmologist this year call for appointment.  Check your feet daily for redness, sores, or openings. Do not self treat. If no improvement in two days call your primary care physician for an appointment.  Low blood sugar (hypoglycemia) is a blood sugar below 70mg/dl. Check your blood sugar if you feel signs/symptoms of hypoglycemia. If your blood sugar is below 70 take 15 grams of carbohydrates (ex 4 oz of apple juice, 3-4 glucose tablets, or 4-6 oz of regular soda) wait 15 minutes and repeat blood sugar to make sure it comes up above 70.  If your blood sugar is above 70 and you are due for a meal, have a meal.  If you are not due for a meal have a snack.  This snack helps keeps your blood sugar at a safe range.    Diagnosis: Wound of foot  Assessment and Plan of Treatment: Local wound care per podiatry   If signs of infection, worsening pain or symptoms, contact provider  Podiatry Discharge Instructions:  Follow up: Please follow up with Dr. Hernandez within 1 week of discharge from the hospital, please call [phone] for appointment and discuss that you recently were seen in the hospital.  Wound Care: Please apply betadine paint to bilateral feet wound followed 4x4 gauze, abd and loyda until your follow up appointment.  Weight bearing: Please weight bear as tolerated in a surgical shoe.  Antibiotics: Please continue as instructed.

## 2023-06-02 NOTE — ASSESSMENT
[FreeTextEntry1] : Patient with bilateral lower extremity critical limb ischemia status post bilateral SFA intervention.  He has also chronic kidney disease and coronary artery disease.  Patient has nonhealing right first toe amputation site.  Patient to go to emergency for medical optimization, antibiotics and right lower extremity intervention.

## 2023-06-02 NOTE — DISCHARGE NOTE PROVIDER - NSDCMRMEDTOKEN_GEN_ALL_CORE_FT
aspirin 325 mg oral tablet: 1 tab(s) orally once a day  atorvastatin 40 mg oral tablet: 1 tab(s) orally once a day (at bedtime)  cilostazol 100 mg oral tablet: 1 tab(s) orally 2 times a day  clopidogrel 75 mg oral tablet: 1 tab(s) orally once a day  ferrous sulfate 325 mg (65 mg elemental iron) oral tablet: 1 tab(s) orally once a day  glipiZIDE 10 mg oral tablet, extended release: 1 tab(s) orally once a day  hydroCHLOROthiazide 12.5 mg oral capsule: 12.5 milligram(s) orally once a day  isosorbide mononitrate 30 mg oral tablet, extended release: 1 tab(s) orally once a day (in the morning)  Januvia 50 mg oral tablet: 1 tab(s) orally once a day  lisinopril 20 mg oral tablet: 1 orally once a day  metoprolol 50m tab daily NOTE: UNABLE TO VERIFY WITH PHARMACY. AS PER WIFE ONCE DAILY. [facility] LAST FILL TARTRATE BID IN 2022  NexIUM 40 mg oral delayed release capsule: 1 cap(s) orally once a day   aspirin 325 mg oral tablet: 1 tab(s) orally once a day  atorvastatin 40 mg oral tablet: 1 tab(s) orally once a day (at bedtime)  cilostazol 100 mg oral tablet: 1 tab(s) orally 2 times a day  clopidogrel 75 mg oral tablet: 1 tab(s) orally once a day  ferrous sulfate 325 mg (65 mg elemental iron) oral tablet: 1 tab(s) orally once a day  glipiZIDE 10 mg oral tablet, extended release: 1 tab(s) orally once a day  Januvia 50 mg oral tablet: 1 tab(s) orally once a day  NexIUM 40 mg oral delayed release capsule: 1 cap(s) orally once a day  sodium bicarbonate 650 mg oral tablet: 2 tab(s) orally 2 times a day

## 2023-06-02 NOTE — ED PROVIDER NOTE - CARE PLAN
1 Principal Discharge DX:	Wound of foot  Secondary Diagnosis:	ARF (acute renal failure)  Secondary Diagnosis:	Hyperkalemia

## 2023-06-02 NOTE — ED PROVIDER NOTE - PROGRESS NOTE DETAILS
Josiah Hairston MD PGY-2  Spoke with Surg team, who rec med admission for "foot wound". Evaluation in MDM. Unclear reason for admission. Josiah Hairston MD PGY-2  Spoke with surg team who will see patient. They don't know why pt would require admission. No SIRS vitals & normal WBC.   - ESR, CRP, XR  - Will page Podiatry Josiah Hairston MD PGY-2  Attempted to reach PCP, wrong number on google. Given another number, went to office mailbox that's not setup. Will admit to unattached for PRIYA on CKD, Hyperkalemia.    K 5.8, ECG     Podiatry paged again. Josiah Hairston MD PGY-2  Attempted to reach PCP, wrong number on google. Given another number, went to office mailbox that's not setup. Will admit to unattached for PRIYA on CKD, Hyperkalemia.    K 5.8, ECG     Podiatry paged again, will see patient.

## 2023-06-02 NOTE — PROVIDER CONTACT NOTE (OTHER) - ASSESSMENT
patient is poor historian. he cannot recall if he has taken any BP medications . alert and oriented x2; denies any chest pain, sob or ha. NSR on tele monitor maintained.
alert and oriented x2-3; denies any chest pain, palpitations sob or headache. patient states he is unsure of what his "baseline" blood pressure is. NSR on tele monitor maintained

## 2023-06-02 NOTE — ED PROVIDER NOTE - ATTENDING CONTRIBUTION TO CARE
Dr. Manning: I have personally performed a face to face bedside history and physical examination of this patient. I have discussed the history, examination, review of systems, assessment and plan of management with the resident. I have reviewed the electronic medical record and amended it to reflect my history, review of systems, physical exam, assessment and plan.    Patient was critically ill with a high probability of imminent or life threatening deterioration.  I have performed direct patient care (not related to procedure), additional history taking, interpretation of diagnostic studies, documentation, consultation with other physicians, telephone consultation with the patient's family  I have personally and independently provided the amount of critical care time documented below excluding time spent on separate procedures.  32 mins    Dr. Manning: 72-year-old male history of diabetes, CKD, peripheral arterial disease status post right and left SFA in April, bilateral tarsal wounds with dry gangrene treated with Bactrim as an outpatient, sent by vascular surgeon to be admitted to medicine. patient denies fevers, chills, drainage from wounds, chest pain, shortness of breath, nausea, vomiting, abdominal pain, decreased urination, or any complaints.  Patient is accompanied by his wife who    On exam patient is chronically ill-appearing, no acute distress, mucous membranes moist, regular rate and rhythm, lungs clear to auscultation bilaterally, abdomen soft nontender nondistended, no pedal edema or calf tenderness to palpation.  Bilateral tarsal wounds with dry gangrene, no erythema or warmth, no drainage.    Discussed with vascular surgery resident who  will see the patient at bedside.  No signs or symptoms of infection at this time so will not give antibiotics.  Found to have acute on chronic renal failure.  Will admit patient to medicine.  Also found to be hyperkalemic with no EKG changes.  Will treat with Lokelma and insulin.

## 2023-06-02 NOTE — ED PROVIDER NOTE - PRIOR EKG STATUS
ECG 2016 very similar, incl borderilne ST segment elevation in precordium/the EKG is unchanged from prior EKG

## 2023-06-02 NOTE — ED ADULT NURSE REASSESSMENT NOTE - NS ED NURSE REASSESS COMMENT FT1
report given to [facility]  pt left stable, blood repeated, alerted and oriented x3, no sings of acute distress noted

## 2023-06-02 NOTE — ED PROVIDER NOTE - OBJECTIVE STATEMENT
72M with h/o T2DM, CKD, PAD (s/p R & L SFA 04/07/2023 & 04/10/2023 respectively) c/b tarsal wounds that were c/f dry gangrene discharged to complete 6 weeks of ABx (to complete -5/25) sent in by vascular surgeon.

## 2023-06-02 NOTE — H&P ADULT - ENDOCRINE
[facility]. Donis states that information was faxed over on 10/13 and 10/26. Donis would like a call back from the nurse.    Phone [phone].   negative

## 2023-06-02 NOTE — DISCHARGE NOTE PROVIDER - NSDCFUADDINST_GEN_ALL_CORE_FT
Podiatry Discharge Instructions:  Follow up: Please follow up with Dr. Hernandez within 1 week of discharge from the hospital, please call [phone] for appointment and discuss that you recently were seen in the hospital.  Wound Care: Please apply betadine paint to bilateral feet wound followed 4x4 gauze, abd and loyda until your follow up appointment.  Weight bearing: Please weight bear as tolerated in a surgical shoe.  Antibiotics: Please continue as instructed. Podiatry Discharge Instructions:  Follow up: Please follow up with Dr. Dsah within 1 week of discharge from the hospital, please call [phone] for appointment and discuss that you recently were seen in the hospital.  Wound Care: Please apply betadine paint to bilateral feet wound followed 4x4 gauze, abd and loyda until your follow up appointment.  Weight bearing: Please weight bear as tolerated in a surgical shoe.  Antibiotics: Please continue as instructed. Podiatry Discharge Instructions:  Follow up: Please follow up with Dr. Dash/Dr. Hernandez within 1 week of discharge from the hospital, please call [phone] for appointment and discuss that you recently were seen in the hospital.  Wound Care: Please apply betadine paint to bilateral feet wound followed 4x4 gauze, abd and loyda until your follow up appointment.  Weight bearing: Please weight bear as tolerated in a surgical shoe.

## 2023-06-02 NOTE — PROVIDER CONTACT NOTE (OTHER) - ACTION/TREATMENT ORDERED:
no interventions. recheck blood pressure around 1700
no intervention. keep monitoring tele montioring and BP.

## 2023-06-02 NOTE — DISCHARGE NOTE PROVIDER - NSDCFUSCHEDAPPT_GEN_ALL_CORE_FT
Bannazadeh, Mohsen  [facility]  VASCULAR [address]  Scheduled Appointment: 06/22/2023     Bannazadeh, Mohsen  [facility]  VASCULAR [address]  Scheduled Appointment: 06/29/2023

## 2023-06-02 NOTE — ED PROVIDER NOTE - PHYSICAL EXAMINATION
Dr. Manning: On exam patient is chronically ill-appearing, no acute distress, mucous membranes moist, regular rate and rhythm, lungs clear to auscultation bilaterally, abdomen soft nontender nondistended, no pedal edema or calf tenderness to palpation.  Bilateral tarsal wounds with dry gangrene, no erythema or warmth, no drainage.

## 2023-06-02 NOTE — PHYSICAL EXAM
[de-identified] : Alert and oriented no acute distress [de-identified] : Head and neck within normal limit [de-identified] : Clear to auscultation bilaterally [de-identified] : Regular rate and rhythm [FreeTextEntry1] : Palpable bilateral femoral pulses.\par Bilateral pedal signals present\par \par Right first toe amputation site had necrotic wound

## 2023-06-02 NOTE — CONSULT NOTE ADULT - SUBJECTIVE AND OBJECTIVE BOX
HPI:  72M with PMH of CKD4, PAD s/p stent, CAD, HTN, T2DM, smoker s/p R SFA stent (4/7), and L SFA stent (4/10), s/p b/l partial 1st and 2nd ray resection (4/12) presenting after being sent in by outpatient provider. Patient states in usual state of health, has been walking with rolling walker without difficulty. Denies pain in legs or feet at rest or in motion. Denies fevers or chills.       PAST MEDICAL & SURGICAL HISTORY:  Essential hypertension      Hyperlipidemia, unspecified hyperlipidemia type      CAD (coronary artery disease)      PVD (peripheral vascular disease) with claudication      Stented coronary artery      Type 2 diabetes mellitus      Stage 4 chronic kidney disease      COVID-19 vaccination refused      Stented coronary artery      PAD (peripheral artery disease)          MEDICATIONS  (STANDING):  dextrose 50% Injectable 50 milliLiter(s) IV Push once  insulin regular  human recombinant 5 Unit(s) IV Push once  sodium chloride 0.9% Bolus 500 milliLiter(s) IV Bolus once  sodium zirconium cyclosilicate 10 Gram(s) Oral Once    MEDICATIONS  (PRN):      Allergies    penicillin (Unknown)    Intolerances        FAMILY HISTORY:  Family history of essential hypertension            Physical Exam:  General: NAD, resting comfortably  HEENT: NC/AT, EOMI, normal hearing  Pulmonary: normal resp effort  Cardiovascular: RRR  Abdominal: soft, ND/NT, no organomegaly  Extremities: 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 Palpable PT pulses bilaterally, DP signals present, R stronger than L. No motor or sensory deficits.   Neuro: A/O x 3, CNs II-XII grossly intact, normal sensation, no focal deficits    Vital Signs Last 24 Hrs  T(C): 36.7 (02 Jun 2023 12:30), Max: 36.9 (02 Jun 2023 11:31)  T(F): 98.1 (02 Jun 2023 12:30), Max: 98.4 (02 Jun 2023 11:31)  HR: 65 (02 Jun 2023 12:30) (65 - 77)  BP: 138/71 (02 Jun 2023 12:30) (120/67 - 138/71)  BP(mean): --  RR: 17 (02 Jun 2023 12:30) (17 - 18)  SpO2: 99% (02 Jun 2023 12:30) (99% - 100%)    Parameters below as of 02 Jun 2023 12:30  Patient On (Oxygen Delivery Method): room air        I&O's Summary          LABS:                        9.9    6.98  )-----------( 170      ( 02 Jun 2023 12:57 )             29.2     06-02    141  |  113<H>  |  66<H>  ----------------------------<  91  5.8<H>   |  18<L>  |  3.69<H>    Ca    8.8      02 Jun 2023 12:57    TPro  7.8  /  Alb  4.2  /  TBili  0.3  /  DBili  x   /  AST  10  /  ALT  10  /  AlkPhos  93  06-02    PT/INR - ( 02 Jun 2023 12:57 )   PT: 12.8 sec;   INR: 1.11 ratio         PTT - ( 02 Jun 2023 12:57 )  PTT:27.5 sec    CAPILLARY BLOOD GLUCOSE        LIVER FUNCTIONS - ( 02 Jun 2023 12:57 )  Alb: 4.2 g/dL / Pro: 7.8 g/dL / ALK PHOS: 93 U/L / ALT: 10 U/L / AST: 10 U/L / GGT: x             Cultures:      RADIOLOGY & ADDITIONAL STUDIES:

## 2023-06-02 NOTE — CONSULT NOTE ADULT - SUBJECTIVE AND OBJECTIVE BOX
Patient is a 72y old  Male who presents with a chief complaint of sent IN by MD.    HPI:72M with h/o T2DM, CKD, PAD (s/p R & L SFA 04/07/2023 & 04/10/2023 respectively) c/b tarsal wounds that were c/f dry gangrene discharged to complete 6 weeks of ABx (to complete -5/25) sent in by vascular surgeon.      PAST MEDICAL & SURGICAL HISTORY:  Essential hypertension      Hyperlipidemia, unspecified hyperlipidemia type      CAD (coronary artery disease)      PVD (peripheral vascular disease) with claudication      Stented coronary artery      Type 2 diabetes mellitus      Stage 4 chronic kidney disease      COVID-19 vaccination refused      Stented coronary artery      PAD (peripheral artery disease)          MEDICATIONS  (STANDING):    MEDICATIONS  (PRN):      Allergies    penicillin (Unknown)    Intolerances        VITALS:    Vital Signs Last 24 Hrs  T(C): 36.4 (02 Jun 2023 15:38), Max: 36.9 (02 Jun 2023 11:31)  T(F): 97.5 (02 Jun 2023 15:38), Max: 98.4 (02 Jun 2023 11:31)  HR: 90 (02 Jun 2023 15:38) (65 - 90)  BP: 167/78 (02 Jun 2023 15:38) (120/67 - 167/78)  BP(mean): --  RR: 18 (02 Jun 2023 15:38) (17 - 18)  SpO2: 100% (02 Jun 2023 15:38) (99% - 100%)    Parameters below as of 02 Jun 2023 15:38  Patient On (Oxygen Delivery Method): room air        LABS:                          9.9    6.98  )-----------( 170      ( 02 Jun 2023 12:57 )             29.2       06-02    141  |  113<H>  |  66<H>  ----------------------------<  91  5.8<H>   |  18<L>  |  3.69<H>    Ca    8.8      02 Jun 2023 12:57    TPro  7.8  /  Alb  4.2  /  TBili  0.3  /  DBili  x   /  AST  10  /  ALT  10  /  AlkPhos  93  06-02      CAPILLARY BLOOD GLUCOSE          PT/INR - ( 02 Jun 2023 12:57 )   PT: 12.8 sec;   INR: 1.11 ratio         PTT - ( 02 Jun 2023 12:57 )  PTT:27.5 sec    LOWER EXTREMITY PHYSICAL EXAM:    Vascular: DP/PT 0/4, B/L, CFT <3 seconds B/L, Temperature gradient warm to cool, B/L.   Neuro: Epicritic sensation diminished to the level of the toes, B/L.  Musculoskeletal/Ortho: s/p bilateral partial 1st and 2nd ray resection (DOS 4/12/23)  Skin: 4/12 s/p bilateral partial first and second ray resection dehisced wounds with well adhered dry eschar, sutures intact, no erythema, no edema, no drainage, no pus, not acute signs of infection.     RADIOLOGY & ADDITIONAL STUDIES:     < from: Xray Foot AP + Lateral, Bilateral (06.02.23 @ 13:54) >    ACC: [id number] EXAM:  XR FOOT 2 VIEWS BI   ORDERED BY: JOVANNI MICHAEL     PROCEDURE DATE:  06/02/2023          INTERPRETATION:  XR FOOT 2 VIEWS BILATERAL    HISTORY: bilateral foot ulcers ro osteo.    VIEWS: 2 views each  IMAGES: 4    COMPARISON: Bilateral foot x-rays dated 4/12/2023 and 4/4/2023.    FINDINGS:    RIGHT OSSEOUS STRUCTURES    Fractures:  None.    Postoperative Changes: Amputation is again seen through the 1st   metatarsal neck with mild irregularity of the amputated margin that is   new from the prior. Amputation of the 2nd toe is also noted.    RIGHT JOINTS    Joint Space(s):  Maintained.    LEFT OSSEOUS STRUCTURES    Fractures:  None.    Postoperative Changes: Amputation is again seen through the 1st   metatarsal neck with mild irregularity of the amputation margin that is   new from the prior. Amputation of the 2nd toe is also noted. Overlying   soft tissue wound appears to be present.    LEFT JOINTS    Joint Space(s):  Maintained.    SOFT TISSUES:  Soft tissue wound is seenin the region of the left 2nd   toe amputation. Severe atherosclerotic calcifications are present.    IMPRESSION:  1.  Amputations are again seen through the bilateral 1st metatarsal necks   with mild irregularity of the amputated margins that is newfrom the   prior. This may reflect osteomyelitis with the given history.  2.  MRI may be helpful for further evaluation as warranted.    --- End of Report ---            TORITO SYED MD; Attending Radiologist  This document has been electronically signed. Jun 2 2023  3:39PM    < end of copied text >

## 2023-06-02 NOTE — DISCHARGE NOTE PROVIDER - PROVIDER TOKENS
PROVIDER:[TOKEN:[[id number]]] PROVIDER:[TOKEN:[[id number]]],PROVIDER:[TOKEN:[[id number]]],PROVIDER:[TOKEN:[[id number]]],PROVIDER:[TOKEN:[[id number]]]

## 2023-06-02 NOTE — H&P ADULT - NSHPLABSRESULTS_GEN_ALL_CORE
LABS:                        9.9    6.98  )-----------( 170      ( 02 Jun 2023 12:57 )             29.2     06-02    141  |  113<H>  |  66<H>  ----------------------------<  91  5.8<H>   |  18<L>  |  3.69<H>    Ca    8.8      02 Jun 2023 12:57    TPro  7.8  /  Alb  4.2  /  TBili  0.3  /  DBili  x   /  AST  10  /  ALT  10  /  AlkPhos  93  06-02    PT/INR - ( 02 Jun 2023 12:57 )   PT: 12.8 sec;   INR: 1.11 ratio         PTT - ( 02 Jun 2023 12:57 )  PTT:27.5 sec          RADIOLOGY & ADDITIONAL TESTS:

## 2023-06-02 NOTE — CONSULT NOTE ADULT - ASSESSMENT
72M with PMH of CKD4, PAD s/p stent, CAD, HTN, T2DM, smoker s/p R SFA stent (4/7), and L SFA stent (4/10), s/p b/l partial 1st and 2nd ray resection (4/12). Vascular surgery evaluation in context of PAD.    Recommendations:  - No acute vascular surgery intervention  - Appreciate medicine optimization, podiatry, nephrology recommendations  - Recommend continue PAD medications, pletal, ASA, plavix, statin       D/w fellow on behalf of attending    AURA Hernandez, PGY-2  Vascular Surgery  [id number]

## 2023-06-02 NOTE — ED PROVIDER NOTE - CLINICAL SUMMARY MEDICAL DECISION MAKING FREE TEXT BOX
72M with h/o T2DM, CKD4, PAD s/p recent admission for b/l SFA stenting, known foot wound s/p prolonged course of ABx ico ?dry gangrene (-5/25) sent from vascular surgeon. HDS w/o SIRS vitals.   - Labs, ESR/CRP, XR  - Vascular surg made aware  - likely will need podiatry C/s

## 2023-06-02 NOTE — ED ADULT TRIAGE NOTE - CHIEF COMPLAINT QUOTE
sent in by Dr. cruz to be admitted for PVD  c/o left leg pain Pt's wife states "he needs a stent on his leg"

## 2023-06-02 NOTE — ED PROVIDER NOTE - DATE/TIME 1
[FreeTextEntry1] : AF - follows w dr beltran\par no bleeding on doac\par rfm discussed\par f/u in a year- echo and crtd
02-Jun-2023 12:06

## 2023-06-02 NOTE — PATIENT PROFILE ADULT - PATIENT REPRESENTATIVE: ( YOU CAN CHOOSE ANY PERSON THAT CAN ASSIST YOU WITH YOUR HEALTH CARE PREFERENCES, DOES NOT HAVE TO BE A SPOUSE, IMMEDIATE FAMILY OR SIGNIFICANT OTHER/PARTNER)
reading glasses/ contact lens/Partially impaired: cannot see medication labels or newsprint, but can see obstacles in path, and the surrounding layout; can count fingers at arm's length
same name as above

## 2023-06-02 NOTE — HISTORY OF PRESENT ILLNESS
[FreeTextEntry1] : 72-year-old male with complex past medical history include coronary artery disease and bilateral lower extremity critical limb ischemia status post bilateral lower extremity intervention including right SFA stenting.  Patient underwent bilateral toe amputation.  He also has chronic kidney disease.  Presented for follow-up.

## 2023-06-02 NOTE — H&P ADULT - ADDITIONAL PE
DP/PT 0/4, B/L, CFT <3 seconds B/L, Temperature gradient warm to cool, B/L.   Neuro: Epicritic sensation diminished to the level of the toes, B/L.  Musculoskeletal/Ortho: s/p bilateral partial 1st and 2nd ray resection (DOS 4/12/23)  Skin: 4/12 s/p bilateral partial first and second ray resection dehisced wounds with well adhered dry eschar, sutures intact, no erythema, no edema, no drainage, no pus, not acute signs of infection.

## 2023-06-02 NOTE — CONSULT NOTE ADULT - ASSESSMENT
72 M with bilateral dehisced wounds  - Patient seen and evaluated  - Patient is afebrile, WBC 6.98, ESR (p), CRP 0.4  -  4/12 s/p bilateral partial first and second ray resection dehisced wounds with well adhered dry eschar, sutures intact, no erythema, no edema, no drainage, no pus,  not acute signs of infection.   -Verbal consent obtained bedside for removal of all sutures bilaterally using a sterile suture removal kit and a #15 and #11 blade: patient tolerated well.  - Patient already admitted to medicine  - No cultures taken 2/2 to no clinical signs of infection  - Patient stable for discharge from a podiatry standpoint  - No acute podiatry intervention planned at this time  - follow up information can be found in discharge note provider under follow up   - Discussed with attending 72 M with bilateral dehisced wounds  - Patient seen and evaluated  - Patient is afebrile, WBC 6.98, ESR (p), CRP 0.4  -  4/12 s/p bilateral partial first and second ray resection dehisced wounds with well adhered dry eschar, sutures intact, no erythema, no edema, no drainage, no pus,  not acute signs of infection.   -Verbal consent obtained bedside for removal of all sutures bilaterally using a sterile suture removal kit and a #15 and #11 blade: patient tolerated well.  - Patient already admitted to medicine  - No cultures taken 2/2 to no clinical signs of infection  - Patient stable for discharge from a podiatry standpoint  - No acute podiatry intervention planned at this time  - Pod plan local wound care pending [facility] recs   - Discussed with attending

## 2023-06-02 NOTE — DISCHARGE NOTE PROVIDER - NSFOLLOWUPCLINICS_GEN_ALL_ED_FT
[facility]  Podiatry  [address] [place] [address] - [address] [place] [address]  Phone: [phone]  Fax:

## 2023-06-02 NOTE — DISCHARGE NOTE PROVIDER - HOSPITAL COURSE
72M with h/o T2DM, CKD, PAD (s/p R & L SFA 04/07/2023 & 04/10/2023 respectively) c/b tarsal wounds that were c/f dry gangrene discharged to complete 6 weeks of ABx (to complete -5/25) sent in by vascular surgeon.    Acute kidney injury superimposed on CKD.   - Pt with PRIYA on CKD.   -During last admission, Scr noted to be 2.2-2.7, discharged on 2.23 on 4/18/23. During last admission, UA without evidence of significant hematuria or proteinuria.   -Renal sonogram without evidence of hydronephrosis.  - holding ACE/ARB at this time.     PAD  - Continue with asa, Plavix,  statin, pletal  - Status post angiogram 6/7     Low serum bicarb  - Added sodium bicarb 1300 BID.      HTN  - continue with metoprolol    Diabetes  -Most recent a1c 4.1%  - Consistent carb diet      Bilateral dehisced wounds  - Seen by podiatry, no acute podiatric intervention  - Sutures removed  - No evidence of infection      Pt has been medically cleared for discharge home w/ HHA and Home PT per Dr. Bravo and podiatry 72M with h/o T2DM, CKD, PAD (s/p R & L SFA 04/07/2023 & 04/10/2023 respectively) c/b tarsal wounds that were c/f dry gangrene discharged to complete 6 weeks of ABx (to complete -5/25) sent in by vascular surgeon.    PRIYA on CKD  during last admission, renal sonogram without evidence of hydronephrosis. UA bland. S/p gentle IVFs with improvement of SCr. Held ACEi/ARB at this time. Low serum bicarb- Added sodium bicarb supplement.    HTN  - c/w metoprolol    diabetes  - hgb a1c 4.1    PAD  - was seen by vascular  - S/P RLE angiogram with angioplasty of pop and PT  - ASA, Pletal, Plavix, SQH  - Pain control PRN  - Cleared for discharge from vascular standpoint    bilateral dehisced wounds  - Patient seen and evaluated by podiatry  - Afebrile, leukocytosis, ESR 6, CRP 0.4  -  4/12 s/p bilateral partial first and second ray resection dehisced wounds with well adhered dry eschar, no erythema, no edema, no drainage, no pus,  not acute signs of infection.   - No cultures taken 2/2 to no clinical signs of infection    Medically cleared for discharge home with home care as per Dr. Bravo. 72M with h/o T2DM, CKD, PAD (s/p R & L SFA 04/07/2023 & 04/10/2023 respectively) c/b tarsal wounds that were c/f dry gangrene discharged to complete 6 weeks of ABx (to complete -5/25) sent in by vascular surgeon.    PRIYA on CKD  during last admission, renal sonogram without evidence of hydronephrosis. UA bland. S/p gentle IVFs with improvement of SCr. Held ACEi/ARB at this time. Low serum bicarb- Added sodium bicarb supplement.    HTN  - held ACE/ARB due to PRIYA    diabetes  - hgb a1c 4.1    PAD  - was seen by vascular  - S/P RLE angiogram with angioplasty of pop and PT  - ASA, Pletal, Plavix, SQH  - Pain control PRN  - Cleared for discharge from vascular standpoint    bilateral dehisced wounds  - Patient seen and evaluated by podiatry  - Afebrile, leukocytosis, ESR 6, CRP 0.4  -  4/12 s/p bilateral partial first and second ray resection dehisced wounds with well adhered dry eschar, no erythema, no edema, no drainage, no pus,  not acute signs of infection.   - No cultures taken 2/2 to no clinical signs of infection    Medically cleared for discharge home with home care as per Dr. Bravo.

## 2023-06-03 DIAGNOSIS — N17.9 ACUTE KIDNEY FAILURE, UNSPECIFIED: ICD-10-CM

## 2023-06-03 DIAGNOSIS — E87.5 HYPERKALEMIA: ICD-10-CM

## 2023-06-03 LAB
A1C WITH ESTIMATED AVERAGE GLUCOSE RESULT: 4.1 % — SIGNIFICANT CHANGE UP (ref 4–5.6)
ANION GAP SERPL CALC-SCNC: 14 MMOL/L — SIGNIFICANT CHANGE UP (ref 5–17)
ANION GAP SERPL CALC-SCNC: 9 MMOL/L — SIGNIFICANT CHANGE UP (ref 5–17)
BUN SERPL-MCNC: 54 MG/DL — HIGH (ref 7–23)
BUN SERPL-MCNC: 55 MG/DL — HIGH (ref 7–23)
CALCIUM SERPL-MCNC: 8.7 MG/DL — SIGNIFICANT CHANGE UP (ref 8.4–10.5)
CALCIUM SERPL-MCNC: 9.2 MG/DL — SIGNIFICANT CHANGE UP (ref 8.4–10.5)
CHLORIDE SERPL-SCNC: 112 MMOL/L — HIGH (ref 96–108)
CHLORIDE SERPL-SCNC: 113 MMOL/L — HIGH (ref 96–108)
CO2 SERPL-SCNC: 15 MMOL/L — LOW (ref 22–31)
CO2 SERPL-SCNC: 20 MMOL/L — LOW (ref 22–31)
CREAT SERPL-MCNC: 3 MG/DL — HIGH (ref 0.5–1.3)
CREAT SERPL-MCNC: 3.14 MG/DL — HIGH (ref 0.5–1.3)
EGFR: 20 ML/MIN/1.73M2 — LOW
EGFR: 22 ML/MIN/1.73M2 — LOW
ESTIMATED AVERAGE GLUCOSE: 71 MG/DL — SIGNIFICANT CHANGE UP (ref 68–114)
FOLATE SERPL-MCNC: 9.4 NG/ML — SIGNIFICANT CHANGE UP
GLUCOSE BLDC GLUCOMTR-MCNC: 107 MG/DL — HIGH (ref 70–99)
GLUCOSE BLDC GLUCOMTR-MCNC: 118 MG/DL — HIGH (ref 70–99)
GLUCOSE BLDC GLUCOMTR-MCNC: 185 MG/DL — HIGH (ref 70–99)
GLUCOSE BLDC GLUCOMTR-MCNC: 198 MG/DL — HIGH (ref 70–99)
GLUCOSE SERPL-MCNC: 121 MG/DL — HIGH (ref 70–99)
GLUCOSE SERPL-MCNC: 84 MG/DL — SIGNIFICANT CHANGE UP (ref 70–99)
HCT VFR BLD CALC: 33.5 % — LOW (ref 39–50)
HGB BLD-MCNC: 11.2 G/DL — LOW (ref 13–17)
IRON SATN MFR SERPL: 31 % — SIGNIFICANT CHANGE UP (ref 16–55)
IRON SATN MFR SERPL: 69 UG/DL — SIGNIFICANT CHANGE UP (ref 45–165)
MAGNESIUM SERPL-MCNC: 2 MG/DL — SIGNIFICANT CHANGE UP (ref 1.6–2.6)
MCHC RBC-ENTMCNC: 27.7 PG — SIGNIFICANT CHANGE UP (ref 27–34)
MCHC RBC-ENTMCNC: 33.4 GM/DL — SIGNIFICANT CHANGE UP (ref 32–36)
MCV RBC AUTO: 82.9 FL — SIGNIFICANT CHANGE UP (ref 80–100)
MRSA PCR RESULT.: SIGNIFICANT CHANGE UP
NRBC # BLD: 0 /100 WBCS — SIGNIFICANT CHANGE UP (ref 0–0)
PHOSPHATE SERPL-MCNC: 3.3 MG/DL — SIGNIFICANT CHANGE UP (ref 2.5–4.5)
PLATELET # BLD AUTO: 164 K/UL — SIGNIFICANT CHANGE UP (ref 150–400)
POTASSIUM SERPL-MCNC: 5.2 MMOL/L — SIGNIFICANT CHANGE UP (ref 3.5–5.3)
POTASSIUM SERPL-MCNC: 6.1 MMOL/L — HIGH (ref 3.5–5.3)
POTASSIUM SERPL-SCNC: 5.2 MMOL/L — SIGNIFICANT CHANGE UP (ref 3.5–5.3)
POTASSIUM SERPL-SCNC: 6.1 MMOL/L — HIGH (ref 3.5–5.3)
PROCALCITONIN SERPL-MCNC: 0.08 NG/ML — SIGNIFICANT CHANGE UP (ref 0.02–0.1)
RBC # BLD: 4.04 M/UL — LOW (ref 4.2–5.8)
RBC # FLD: 19.7 % — HIGH (ref 10.3–14.5)
S AUREUS DNA NOSE QL NAA+PROBE: SIGNIFICANT CHANGE UP
SODIUM SERPL-SCNC: 141 MMOL/L — SIGNIFICANT CHANGE UP (ref 135–145)
SODIUM SERPL-SCNC: 142 MMOL/L — SIGNIFICANT CHANGE UP (ref 135–145)
TIBC SERPL-MCNC: 219 UG/DL — LOW (ref 220–430)
TSH SERPL-MCNC: 0.82 UIU/ML — SIGNIFICANT CHANGE UP (ref 0.27–4.2)
UIBC SERPL-MCNC: 150 UG/DL — SIGNIFICANT CHANGE UP (ref 110–370)
VIT B12 SERPL-MCNC: 1261 PG/ML — HIGH (ref 232–1245)
WBC # BLD: 6.78 K/UL — SIGNIFICANT CHANGE UP (ref 3.8–10.5)
WBC # FLD AUTO: 6.78 K/UL — SIGNIFICANT CHANGE UP (ref 3.8–10.5)

## 2023-06-03 PROCEDURE — 99223 1ST HOSP IP/OBS HIGH 75: CPT | Mod: GC

## 2023-06-03 RX ORDER — CHLORHEXIDINE GLUCONATE 213 G/1000ML
1 SOLUTION TOPICAL DAILY
Refills: 0 | Status: DISCONTINUED | OUTPATIENT
Start: 2023-06-03 | End: 2023-06-07

## 2023-06-03 RX ORDER — SODIUM BICARBONATE 1 MEQ/ML
1300 SYRINGE (ML) INTRAVENOUS
Refills: 0 | Status: DISCONTINUED | OUTPATIENT
Start: 2023-06-03 | End: 2023-06-07

## 2023-06-03 RX ORDER — SODIUM CHLORIDE 9 MG/ML
500 INJECTION, SOLUTION INTRAVENOUS
Refills: 0 | Status: DISCONTINUED | OUTPATIENT
Start: 2023-06-03 | End: 2023-06-07

## 2023-06-03 RX ORDER — SODIUM ZIRCONIUM CYCLOSILICATE 10 G/10G
10 POWDER, FOR SUSPENSION ORAL ONCE
Refills: 0 | Status: COMPLETED | OUTPATIENT
Start: 2023-06-03 | End: 2023-06-03

## 2023-06-03 RX ADMIN — HEPARIN SODIUM 5000 UNIT(S): 5000 INJECTION INTRAVENOUS; SUBCUTANEOUS at 12:18

## 2023-06-03 RX ADMIN — Medication 1: at 12:17

## 2023-06-03 RX ADMIN — HEPARIN SODIUM 5000 UNIT(S): 5000 INJECTION INTRAVENOUS; SUBCUTANEOUS at 21:46

## 2023-06-03 RX ADMIN — Medication 50 MILLIGRAM(S): at 05:12

## 2023-06-03 RX ADMIN — CILOSTAZOL 100 MILLIGRAM(S): 100 TABLET ORAL at 05:12

## 2023-06-03 RX ADMIN — SODIUM ZIRCONIUM CYCLOSILICATE 10 GRAM(S): 10 POWDER, FOR SUSPENSION ORAL at 09:29

## 2023-06-03 RX ADMIN — CHLORHEXIDINE GLUCONATE 1 APPLICATION(S): 213 SOLUTION TOPICAL at 12:36

## 2023-06-03 RX ADMIN — CLOPIDOGREL BISULFATE 75 MILLIGRAM(S): 75 TABLET, FILM COATED ORAL at 12:18

## 2023-06-03 RX ADMIN — SODIUM CHLORIDE 125 MILLILITER(S): 9 INJECTION, SOLUTION INTRAVENOUS at 17:42

## 2023-06-03 RX ADMIN — ATORVASTATIN CALCIUM 40 MILLIGRAM(S): 80 TABLET, FILM COATED ORAL at 21:46

## 2023-06-03 RX ADMIN — HEPARIN SODIUM 5000 UNIT(S): 5000 INJECTION INTRAVENOUS; SUBCUTANEOUS at 05:12

## 2023-06-03 RX ADMIN — CILOSTAZOL 100 MILLIGRAM(S): 100 TABLET ORAL at 17:29

## 2023-06-03 RX ADMIN — Medication 325 MILLIGRAM(S): at 12:18

## 2023-06-03 RX ADMIN — Medication 1300 MILLIGRAM(S): at 17:28

## 2023-06-03 NOTE — PHYSICAL THERAPY INITIAL EVALUATION ADULT - WEIGHT-BEARING RESTRICTIONS: STAND/SIT, REHAB EVAL
What Type Of Note Output Would You Prefer (Optional)?: Standard Output Hpi Title: Evaluation of Skin Lesions How Severe Are Your Spot(S)?: mild Have Your Spot(S) Been Treated In The Past?: has not been treated WBAT heel weight bearing in bilateral darco shoes/weight-bearing as tolerated

## 2023-06-03 NOTE — CONSULT NOTE ADULT - SUBJECTIVE AND OBJECTIVE BOX
[facility] DIVISION OF KIDNEY DISEASES AND HYPERTENSION -- [phone]  -- INITIAL CONSULT NOTE  --------------------------------------------------------------------------------  HPI:  71 yo M w/ Hx of DM2, CKD, PAD (s/p tarsal amputations here from vascular office for tarsal wound concerning for gangrene. Nephrology called for PRIYA on CKD    Patient seen at bedside this afternoon. He reports no known history of kidney disease, but patient known to have CKD per previous notes and had an elevated Cr during admission in April, 2023. Patient is a poor historian, unable to confirm his home meds, including lisinopril and HCTZ. Denied any use of NSAIDs, herbal supplements, or illicit substances. He denied any chest pain, shortness of breath, nausea, or vomiting.  Scr on admission was 3.19 now improved to 3 after small bolus of 250 cc.     During admission in April, 2023 seen by house nephrology for PRIYA on CKD. Scr noted to be 2.79 on 4/16/23 and decreased to 2.23 on discharge on 4/18/23      PAST HISTORY  --------------------------------------------------------------------------------  PAST MEDICAL & SURGICAL HISTORY:  Essential hypertension      Hyperlipidemia, unspecified hyperlipidemia type      CAD (coronary artery disease)      PVD (peripheral vascular disease) with claudication      Stented coronary artery      Type 2 diabetes mellitus      Stage 4 chronic kidney disease      COVID-19 vaccination refused      Stented coronary artery      PAD (peripheral artery disease)        FAMILY HISTORY:  Family history of essential hypertension      PAST SOCIAL HISTORY:    ALLERGIES & MEDICATIONS  --------------------------------------------------------------------------------  Allergies    penicillin (Unknown)    Intolerances      Standing Inpatient Medications  aspirin 325 milliGRAM(s) Oral daily  atorvastatin 40 milliGRAM(s) Oral at bedtime  chlorhexidine 2% Cloths 1 Application(s) Topical daily  cilostazol 100 milliGRAM(s) Oral two times a day  clopidogrel Tablet 75 milliGRAM(s) Oral daily  dextrose 5%. 1000 milliLiter(s) IV Continuous <Continuous>  dextrose 5%. 1000 milliLiter(s) IV Continuous <Continuous>  dextrose 50% Injectable 12.5 Gram(s) IV Push once  dextrose 50% Injectable 25 Gram(s) IV Push once  dextrose 50% Injectable 25 Gram(s) IV Push once  glucagon  Injectable 1 milliGRAM(s) IntraMuscular once  heparin   Injectable 5000 Unit(s) SubCutaneous every 8 hours  insulin lispro (ADMELOG) corrective regimen sliding scale   SubCutaneous three times a day before meals  insulin lispro (ADMELOG) corrective regimen sliding scale   SubCutaneous at bedtime  metoprolol succinate ER 50 milliGRAM(s) Oral daily    PRN Inpatient Medications  dextrose Oral Gel 15 Gram(s) Oral once PRN      REVIEW OF SYSTEMS  --------------------------------------------------------------------------------  per HPI    All other systems were reviewed and are negative, except as noted.    VITALS/PHYSICAL EXAM  --------------------------------------------------------------------------------  T(C): 36.8 (06-03-23 @ 12:04), Max: 36.8 (06-03-23 @ 12:04)  HR: 66 (06-03-23 @ 12:04) (66 - 85)  BP: 119/81 (06-03-23 @ 12:04) (119/81 - 162/92)  RR: 18 (06-03-23 @ 12:04) (18 - 18)  SpO2: 100% (06-03-23 @ 12:04) (100% - 100%)  Wt(kg): --  Height (cm): 172.7 (06-02-23 @ 11:31)  Weight (kg): 66.7 (06-02-23 @ 11:31)  BMI (kg/m2): 22.4 (06-02-23 @ 11:31)  BSA (m2): 1.79 (06-02-23 @ 11:31)      06-03-23 @ 07:01  -  06-03-23 @ 16:32  --------------------------------------------------------  IN: 480 mL / OUT: 0 mL / NET: 480 mL      Physical Exam:  	Gen: NAD; tearful  	HEENT: MMM  	Pulm: CTA B/L  	CV: S1S2  	Abd: Soft, +BS   	Ext: No LE edema B/L; b/l lower extremities wrapped  	Neuro: Awake  	Skin: Warm and dry  	Vascular access:    LABS/STUDIES  --------------------------------------------------------------------------------              11.2   6.78  >-----------<  164      [06-03-23 @ 07:17]              33.5     142  |  113  |  55  ----------------------------<  84      [06-03-23 @ 07:07]  6.1   |  15  |  3.00        Ca     9.2     [06-03-23 @ 07:07]      Mg     2.0     [06-03-23 @ 07:07]      Phos  3.3     [06-03-23 @ 07:07]    TPro  7.8  /  Alb  4.2  /  TBili  0.3  /  DBili  x   /  AST  10  /  ALT  10  /  AlkPhos  93  [06-02-23 @ 12:57]    PT/INR: PT 12.8 , INR 1.11       [06-02-23 @ 12:57]  PTT: 27.5       [06-02-23 @ 12:57]      Creatinine Trend:  SCr 3.00 [06-03 @ 07:07]  SCr 3.19 [06-02 @ 21:32]  SCr 3.69 [06-02 @ 12:57]    Urinalysis - [04-13-23 @ 13:02]      Color Light Yellow / Appearance Clear / SG 1.015 / pH 6.0      Gluc Negative / Ketone Negative  / Bili Negative / Urobili <2 mg/dL       Blood Negative / Protein Trace / Leuk Est Negative / Nitrite Negative      RBC  / WBC  / Hyaline  / Gran  / Sq Epi  / Non Sq Epi  / Bacteria       Iron 69, TIBC 219, %sat 31      [06-03-23 @ 07:02]  Ferritin 401      [04-07-23 @ 05:55]  TSH 0.82      [06-03-23 @ 07:07]    HCV 0.16, Nonreact      [04-05-23 @ 07:26]

## 2023-06-03 NOTE — PHYSICAL THERAPY INITIAL EVALUATION ADULT - GENERAL OBSERVATIONS, REHAB EVAL
Pt with recent hospital stay- s/p R SFA stent 4/7, s/p L SFA stent 4/10, s/p b/l partial 1st & 2nd ray resections 4/12. WBAT through heel in darco shoe per chart review of previous admission. Pt now a/w foot wounds, per podiatry consult no infection, staples removed. Pt received semi-supine in bed in NAD, VSS, A&OX2-3, needs cues for safety awareness, PT donned bilateral darco shoes for pt.

## 2023-06-03 NOTE — PHYSICAL THERAPY INITIAL EVALUATION ADULT - NSPTDISCHREC_GEN_A_CORE
PT spoke with pt's wife on 6/3-pt has HHA x4hrs a day, then wife is with pt to assist him the other remaining hours. Pt has rolling walker, shower chair, wheelchair./Home PT

## 2023-06-03 NOTE — PROGRESS NOTE ADULT - ASSESSMENT
72M with h/o T2DM, CKD, PAD (s/p R & L SFA 04/07/2023 & 04/10/2023 respectively) c/b tarsal wounds that were c/f dry gangrene discharged to complete 6 weeks of ABx (to complete -5/25) sent in by vascular surgeon.    PRIYA/CKD  - hold hctz and lisinopril  - monitor cre  - avoid nephrotoxins  - nephrology consult called     HTN  - c/w metoprolol    diabetes  - fs qid  - hgb a1c   - insulin ss    PAD  - c/w asa. Plavix,  statin, pletal  - was seen by vascular    bilateral dehisced wounds  - seen by podiatry  - sutures removed  - no evidence of infection    anemia of chronic disease  - FOBT    dvt px  - sq heparin

## 2023-06-03 NOTE — PHYSICAL THERAPY INITIAL EVALUATION ADULT - ADDITIONAL COMMENTS
PTA per wife-pt was d/c home in april with rolling walker, was ambulating with rolling walker. Pt also owns wheelchair & shower chair. Pt has HHA 4hrs/day (assist with showering), the other remaining hours of the day pt's wife is with him to assist, he is not left alone per wife.

## 2023-06-03 NOTE — PROGRESS NOTE ADULT - ASSESSMENT
72M with PMH of CKD4, PAD s/p stent, CAD, HTN, T2DM, smoker s/p R SFA stent (4/7), and L SFA stent (4/10), s/p b/l partial 1st and 2nd ray resection (4/12). Vascular surgery evaluation in context of PAD.    Recommendations:  - Planing for angiogram, OR timing pending  - Will need medical and cardiological optimizations/risk stratifications prior to OR   - Appreciate nephro recommendations for planned angiogram  - Recommend continue PAD medications, pletal, ASA, plavix, statin       Vascular Surgery  [id number]

## 2023-06-03 NOTE — CONSULT NOTE ADULT - ATTENDING COMMENTS
pt with bilateral CLTI  CKD  plan  right leg angio
Rest as per Dr. Albino Terrazas MD  O: [phone]  Contact me on teams

## 2023-06-03 NOTE — CONSULT NOTE ADULT - PROBLEM SELECTOR RECOMMENDATION 2
Pt with hyperkalemia overnight and this AM. Has received lokelma overnight and this morning. Please repeat potassium this evening. monitor potassium.     If you have any questions, please feel free to contact me  Lorne Posada  Nephrology Fellow  [phone]; Prefer [employer] TEAMS  (After 5pm or on weekends please page the on-call fellow). Pt with hyperkalemia overnight and this AM. Has received lokelma overnight and this morning. Please repeat potassium this evening. monitor potassium. Change diet to low K     If you have any questions, please feel free to contact me  Lorne Posada  Nephrology Fellow  [phone]; Prefer [employer] TEAMS  (After 5pm or on weekends please page the on-call fellow).

## 2023-06-03 NOTE — CONSULT NOTE ADULT - PROBLEM SELECTOR RECOMMENDATION 9
Pt. with PRIYA on CKD. Documented in chart that he has CKD in previous notes. No labs except during last admission on file. During last admission, Scr noted to be 2.2-2.7, discharged on 2.23 on 4/18/23. During last admission, UA without evidence of significant hematuria or proteinuria. Renal sonogram without evidence of hydronephrosis.Please obtain a PVR bladder scan to r/u retention, place carranza if retaining. Obtain UA and repeat BMP at 5pm. Give 500 cc of IVF LR at over 4 hours.  Agree with holding ACE/ARB at this time. Monitor for urinary retention. Monitor labs and urine output. Dose medications as per eGFR.    Low serum bicarb- Add sodium bicarb 1300 BID. monitor serum bicarb Pt. with PRIYA on CKD. Documented in chart that he has CKD in previous notes. No labs except during last admission on file. During last admission, Scr noted to be 2.2-2.7, discharged on 2.23 on 4/18/23. During last admission, UA without evidence of significant hematuria or proteinuria. Renal sonogram without evidence of hydronephrosis.Please obtain a PVR bladder scan to r/u retention, place carranza if retaining. Obtain UA and repeat BMP at 5pm. Give 500 cc of IVF LR at over 4 hours.  Agree with holding ACE/ARB at this time. Monitor for urinary retention. Monitor labs and urine output. Dose medications as per eGFR. Would hold angiogram till creatinine back to baseline    Low serum bicarb- Add sodium bicarb 1300 BID. monitor serum bicarb

## 2023-06-04 LAB
ANION GAP SERPL CALC-SCNC: 11 MMOL/L — SIGNIFICANT CHANGE UP (ref 5–17)
APPEARANCE UR: CLEAR — SIGNIFICANT CHANGE UP
BILIRUB UR-MCNC: NEGATIVE — SIGNIFICANT CHANGE UP
BUN SERPL-MCNC: 52 MG/DL — HIGH (ref 7–23)
CALCIUM SERPL-MCNC: 8.9 MG/DL — SIGNIFICANT CHANGE UP (ref 8.4–10.5)
CHLORIDE SERPL-SCNC: 113 MMOL/L — HIGH (ref 96–108)
CO2 SERPL-SCNC: 18 MMOL/L — LOW (ref 22–31)
COLOR SPEC: COLORLESS — SIGNIFICANT CHANGE UP
CREAT SERPL-MCNC: 2.85 MG/DL — HIGH (ref 0.5–1.3)
DIFF PNL FLD: NEGATIVE — SIGNIFICANT CHANGE UP
EGFR: 23 ML/MIN/1.73M2 — LOW
GLUCOSE BLDC GLUCOMTR-MCNC: 104 MG/DL — HIGH (ref 70–99)
GLUCOSE BLDC GLUCOMTR-MCNC: 117 MG/DL — HIGH (ref 70–99)
GLUCOSE BLDC GLUCOMTR-MCNC: 162 MG/DL — HIGH (ref 70–99)
GLUCOSE BLDC GLUCOMTR-MCNC: 196 MG/DL — HIGH (ref 70–99)
GLUCOSE BLDC GLUCOMTR-MCNC: 82 MG/DL — SIGNIFICANT CHANGE UP (ref 70–99)
GLUCOSE BLDC GLUCOMTR-MCNC: 88 MG/DL — SIGNIFICANT CHANGE UP (ref 70–99)
GLUCOSE SERPL-MCNC: 112 MG/DL — HIGH (ref 70–99)
GLUCOSE UR QL: NEGATIVE — SIGNIFICANT CHANGE UP
HCT VFR BLD CALC: 32.8 % — LOW (ref 39–50)
HGB BLD-MCNC: 10.8 G/DL — LOW (ref 13–17)
KETONES UR-MCNC: NEGATIVE — SIGNIFICANT CHANGE UP
LEUKOCYTE ESTERASE UR-ACNC: NEGATIVE — SIGNIFICANT CHANGE UP
MCHC RBC-ENTMCNC: 27.7 PG — SIGNIFICANT CHANGE UP (ref 27–34)
MCHC RBC-ENTMCNC: 32.9 GM/DL — SIGNIFICANT CHANGE UP (ref 32–36)
MCV RBC AUTO: 84.1 FL — SIGNIFICANT CHANGE UP (ref 80–100)
NITRITE UR-MCNC: NEGATIVE — SIGNIFICANT CHANGE UP
NRBC # BLD: 0 /100 WBCS — SIGNIFICANT CHANGE UP (ref 0–0)
PH UR: 6 — SIGNIFICANT CHANGE UP (ref 5–8)
PLATELET # BLD AUTO: 170 K/UL — SIGNIFICANT CHANGE UP (ref 150–400)
POTASSIUM SERPL-MCNC: 5 MMOL/L — SIGNIFICANT CHANGE UP (ref 3.5–5.3)
POTASSIUM SERPL-SCNC: 5 MMOL/L — SIGNIFICANT CHANGE UP (ref 3.5–5.3)
PROT UR-MCNC: NEGATIVE — SIGNIFICANT CHANGE UP
RBC # BLD: 3.9 M/UL — LOW (ref 4.2–5.8)
RBC # FLD: 18.8 % — HIGH (ref 10.3–14.5)
SODIUM SERPL-SCNC: 142 MMOL/L — SIGNIFICANT CHANGE UP (ref 135–145)
SP GR SPEC: 1.01 — SIGNIFICANT CHANGE UP (ref 1.01–1.02)
UROBILINOGEN FLD QL: NEGATIVE — SIGNIFICANT CHANGE UP
WBC # BLD: 6.03 K/UL — SIGNIFICANT CHANGE UP (ref 3.8–10.5)
WBC # FLD AUTO: 6.03 K/UL — SIGNIFICANT CHANGE UP (ref 3.8–10.5)

## 2023-06-04 RX ADMIN — Medication 1300 MILLIGRAM(S): at 17:33

## 2023-06-04 RX ADMIN — HEPARIN SODIUM 5000 UNIT(S): 5000 INJECTION INTRAVENOUS; SUBCUTANEOUS at 13:23

## 2023-06-04 RX ADMIN — CILOSTAZOL 100 MILLIGRAM(S): 100 TABLET ORAL at 05:44

## 2023-06-04 RX ADMIN — HEPARIN SODIUM 5000 UNIT(S): 5000 INJECTION INTRAVENOUS; SUBCUTANEOUS at 05:35

## 2023-06-04 RX ADMIN — CLOPIDOGREL BISULFATE 75 MILLIGRAM(S): 75 TABLET, FILM COATED ORAL at 11:58

## 2023-06-04 RX ADMIN — SODIUM CHLORIDE 125 MILLILITER(S): 9 INJECTION, SOLUTION INTRAVENOUS at 20:44

## 2023-06-04 RX ADMIN — Medication 50 MILLIGRAM(S): at 05:43

## 2023-06-04 RX ADMIN — CHLORHEXIDINE GLUCONATE 1 APPLICATION(S): 213 SOLUTION TOPICAL at 11:59

## 2023-06-04 RX ADMIN — ATORVASTATIN CALCIUM 40 MILLIGRAM(S): 80 TABLET, FILM COATED ORAL at 21:27

## 2023-06-04 RX ADMIN — HEPARIN SODIUM 5000 UNIT(S): 5000 INJECTION INTRAVENOUS; SUBCUTANEOUS at 21:27

## 2023-06-04 RX ADMIN — Medication 1: at 11:58

## 2023-06-04 RX ADMIN — SODIUM CHLORIDE 125 MILLILITER(S): 9 INJECTION, SOLUTION INTRAVENOUS at 10:47

## 2023-06-04 RX ADMIN — CILOSTAZOL 100 MILLIGRAM(S): 100 TABLET ORAL at 17:34

## 2023-06-04 RX ADMIN — Medication 325 MILLIGRAM(S): at 11:58

## 2023-06-04 RX ADMIN — Medication 1300 MILLIGRAM(S): at 05:35

## 2023-06-04 NOTE — PROGRESS NOTE ADULT - ASSESSMENT
72M with h/o T2DM, CKD, PAD (s/p R & L SFA 04/07/2023 & 04/10/2023 respectively) c/b tarsal wounds that were c/f dry gangrene discharged to complete 6 weeks of ABx (to complete -5/25) sent in by vascular surgeon.    Acute kidney injury superimposed on CKD.   ·  Recommendation: Pt. with PRIYA on CKD. Documented in chart that he has CKD in previous notes. No labs except during last admission on file. During last admission, Scr noted to be 2.2-2.7, discharged on 2.23 on 4/18/23. During last admission, UA without evidence of significant hematuria or proteinuria. Renal sonogram without evidence of hydronephrosis.Please obtain a PVR bladder scan to r/u retention, place carranza if retaining. Obtain UA and repeat BMP at 5pm. Give 500 cc of IVF LR at over 4 hours.  Agree with holding ACE/ARB at this time. Monitor for urinary retention. Monitor labs and urine output. Dose medications as per eGFR. Would hold angiogram till creatinine back to baseline    Low serum bicarb- Add sodium bicarb 1300 BID. monitor serum bicarb.  - nephrology consult appreciated    HTN  - c/w metoprolol    diabetes  - fs qid  - hgb a1c   - insulin ss    PAD  - c/w asa. Plavix,  statin, pletal  - was seen by vascular  - Planing for angiogram, OR timing pending    bilateral dehisced wounds  - seen by podiatry  - sutures removed  - no evidence of infection    anemia of chronic disease  - FOBT    dvt px  - sq heparin

## 2023-06-05 LAB
ANION GAP SERPL CALC-SCNC: 12 MMOL/L — SIGNIFICANT CHANGE UP (ref 5–17)
BUN SERPL-MCNC: 49 MG/DL — HIGH (ref 7–23)
CALCIUM SERPL-MCNC: 8.7 MG/DL — SIGNIFICANT CHANGE UP (ref 8.4–10.5)
CHLORIDE SERPL-SCNC: 114 MMOL/L — HIGH (ref 96–108)
CO2 SERPL-SCNC: 19 MMOL/L — LOW (ref 22–31)
CREAT SERPL-MCNC: 2.89 MG/DL — HIGH (ref 0.5–1.3)
EGFR: 23 ML/MIN/1.73M2 — LOW
GLUCOSE BLDC GLUCOMTR-MCNC: 101 MG/DL — HIGH (ref 70–99)
GLUCOSE BLDC GLUCOMTR-MCNC: 113 MG/DL — HIGH (ref 70–99)
GLUCOSE BLDC GLUCOMTR-MCNC: 182 MG/DL — HIGH (ref 70–99)
GLUCOSE BLDC GLUCOMTR-MCNC: 194 MG/DL — HIGH (ref 70–99)
GLUCOSE SERPL-MCNC: 103 MG/DL — HIGH (ref 70–99)
POTASSIUM SERPL-MCNC: 4.6 MMOL/L — SIGNIFICANT CHANGE UP (ref 3.5–5.3)
POTASSIUM SERPL-SCNC: 4.6 MMOL/L — SIGNIFICANT CHANGE UP (ref 3.5–5.3)
SODIUM SERPL-SCNC: 145 MMOL/L — SIGNIFICANT CHANGE UP (ref 135–145)

## 2023-06-05 PROCEDURE — 99232 SBSQ HOSP IP/OBS MODERATE 35: CPT | Mod: GC

## 2023-06-05 RX ADMIN — SODIUM CHLORIDE 125 MILLILITER(S): 9 INJECTION, SOLUTION INTRAVENOUS at 05:04

## 2023-06-05 RX ADMIN — Medication 325 MILLIGRAM(S): at 12:00

## 2023-06-05 RX ADMIN — CLOPIDOGREL BISULFATE 75 MILLIGRAM(S): 75 TABLET, FILM COATED ORAL at 12:00

## 2023-06-05 RX ADMIN — Medication 1300 MILLIGRAM(S): at 17:20

## 2023-06-05 RX ADMIN — HEPARIN SODIUM 5000 UNIT(S): 5000 INJECTION INTRAVENOUS; SUBCUTANEOUS at 05:05

## 2023-06-05 RX ADMIN — CHLORHEXIDINE GLUCONATE 1 APPLICATION(S): 213 SOLUTION TOPICAL at 12:00

## 2023-06-05 RX ADMIN — Medication 50 MILLIGRAM(S): at 05:05

## 2023-06-05 RX ADMIN — Medication 1: at 12:00

## 2023-06-05 RX ADMIN — CILOSTAZOL 100 MILLIGRAM(S): 100 TABLET ORAL at 17:21

## 2023-06-05 RX ADMIN — CILOSTAZOL 100 MILLIGRAM(S): 100 TABLET ORAL at 05:05

## 2023-06-05 RX ADMIN — SODIUM CHLORIDE 125 MILLILITER(S): 9 INJECTION, SOLUTION INTRAVENOUS at 13:48

## 2023-06-05 RX ADMIN — Medication 1300 MILLIGRAM(S): at 05:05

## 2023-06-05 RX ADMIN — HEPARIN SODIUM 5000 UNIT(S): 5000 INJECTION INTRAVENOUS; SUBCUTANEOUS at 13:46

## 2023-06-05 RX ADMIN — HEPARIN SODIUM 5000 UNIT(S): 5000 INJECTION INTRAVENOUS; SUBCUTANEOUS at 21:57

## 2023-06-05 RX ADMIN — ATORVASTATIN CALCIUM 40 MILLIGRAM(S): 80 TABLET, FILM COATED ORAL at 21:57

## 2023-06-05 NOTE — CONSULT NOTE ADULT - SUBJECTIVE AND OBJECTIVE BOX
DATE OF SERVICE: 06-05-23 @ 22:54    CHIEF COMPLAINT:Patient is a 72y old  Male who presents with a chief complaint of PAD    HISTORY OF PRESENT ILLNESS:HPI:  72M with h/o T2DM, CKD, PAD (s/p R & L SFA 04/07/2023 & 04/10/2023 respectively) c/b tarsal wounds that were c/f dry gangrene discharged to complete 6 weeks of ABx (to complete -5/25) sent in by vascular surgeon. (02 Jun 2023 17:08)      PAST MEDICAL & SURGICAL HISTORY:  Essential hypertension      Hyperlipidemia, unspecified hyperlipidemia type      CAD (coronary artery disease)      PVD (peripheral vascular disease) with claudication      Stented coronary artery      Type 2 diabetes mellitus      Stage 4 chronic kidney disease      COVID-19 vaccination refused      Stented coronary artery      PAD (peripheral artery disease)              MEDICATIONS:  cilostazol 100 milliGRAM(s) Oral two times a day  clopidogrel Tablet 75 milliGRAM(s) Oral daily  heparin   Injectable 5000 Unit(s) SubCutaneous every 8 hours  metoprolol succinate ER 50 milliGRAM(s) Oral daily        aspirin 325 milliGRAM(s) Oral daily      atorvastatin 40 milliGRAM(s) Oral at bedtime  dextrose 50% Injectable 12.5 Gram(s) IV Push once  dextrose 50% Injectable 25 Gram(s) IV Push once  dextrose 50% Injectable 25 Gram(s) IV Push once  dextrose Oral Gel 15 Gram(s) Oral once PRN  glucagon  Injectable 1 milliGRAM(s) IntraMuscular once  insulin lispro (ADMELOG) corrective regimen sliding scale   SubCutaneous at bedtime  insulin lispro (ADMELOG) corrective regimen sliding scale   SubCutaneous three times a day before meals    chlorhexidine 2% Cloths 1 Application(s) Topical daily  dextrose 5%. 1000 milliLiter(s) IV Continuous <Continuous>  dextrose 5%. 1000 milliLiter(s) IV Continuous <Continuous>  lactated ringers. 500 milliLiter(s) IV Continuous <Continuous>  sodium bicarbonate 1300 milliGRAM(s) Oral two times a day      FAMILY HISTORY:  Family history of essential hypertension        Non-contributory    SOCIAL HISTORY:    [ ] not a smoker    Allergies    penicillin (Unknown)    Intolerances    	    REVIEW OF SYSTEMS:  CONSTITUTIONAL: No fever  EYES: No eye pain, visual disturbances, or discharge  ENMT:  No difficulty hearing, tinnitus  NECK: No pain or stiffness  RESPIRATORY: No cough, wheezing,  CARDIOVASCULAR: No chest pain, palpitations, passing out, dizziness, or leg swelling  GASTROINTESTINAL:  No nausea, vomiting, diarrhea or constipation. No melena.  GENITOURINARY: No dysuria, hematuria  NEUROLOGICAL: No stroke like symptoms  SKIN: No burning or lesions   ENDOCRINE: No heat or cold intolerance  MUSCULOSKELETAL: No joint pain or swelling  PSYCHIATRIC: No  anxiety, mood swings  HEME/LYMPH: No bleeding gums  ALLERGY AND IMMUNOLOGIC: No hives or eczema	    All other ROS negative    PHYSICAL EXAM:  T(C): 36.6 (06-05-23 @ 20:25), Max: 36.7 (06-05-23 @ 04:30)  HR: 104 (06-05-23 @ 20:25) (72 - 104)  BP: 153/78 (06-05-23 @ 20:25) (142/79 - 178/91)  RR: 18 (06-05-23 @ 20:25) (18 - 18)  SpO2: 98% (06-05-23 @ 20:25) (98% - 100%)  Wt(kg): --  I&O's Summary    04 Jun 2023 07:01  -  05 Jun 2023 07:00  --------------------------------------------------------  IN: 1980 mL / OUT: 2200 mL / NET: -220 mL    05 Jun 2023 07:01  -  05 Jun 2023 22:54  --------------------------------------------------------  IN: 870 mL / OUT: 860 mL / NET: 10 mL        Appearance: Normal	  HEENT:   Normal oral mucosa, EOMI	  Cardiovascular:  S1 S2, No JVD,    Respiratory: Lungs clear to auscultation	  Psychiatry: Alert  Gastrointestinal:  Soft, Non-tender, + BS	  Skin: No rashes   Neurologic: Non-focal  Extremities:  No edema  Vascular: Peripheral pulses palpable    	    	  	  CARDIAC MARKERS:  Labs personally reviewed by me                                  10.8   6.03  )-----------( 170      ( 04 Jun 2023 06:25 )             32.8     06-05    145  |  114<H>  |  49<H>  ----------------------------<  103<H>  4.6   |  19<L>  |  2.89<H>    Ca    8.7      05 Jun 2023 06:39            EKG: Personally reviewed by me - SB 1st degree AVB  Radiology: Personally reviewed by me - CXR 4/13 clear lungs    < from: TTE W or WO Ultrasound Enhancing Agent (04.06.23 @ 06:22) >  CONCLUSIONS:     1. Small left ventricular cavity size. The left ventricular wall thickness is mildly increased. The left ventricular systolic function is normal with an ejection fraction of 63 % by Padilla's method of disks. There are no regional wall motion abnormalities seen.   2. There is normal left ventricular diastolic function.   3. Global longitudinal strain is -25.4 % (normal < -18%). GLS was assessed on [employer] echo machine with a heart rate of 78 bpm and a blood pressure of 126/73 mmHg.   4. Normal right ventricular cavity size, normal wall thickness and normal systolic function. The tricuspid annular plane systolic excursion (TAPSE) is 2.1 cm (normal >=1.7 cm).   5. Fibrocalcific aortic valve sclerosis without stenosis.   6. Trace aortic regurgitation   7. .There is mitral valve thickening of the anterior and posterior leaflets. There is trace mitral regurgitation.   8. No prior echocardiogram is available for comparison.    < end of copied text >          ASSESSMENT/PLAN: 	  71 y/o M--patient with a history of type 2 DM complicated with CKD4, PAD with unclear past stenting hx in the RLE, essential HTN tobacco use,  transferred from [facility] 3/30/23 with spouse reporting the patient had lost his R hallux toenail about a week prior to that admission and noted dark discoloration of the R hallux and 2nd toe with foul odor emanating from the R foot    1. Pre-op risk stratification pods surgery   - S/p peripheral angio 4/7/2023 revealing  of SFA bilaterally s/p DCBA and stenting of R SFA.   - Pt displays no evidence of coronary ischemia  - TTE shows normal LV systolic function with no significant structural abnormalities.   - From a cardiac standpoint, Pt is optimized for pods intervention  - Pt is intermediate to moderate risk for desired procedure    2. PAD  - Continue Cilostazol, and DAPT therapy  - Statin therapy  - Vascular input appreciated    3. CKD  - Renal input appreciated    4. CAD  - S/p PCI   -Continue Plavix, statin and BB          Differential diagnosis and plan of care discussed with patient after the evaluation. Counseling on diet, nutritional counseling, weight management, exercise and medication compliance was done.   Advanced care planning/advanced directives discussed with patient/family. DNR status including forceful chest compressions to attempt to restart the heart, ventilator support/artificial breathing, electric shock, artificial nutrition, health care proxy, Molst form all discussed with pt. Pt wishes to consider. More than fifteen minutes spent on discussing advanced directives.            Cedric Liu DO [facility]  Cardiovascular Medicine  [address] [place] [address], [address]  Office [phone]  Available via call/text on [employer] Teams DATE OF SERVICE: 06-05-23 @ 22:54    CHIEF COMPLAINT:Patient is a 72y old  Male who presents with a chief complaint of PAD    HISTORY OF PRESENT ILLNESS:HPI:  72M with h/o T2DM, CKD, PAD (s/p R & L SFA 04/07/2023 & 04/10/2023 respectively) c/b tarsal wounds that were c/f dry gangrene discharged to complete 6 weeks of ABx (to complete -5/25) sent in by vascular surgeon. (02 Jun 2023 17:08)      PAST MEDICAL & SURGICAL HISTORY:  Essential hypertension      Hyperlipidemia, unspecified hyperlipidemia type      CAD (coronary artery disease)      PVD (peripheral vascular disease) with claudication      Stented coronary artery      Type 2 diabetes mellitus      Stage 4 chronic kidney disease      COVID-19 vaccination refused      Stented coronary artery      PAD (peripheral artery disease)              MEDICATIONS:  cilostazol 100 milliGRAM(s) Oral two times a day  clopidogrel Tablet 75 milliGRAM(s) Oral daily  heparin   Injectable 5000 Unit(s) SubCutaneous every 8 hours  metoprolol succinate ER 50 milliGRAM(s) Oral daily        aspirin 325 milliGRAM(s) Oral daily      atorvastatin 40 milliGRAM(s) Oral at bedtime  dextrose 50% Injectable 12.5 Gram(s) IV Push once  dextrose 50% Injectable 25 Gram(s) IV Push once  dextrose 50% Injectable 25 Gram(s) IV Push once  dextrose Oral Gel 15 Gram(s) Oral once PRN  glucagon  Injectable 1 milliGRAM(s) IntraMuscular once  insulin lispro (ADMELOG) corrective regimen sliding scale   SubCutaneous at bedtime  insulin lispro (ADMELOG) corrective regimen sliding scale   SubCutaneous three times a day before meals    chlorhexidine 2% Cloths 1 Application(s) Topical daily  dextrose 5%. 1000 milliLiter(s) IV Continuous <Continuous>  dextrose 5%. 1000 milliLiter(s) IV Continuous <Continuous>  lactated ringers. 500 milliLiter(s) IV Continuous <Continuous>  sodium bicarbonate 1300 milliGRAM(s) Oral two times a day      FAMILY HISTORY:  Family history of essential hypertension        Non-contributory    SOCIAL HISTORY:    [ ] not a smoker    Allergies    penicillin (Unknown)    Intolerances    	    REVIEW OF SYSTEMS:  CONSTITUTIONAL: No fever  EYES: No eye pain, visual disturbances, or discharge  ENMT:  No difficulty hearing, tinnitus  NECK: No pain or stiffness  RESPIRATORY: No cough, wheezing,  CARDIOVASCULAR: No chest pain, palpitations, passing out, dizziness, or leg swelling  GASTROINTESTINAL:  No nausea, vomiting, diarrhea or constipation. No melena.  GENITOURINARY: No dysuria, hematuria  NEUROLOGICAL: No stroke like symptoms  SKIN: No burning or lesions   ENDOCRINE: No heat or cold intolerance  MUSCULOSKELETAL: No joint pain or swelling  PSYCHIATRIC: No  anxiety, mood swings  HEME/LYMPH: No bleeding gums  ALLERGY AND IMMUNOLOGIC: No hives or eczema	    All other ROS negative    PHYSICAL EXAM:  T(C): 36.6 (06-05-23 @ 20:25), Max: 36.7 (06-05-23 @ 04:30)  HR: 104 (06-05-23 @ 20:25) (72 - 104)  BP: 153/78 (06-05-23 @ 20:25) (142/79 - 178/91)  RR: 18 (06-05-23 @ 20:25) (18 - 18)  SpO2: 98% (06-05-23 @ 20:25) (98% - 100%)  Wt(kg): --  I&O's Summary    04 Jun 2023 07:01  -  05 Jun 2023 07:00  --------------------------------------------------------  IN: 1980 mL / OUT: 2200 mL / NET: -220 mL    05 Jun 2023 07:01  -  05 Jun 2023 22:54  --------------------------------------------------------  IN: 870 mL / OUT: 860 mL / NET: 10 mL        Appearance: Normal	  HEENT:   Normal oral mucosa, EOMI	  Cardiovascular:  S1 S2, No JVD,    Respiratory: Lungs clear to auscultation	  Psychiatry: Alert  Gastrointestinal:  Soft, Non-tender, + BS	  Skin: No rashes   Neurologic: Non-focal  Extremities:  No edema  Vascular: Peripheral pulses palpable    	    	  	  CARDIAC MARKERS:  Labs personally reviewed by me                                  10.8   6.03  )-----------( 170      ( 04 Jun 2023 06:25 )             32.8     06-05    145  |  114<H>  |  49<H>  ----------------------------<  103<H>  4.6   |  19<L>  |  2.89<H>    Ca    8.7      05 Jun 2023 06:39            EKG: Personally reviewed by me - SB 1st degree AVB  Radiology: Personally reviewed by me - CXR 4/13 clear lungs    < from: TTE W or WO Ultrasound Enhancing Agent (04.06.23 @ 06:22) >  CONCLUSIONS:     1. Small left ventricular cavity size. The left ventricular wall thickness is mildly increased. The left ventricular systolic function is normal with an ejection fraction of 63 % by Padilla's method of disks. There are no regional wall motion abnormalities seen.   2. There is normal left ventricular diastolic function.   3. Global longitudinal strain is -25.4 % (normal < -18%). GLS was assessed on [employer] echo machine with a heart rate of 78 bpm and a blood pressure of 126/73 mmHg.   4. Normal right ventricular cavity size, normal wall thickness and normal systolic function. The tricuspid annular plane systolic excursion (TAPSE) is 2.1 cm (normal >=1.7 cm).   5. Fibrocalcific aortic valve sclerosis without stenosis.   6. Trace aortic regurgitation   7. .There is mitral valve thickening of the anterior and posterior leaflets. There is trace mitral regurgitation.   8. No prior echocardiogram is available for comparison.    < end of copied text >          ASSESSMENT/PLAN: 	  73 y/o M--patient with a history of type 2 DM complicated with CKD4, PAD with unclear past stenting hx in the RLE, essential HTN tobacco use,  transferred from [facility] 3/30/23 with spouse reporting the patient had lost his R hallux toenail about a week prior to that admission and noted dark discoloration of the R hallux and 2nd toe with foul odor emanating from the R foot    1. Pre-op risk stratification pods surgery   - S/p peripheral angio 4/7/2023 revealing  of SFA bilaterally s/p DCBA and stenting of R SFA.   - Pt displays no evidence of coronary ischemia  - TTE shows normal LV systolic function with no significant structural abnormalities.   - From a cardiac standpoint, Pt is optimized for pods intervention or LE angiogram  - Pt is intermediate to low- moderate risk for desired procedure    2. PAD  - Continue Cilostazol, and DAPT therapy  - Statin therapy  - Vascular input appreciated    3. CKD  - Renal input appreciated    4. CAD  - S/p PCI   -Continue Plavix, statin and BB          Differential diagnosis and plan of care discussed with patient after the evaluation. Counseling on diet, nutritional counseling, weight management, exercise and medication compliance was done.   Advanced care planning/advanced directives discussed with patient/family. DNR status including forceful chest compressions to attempt to restart the heart, ventilator support/artificial breathing, electric shock, artificial nutrition, health care proxy, Molst form all discussed with pt. Pt wishes to consider. More than fifteen minutes spent on discussing advanced directives.            Cedric Liu DO [facility]  Cardiovascular Medicine  [address] [place] [address], [address]  Office [phone]  Available via call/text on [employer] Teams

## 2023-06-05 NOTE — PROGRESS NOTE ADULT - ASSESSMENT
72M with h/o T2DM, CKD, PAD (s/p R & L SFA 04/07/2023 & 04/10/2023 respectively) c/b tarsal wounds that were c/f dry gangrene discharged to complete 6 weeks of ABx (to complete -5/25) sent in by vascular surgeon.    Acute kidney injury superimposed on CKD.   ·  Recommendation: Pt. with PRIYA on CKD. Documented in chart that he has CKD in previous notes. No labs except during last admission on file. During last admission, Scr noted to be 2.2-2.7, discharged on 2.23 on 4/18/23. During last admission, UA without evidence of significant hematuria or proteinuria. Renal sonogram without evidence of hydronephrosis.Please obtain a PVR bladder scan to r/u retention, place carranza if retaining. Obtain UA and repeat BMP at 5pm. Give 500 cc of IVF LR at over 4 hours.  Agree with holding ACE/ARB at this time. Monitor for urinary retention. Monitor labs and urine output. Dose medications as per eGFR. Would hold angiogram till creatinine back to baseline    Low serum bicarb- Add sodium bicarb 1300 BID. monitor serum bicarb.  - nephrology consult appreciated    HTN  - c/w metoprolol    diabetes  - fs qid  - hgb a1c   - insulin ss    PAD  - c/w asa. Plavix,  statin, pletal  - was seen by vascular  - Planing for angiogram, OR timing pending  - cleared by nephrology    bilateral dehisced wounds  - seen by podiatry  - sutures removed  - no evidence of infection    anemia of chronic disease  - FOBT    dvt px  - sq heparin

## 2023-06-05 NOTE — PROGRESS NOTE ADULT - ASSESSMENT
72 M with bilateral dehisced wounds  - Patient seen and evaluated  - Afebrile, WBC 6.98, ESR 6, CRP 0.4  -  4/12 s/p bilateral partial first and second ray resection dehisced wounds with well adhered dry eschar, sutures intact, no erythema, no edema, no drainage, no pus,  not acute signs of infection.   - No cultures taken 2/2 to no clinical signs of infection  - vasc planning possible angio, appreciated  - Patient stable for discharge from a podiatry standpoint, No acute podiatry intervention planned at this time  - Seen with attending   72 M with bilateral dehisced wounds  - Patient seen and evaluated  - Afebrile, WBC 6.98, ESR 6, CRP 0.4  -  4/12 s/p bilateral partial first and second ray resection dehisced wounds with well adhered dry eschar, sutures intact, no erythema, no edema, no drainage, no pus,  not acute signs of infection.   - using sterile suture removal kit, right foot wound was debrided down to subQ and not beyond.  - No cultures taken 2/2 to no clinical signs of infection  - vasc planning possible angio, appreciated  - pod plan local wound care versus right foot wound debridement and [facility]  - please document medical optimziation for surgical intervention under anesthesia  - Seen with attending

## 2023-06-06 ENCOUNTER — TRANSCRIPTION ENCOUNTER (OUTPATIENT)
Age: 73
End: 2023-06-06

## 2023-06-06 LAB
ANION GAP SERPL CALC-SCNC: 12 MMOL/L — SIGNIFICANT CHANGE UP (ref 5–17)
APTT BLD: 30.8 SEC — SIGNIFICANT CHANGE UP (ref 27.5–35.5)
BLD GP AB SCN SERPL QL: NEGATIVE — SIGNIFICANT CHANGE UP
BUN SERPL-MCNC: 39 MG/DL — HIGH (ref 7–23)
CALCIUM SERPL-MCNC: 8.7 MG/DL — SIGNIFICANT CHANGE UP (ref 8.4–10.5)
CHLORIDE SERPL-SCNC: 113 MMOL/L — HIGH (ref 96–108)
CO2 SERPL-SCNC: 19 MMOL/L — LOW (ref 22–31)
CREAT SERPL-MCNC: 2.38 MG/DL — HIGH (ref 0.5–1.3)
EGFR: 28 ML/MIN/1.73M2 — LOW
GLUCOSE BLDC GLUCOMTR-MCNC: 102 MG/DL — HIGH (ref 70–99)
GLUCOSE BLDC GLUCOMTR-MCNC: 125 MG/DL — HIGH (ref 70–99)
GLUCOSE BLDC GLUCOMTR-MCNC: 167 MG/DL — HIGH (ref 70–99)
GLUCOSE BLDC GLUCOMTR-MCNC: 178 MG/DL — HIGH (ref 70–99)
GLUCOSE BLDC GLUCOMTR-MCNC: 195 MG/DL — HIGH (ref 70–99)
GLUCOSE SERPL-MCNC: 160 MG/DL — HIGH (ref 70–99)
HCT VFR BLD CALC: 28.4 % — LOW (ref 39–50)
HGB BLD-MCNC: 9.6 G/DL — LOW (ref 13–17)
INR BLD: 1.09 RATIO — SIGNIFICANT CHANGE UP (ref 0.88–1.16)
MAGNESIUM SERPL-MCNC: 1.3 MG/DL — LOW (ref 1.6–2.6)
MCHC RBC-ENTMCNC: 27.7 PG — SIGNIFICANT CHANGE UP (ref 27–34)
MCHC RBC-ENTMCNC: 33.8 GM/DL — SIGNIFICANT CHANGE UP (ref 32–36)
MCV RBC AUTO: 81.8 FL — SIGNIFICANT CHANGE UP (ref 80–100)
NRBC # BLD: 0 /100 WBCS — SIGNIFICANT CHANGE UP (ref 0–0)
OB PNL STL: NEGATIVE — SIGNIFICANT CHANGE UP
PHOSPHATE SERPL-MCNC: 3.1 MG/DL — SIGNIFICANT CHANGE UP (ref 2.5–4.5)
PLATELET # BLD AUTO: 138 K/UL — LOW (ref 150–400)
POTASSIUM SERPL-MCNC: 4.6 MMOL/L — SIGNIFICANT CHANGE UP (ref 3.5–5.3)
POTASSIUM SERPL-SCNC: 4.6 MMOL/L — SIGNIFICANT CHANGE UP (ref 3.5–5.3)
PROTHROM AB SERPL-ACNC: 12.7 SEC — SIGNIFICANT CHANGE UP (ref 10.5–13.4)
RBC # BLD: 3.47 M/UL — LOW (ref 4.2–5.8)
RBC # FLD: 18.8 % — HIGH (ref 10.3–14.5)
RH IG SCN BLD-IMP: POSITIVE — SIGNIFICANT CHANGE UP
SODIUM SERPL-SCNC: 144 MMOL/L — SIGNIFICANT CHANGE UP (ref 135–145)
WBC # BLD: 5.99 K/UL — SIGNIFICANT CHANGE UP (ref 3.8–10.5)
WBC # FLD AUTO: 5.99 K/UL — SIGNIFICANT CHANGE UP (ref 3.8–10.5)

## 2023-06-06 PROCEDURE — 99233 SBSQ HOSP IP/OBS HIGH 50: CPT | Mod: GC

## 2023-06-06 PROCEDURE — 99232 SBSQ HOSP IP/OBS MODERATE 35: CPT | Mod: 57

## 2023-06-06 RX ORDER — MAGNESIUM SULFATE 500 MG/ML
2 VIAL (ML) INJECTION ONCE
Refills: 0 | Status: COMPLETED | OUTPATIENT
Start: 2023-06-06 | End: 2023-06-06

## 2023-06-06 RX ORDER — MAGNESIUM SULFATE 500 MG/ML
1 VIAL (ML) INJECTION ONCE
Refills: 0 | Status: COMPLETED | OUTPATIENT
Start: 2023-06-06 | End: 2023-06-06

## 2023-06-06 RX ADMIN — Medication 100 GRAM(S): at 03:28

## 2023-06-06 RX ADMIN — HEPARIN SODIUM 5000 UNIT(S): 5000 INJECTION INTRAVENOUS; SUBCUTANEOUS at 21:40

## 2023-06-06 RX ADMIN — Medication 1300 MILLIGRAM(S): at 05:15

## 2023-06-06 RX ADMIN — CLOPIDOGREL BISULFATE 75 MILLIGRAM(S): 75 TABLET, FILM COATED ORAL at 12:15

## 2023-06-06 RX ADMIN — Medication 1300 MILLIGRAM(S): at 18:21

## 2023-06-06 RX ADMIN — SODIUM CHLORIDE 125 MILLILITER(S): 9 INJECTION, SOLUTION INTRAVENOUS at 19:52

## 2023-06-06 RX ADMIN — Medication 25 GRAM(S): at 14:10

## 2023-06-06 RX ADMIN — Medication 1: at 12:14

## 2023-06-06 RX ADMIN — Medication 325 MILLIGRAM(S): at 12:15

## 2023-06-06 RX ADMIN — CILOSTAZOL 100 MILLIGRAM(S): 100 TABLET ORAL at 05:15

## 2023-06-06 RX ADMIN — CILOSTAZOL 100 MILLIGRAM(S): 100 TABLET ORAL at 18:21

## 2023-06-06 RX ADMIN — ATORVASTATIN CALCIUM 40 MILLIGRAM(S): 80 TABLET, FILM COATED ORAL at 21:41

## 2023-06-06 RX ADMIN — CHLORHEXIDINE GLUCONATE 1 APPLICATION(S): 213 SOLUTION TOPICAL at 12:15

## 2023-06-06 RX ADMIN — HEPARIN SODIUM 5000 UNIT(S): 5000 INJECTION INTRAVENOUS; SUBCUTANEOUS at 05:16

## 2023-06-06 RX ADMIN — HEPARIN SODIUM 5000 UNIT(S): 5000 INJECTION INTRAVENOUS; SUBCUTANEOUS at 14:48

## 2023-06-06 RX ADMIN — Medication 50 MILLIGRAM(S): at 05:15

## 2023-06-06 NOTE — PROGRESS NOTE ADULT - ASSESSMENT
72M with h/o T2DM, CKD, PAD (s/p R & L SFA 04/07/2023 & 04/10/2023 respectively) c/b tarsal wounds that were c/f dry gangrene discharged to complete 6 weeks of ABx (to complete -5/25) sent in by vascular surgeon.    Acute kidney injury superimposed on CKD.   ·  Recommendation: Pt. with PRIYA on CKD. Documented in chart that he has CKD in previous notes. No labs except during last admission on file. During last admission, Scr noted to be 2.2-2.7, discharged on 2.23 on 4/18/23. During last admission, UA without evidence of significant hematuria or proteinuria. Renal sonogram without evidence of hydronephrosis.Please obtain a PVR bladder scan to r/u retention, place carranza if retaining. Obtain UA and repeat BMP at 5pm. Give 500 cc of IVF LR at over 4 hours.  Agree with holding ACE/ARB at this time. Monitor for urinary retention. Monitor labs and urine output. Dose medications as per eGFR. Would hold angiogram till creatinine back to baseline    Low serum bicarb- Add sodium bicarb 1300 BID. monitor serum bicarb.  - nephrology consult appreciated    HTN  - c/w metoprolol    diabetes  - fs qid  - hgb a1c 4.1  - insulin ss    PAD  - c/w asa. Plavix,  statin, pletal  - was seen by vascular  - Planing for angiogram, OR timing pending  - cleared by nephrology  - pt has no medical contraindications for the planned procedure    bilateral dehisced wounds  - seen by podiatry  - sutures removed  - no evidence of infection    anemia of chronic disease  - FOBT    dvt px  - sq heparin

## 2023-06-06 NOTE — PROGRESS NOTE ADULT - ASSESSMENT
72M with PMH of CKD4, PAD s/p stent, CAD, HTN, T2DM, smoker s/p R SFA stent (4/7), and L SFA stent (4/10), s/p b/l partial 1st and 2nd ray resection (4/12). Vascular surgery consulted in context of PAD.    Recommendations:  - LE Angiogram today  - NPO  - Appreciate medical optimization for procedure  - Appreciate cardiac optimization for procedure  - Appreciate nephro recommendations for planned angiogram  - Recommend continue PAD medications, pletal, ASA, plavix, statin   - Vascular to follow      Vascular Surgery  [id number]    72M with PMH of CKD4, PAD s/p stent, CAD, HTN, T2DM, smoker s/p R SFA stent (4/7), and L SFA stent (4/10), s/p b/l partial 1st and 2nd ray resection (4/12). Vascular surgery consulted in context of PAD.    Recommendations:  - LE Angiogram today  - NPO  - Appreciate medical optimization for procedure  - Appreciate cardiac optimization for procedure  - Appreciate nephro clearance for planned angiogram  - Recommend continue PAD medications, pletal, ASA, plavix, statin   - Vascular to follow      Vascular Surgery  [id number]

## 2023-06-07 LAB
ANION GAP SERPL CALC-SCNC: 11 MMOL/L — SIGNIFICANT CHANGE UP (ref 5–17)
APTT BLD: 31 SEC — SIGNIFICANT CHANGE UP (ref 27.5–35.5)
BLD GP AB SCN SERPL QL: NEGATIVE — SIGNIFICANT CHANGE UP
BUN SERPL-MCNC: 38 MG/DL — HIGH (ref 7–23)
CALCIUM SERPL-MCNC: 8.9 MG/DL — SIGNIFICANT CHANGE UP (ref 8.4–10.5)
CHLORIDE SERPL-SCNC: 111 MMOL/L — HIGH (ref 96–108)
CO2 SERPL-SCNC: 20 MMOL/L — LOW (ref 22–31)
CREAT SERPL-MCNC: 2.32 MG/DL — HIGH (ref 0.5–1.3)
CULTURE RESULTS: SIGNIFICANT CHANGE UP
CULTURE RESULTS: SIGNIFICANT CHANGE UP
EGFR: 29 ML/MIN/1.73M2 — LOW
GLUCOSE BLDC GLUCOMTR-MCNC: 124 MG/DL — HIGH (ref 70–99)
GLUCOSE BLDC GLUCOMTR-MCNC: 129 MG/DL — HIGH (ref 70–99)
GLUCOSE BLDC GLUCOMTR-MCNC: 134 MG/DL — HIGH (ref 70–99)
GLUCOSE BLDC GLUCOMTR-MCNC: 184 MG/DL — HIGH (ref 70–99)
GLUCOSE SERPL-MCNC: 131 MG/DL — HIGH (ref 70–99)
HCT VFR BLD CALC: 29 % — LOW (ref 39–50)
HGB BLD-MCNC: 9.9 G/DL — LOW (ref 13–17)
INR BLD: 1.14 RATIO — SIGNIFICANT CHANGE UP (ref 0.88–1.16)
MAGNESIUM SERPL-MCNC: 1.5 MG/DL — LOW (ref 1.6–2.6)
MCHC RBC-ENTMCNC: 28.2 PG — SIGNIFICANT CHANGE UP (ref 27–34)
MCHC RBC-ENTMCNC: 34.1 GM/DL — SIGNIFICANT CHANGE UP (ref 32–36)
MCV RBC AUTO: 82.6 FL — SIGNIFICANT CHANGE UP (ref 80–100)
NRBC # BLD: 0 /100 WBCS — SIGNIFICANT CHANGE UP (ref 0–0)
PHOSPHATE SERPL-MCNC: 3.2 MG/DL — SIGNIFICANT CHANGE UP (ref 2.5–4.5)
PLATELET # BLD AUTO: 122 K/UL — LOW (ref 150–400)
POTASSIUM SERPL-MCNC: 4.6 MMOL/L — SIGNIFICANT CHANGE UP (ref 3.5–5.3)
POTASSIUM SERPL-SCNC: 4.6 MMOL/L — SIGNIFICANT CHANGE UP (ref 3.5–5.3)
PROTHROM AB SERPL-ACNC: 13.1 SEC — SIGNIFICANT CHANGE UP (ref 10.5–13.4)
RBC # BLD: 3.51 M/UL — LOW (ref 4.2–5.8)
RBC # FLD: 18.7 % — HIGH (ref 10.3–14.5)
RH IG SCN BLD-IMP: POSITIVE — SIGNIFICANT CHANGE UP
SODIUM SERPL-SCNC: 142 MMOL/L — SIGNIFICANT CHANGE UP (ref 135–145)
SPECIMEN SOURCE: SIGNIFICANT CHANGE UP
SPECIMEN SOURCE: SIGNIFICANT CHANGE UP
WBC # BLD: 6.68 K/UL — SIGNIFICANT CHANGE UP (ref 3.8–10.5)
WBC # FLD AUTO: 6.68 K/UL — SIGNIFICANT CHANGE UP (ref 3.8–10.5)

## 2023-06-07 PROCEDURE — 75710 ARTERY X-RAYS ARM/LEG: CPT | Mod: 26,59

## 2023-06-07 PROCEDURE — 76937 US GUIDE VASCULAR ACCESS: CPT | Mod: 26

## 2023-06-07 PROCEDURE — 37224: CPT | Mod: RT

## 2023-06-07 PROCEDURE — 37228: CPT | Mod: RT

## 2023-06-07 DEVICE — MICRO-INTRODUCER KIT 5FR 60CM NITINOL TC TIP 7CM ECHOGENIC REGULAR: Type: IMPLANTABLE DEVICE | Site: RIGHT | Status: FUNCTIONAL

## 2023-06-07 DEVICE — GUIDEWIRE RADIFOCUS GLIDEWIRE ANGLED 0.035" X 260CM STIFF: Type: IMPLANTABLE DEVICE | Site: RIGHT | Status: FUNCTIONAL

## 2023-06-07 DEVICE — CATH QUICK CROSS .014X135CM: Type: IMPLANTABLE DEVICE | Site: RIGHT | Status: FUNCTIONAL

## 2023-06-07 DEVICE — SHEATH INTRODUCER TERUMO PINNACLE PERIPHERAL 6FR X 10CM: Type: IMPLANTABLE DEVICE | Site: RIGHT | Status: FUNCTIONAL

## 2023-06-07 DEVICE — SHEATH GUIDING STR 6FRX70CM: Type: IMPLANTABLE DEVICE | Site: RIGHT | Status: FUNCTIONAL

## 2023-06-07 DEVICE — CATH OMNI FLSH 0.035IN 5FRX65: Type: IMPLANTABLE DEVICE | Site: RIGHT | Status: FUNCTIONAL

## 2023-06-07 DEVICE — DEVICE CLOSURE 6F/7F MYNX GRIP MUST ORDER MIN OF 10 EA: Type: IMPLANTABLE DEVICE | Site: RIGHT | Status: FUNCTIONAL

## 2023-06-07 DEVICE — BLLN COYOTE OTW 2X60MMX150CM: Type: IMPLANTABLE DEVICE | Site: RIGHT | Status: FUNCTIONAL

## 2023-06-07 DEVICE — CATH ANGIO GLIDECATH ANGLE 4FR X 100CM: Type: IMPLANTABLE DEVICE | Site: RIGHT | Status: FUNCTIONAL

## 2023-06-07 DEVICE — BLLN MUSTANG 5X100MMX135CM: Type: IMPLANTABLE DEVICE | Site: RIGHT | Status: FUNCTIONAL

## 2023-06-07 DEVICE — WIRE GUIDE COMMAND ES 300CM: Type: IMPLANTABLE DEVICE | Site: RIGHT | Status: FUNCTIONAL

## 2023-06-07 DEVICE — GUIDEWIRE TERUMO GLIDEWIRE ANGLED .035" X 150CM STANDARD: Type: IMPLANTABLE DEVICE | Site: RIGHT | Status: FUNCTIONAL

## 2023-06-07 DEVICE — IMPLANTABLE DEVICE: Type: IMPLANTABLE DEVICE | Site: RIGHT | Status: FUNCTIONAL

## 2023-06-07 DEVICE — SHEATH INTRODUCER TERUMO PINNACLE PERIPHERAL 5FR X 10CM: Type: IMPLANTABLE DEVICE | Site: RIGHT | Status: FUNCTIONAL

## 2023-06-07 RX ORDER — HYDROMORPHONE HYDROCHLORIDE 2 MG/ML
0.25 INJECTION INTRAMUSCULAR; INTRAVENOUS; SUBCUTANEOUS
Refills: 0 | Status: DISCONTINUED | OUTPATIENT
Start: 2023-06-07 | End: 2023-06-07

## 2023-06-07 RX ORDER — CILOSTAZOL 100 MG/1
100 TABLET ORAL
Refills: 0 | Status: DISCONTINUED | OUTPATIENT
Start: 2023-06-07 | End: 2023-06-08

## 2023-06-07 RX ORDER — DEXTROSE 50 % IN WATER 50 %
15 SYRINGE (ML) INTRAVENOUS ONCE
Refills: 0 | Status: DISCONTINUED | OUTPATIENT
Start: 2023-06-07 | End: 2023-06-08

## 2023-06-07 RX ORDER — ATORVASTATIN CALCIUM 80 MG/1
40 TABLET, FILM COATED ORAL AT BEDTIME
Refills: 0 | Status: DISCONTINUED | OUTPATIENT
Start: 2023-06-07 | End: 2023-06-08

## 2023-06-07 RX ORDER — DEXTROSE 50 % IN WATER 50 %
25 SYRINGE (ML) INTRAVENOUS ONCE
Refills: 0 | Status: DISCONTINUED | OUTPATIENT
Start: 2023-06-07 | End: 2023-06-08

## 2023-06-07 RX ORDER — ASPIRIN/CALCIUM CARB/MAGNESIUM 324 MG
325 TABLET ORAL DAILY
Refills: 0 | Status: DISCONTINUED | OUTPATIENT
Start: 2023-06-07 | End: 2023-06-08

## 2023-06-07 RX ORDER — DEXTROSE 50 % IN WATER 50 %
12.5 SYRINGE (ML) INTRAVENOUS ONCE
Refills: 0 | Status: DISCONTINUED | OUTPATIENT
Start: 2023-06-07 | End: 2023-06-08

## 2023-06-07 RX ORDER — LABETALOL HCL 100 MG
20 TABLET ORAL
Refills: 0 | Status: DISCONTINUED | OUTPATIENT
Start: 2023-06-07 | End: 2023-06-08

## 2023-06-07 RX ORDER — GLUCAGON INJECTION, SOLUTION 0.5 MG/.1ML
1 INJECTION, SOLUTION SUBCUTANEOUS ONCE
Refills: 0 | Status: DISCONTINUED | OUTPATIENT
Start: 2023-06-07 | End: 2023-06-08

## 2023-06-07 RX ORDER — HYDROMORPHONE HYDROCHLORIDE 2 MG/ML
0.5 INJECTION INTRAMUSCULAR; INTRAVENOUS; SUBCUTANEOUS
Refills: 0 | Status: DISCONTINUED | OUTPATIENT
Start: 2023-06-07 | End: 2023-06-07

## 2023-06-07 RX ORDER — ACETAMINOPHEN 500 MG
1000 TABLET ORAL ONCE
Refills: 0 | Status: DISCONTINUED | OUTPATIENT
Start: 2023-06-07 | End: 2023-06-07

## 2023-06-07 RX ORDER — INSULIN LISPRO 100/ML
VIAL (ML) SUBCUTANEOUS AT BEDTIME
Refills: 0 | Status: DISCONTINUED | OUTPATIENT
Start: 2023-06-07 | End: 2023-06-08

## 2023-06-07 RX ORDER — CLOPIDOGREL BISULFATE 75 MG/1
75 TABLET, FILM COATED ORAL DAILY
Refills: 0 | Status: DISCONTINUED | OUTPATIENT
Start: 2023-06-07 | End: 2023-06-08

## 2023-06-07 RX ORDER — CHLORHEXIDINE GLUCONATE 213 G/1000ML
1 SOLUTION TOPICAL DAILY
Refills: 0 | Status: DISCONTINUED | OUTPATIENT
Start: 2023-06-07 | End: 2023-06-08

## 2023-06-07 RX ORDER — ONDANSETRON 8 MG/1
4 TABLET, FILM COATED ORAL ONCE
Refills: 0 | Status: DISCONTINUED | OUTPATIENT
Start: 2023-06-07 | End: 2023-06-07

## 2023-06-07 RX ORDER — SODIUM BICARBONATE 1 MEQ/ML
1300 SYRINGE (ML) INTRAVENOUS
Refills: 0 | Status: DISCONTINUED | OUTPATIENT
Start: 2023-06-07 | End: 2023-06-08

## 2023-06-07 RX ORDER — HEPARIN SODIUM 5000 [USP'U]/ML
5000 INJECTION INTRAVENOUS; SUBCUTANEOUS EVERY 8 HOURS
Refills: 0 | Status: DISCONTINUED | OUTPATIENT
Start: 2023-06-07 | End: 2023-06-08

## 2023-06-07 RX ORDER — INSULIN LISPRO 100/ML
VIAL (ML) SUBCUTANEOUS
Refills: 0 | Status: DISCONTINUED | OUTPATIENT
Start: 2023-06-07 | End: 2023-06-08

## 2023-06-07 RX ORDER — HYDRALAZINE HCL 50 MG
10 TABLET ORAL
Refills: 0 | Status: DISCONTINUED | OUTPATIENT
Start: 2023-06-07 | End: 2023-06-08

## 2023-06-07 RX ADMIN — CHLORHEXIDINE GLUCONATE 1 APPLICATION(S): 213 SOLUTION TOPICAL at 09:30

## 2023-06-07 RX ADMIN — HEPARIN SODIUM 5000 UNIT(S): 5000 INJECTION INTRAVENOUS; SUBCUTANEOUS at 05:00

## 2023-06-07 RX ADMIN — CILOSTAZOL 100 MILLIGRAM(S): 100 TABLET ORAL at 05:01

## 2023-06-07 RX ADMIN — SODIUM CHLORIDE 125 MILLILITER(S): 9 INJECTION, SOLUTION INTRAVENOUS at 05:01

## 2023-06-07 RX ADMIN — Medication 50 MILLIGRAM(S): at 05:01

## 2023-06-07 RX ADMIN — CILOSTAZOL 100 MILLIGRAM(S): 100 TABLET ORAL at 18:53

## 2023-06-07 RX ADMIN — HEPARIN SODIUM 5000 UNIT(S): 5000 INJECTION INTRAVENOUS; SUBCUTANEOUS at 21:08

## 2023-06-07 RX ADMIN — CLOPIDOGREL BISULFATE 75 MILLIGRAM(S): 75 TABLET, FILM COATED ORAL at 17:29

## 2023-06-07 RX ADMIN — Medication 325 MILLIGRAM(S): at 17:29

## 2023-06-07 RX ADMIN — ATORVASTATIN CALCIUM 40 MILLIGRAM(S): 80 TABLET, FILM COATED ORAL at 21:08

## 2023-06-07 RX ADMIN — Medication 1300 MILLIGRAM(S): at 18:53

## 2023-06-07 RX ADMIN — Medication 1300 MILLIGRAM(S): at 05:01

## 2023-06-07 NOTE — PRE-ANESTHESIA EVALUATION ADULT - NSRADCARDRESULTSFT_GEN_ALL_CORE
TTE 4/6/23:    CONCLUSIONS:      1. Small left ventricular cavity size. The left ventricular wall thickness is mildly increased. The left ventricular systolic function is normal with an ejection fraction of 63 % by Padilla's method of disks. There are no regional wall motion abnormalities seen.   2. There is normal left ventricular diastolic function.   3. Global longitudinal strain is -25.4 % (normal < -18%). GLS was assessed on [employer] echo machine with a heart rate of 78 bpm and a blood pressure of 126/73 mmHg.   4. Normal right ventricular cavity size, normal wall thickness and normal systolic function. The tricuspid annular plane systolic excursion (TAPSE) is 2.1 cm (normal >=1.7 cm).   5. Fibrocalcific aortic valve sclerosis without stenosis.   6. Trace aortic regurgitation   7. .There is mitral valve thickening of the anterior and posterior leaflets. There is trace mitral regurgitation.   8. No prior echocardiogram is available for comparison.    ________________________________________________________________________________________  FINDINGS:  Left Ventricle:  The left ventricular wall thickness is mildly increased. The left ventricular systolic function is normal with a calculated ejection fraction of 63 % by the Padilla's biplane method of disks. There are no regional wall motion abnormalities seen. There is normal left ventricular diastolic function, with normal filling pressure. Global longitudinal strain is -25.4 % (normal < -18%). GLS was assessed on [employer] echo machine with a heart rate of 78 bpm and a blood pressure of 126/73 mmHg. Basal septal hypertrophy seen.     Right Ventricle:  Normal right ventricular cavity size, normal wall thickness and normal systolic function. Tricuspid annular plane systolic excursion (TAPSE) is 2.1 cm (normal >=1.7 cm).     Left Atrium:  The left atrium is normal, with an indexed volume of 26 ml/m².     Right Atrium:  The right atrium is normal.     Interatrial Septum:  The interatrial septum appears intact.     Aortic Valve:  The aortic valve appears trileaflet. There is fibrocalcific aortic valve sclerosis without stenosis. There is trace aortic regurgitation.     Mitral Valve:  There is mitral valve thickening of the anterior and posterior leaflets. There is trace mitral regurgitation.     Tricuspid Valve:  The tricuspid valve is normal in structure and function, with good leaflet excursion, and without any evidence of stenosis or significant regurgitation. Structurally normal tricuspid valve with normal leaflet excursion. There is trace tricuspid regurgitation. There is insufficient tricuspid regurgitation detected to calculate pulmonary artery systolic pressure.     Pulmonic Valve:  The pulmonic valve was not well visualized. Structurally normal pulmonic valve with normal leaflet excursion. There is trace pulmonic regurgitation.     Aorta:  The aortic annulus and aortic root appear normal in size.     Pericardium:  No pericardial effusion seen.

## 2023-06-07 NOTE — PRE-ANESTHESIA EVALUATION ADULT - NSANTHPMHFT_GEN_ALL_CORE
As per Dr. Liu, patient is intermediate to low-moderate risk for procedure and is optimized from cardiac standpoint. (6/6/23) As per Dr. Liu, patient is intermediate to low-moderate risk for procedure and is optimized from cardiac standpoint. (6/6/23)  CAD with stents: denies MI. Stents placed years ago. Last took ASA and Plavix on 6/6/23

## 2023-06-07 NOTE — PROGRESS NOTE ADULT - ASSESSMENT
72M with PMH of CKD4, PAD s/p stent, CAD, HTN, T2DM, smoker s/p R SFA stent (4/7), and L SFA stent (4/10), s/p b/l partial 1st and 2nd ray resection (4/12). Vascular surgery consulted in context of PAD. Awaiting angiogram.     Recommendations:  - LE Angiogram today  - NPO  - Appreciate medical optimization for procedure  - Appreciate cardiac optimization for procedure  - Appreciate nephro clearance for planned angiogram  - Recommend continue PAD medications, pletal, ASA, plavix, statin   - Vascular to follow      Vascular Surgery  [id number]

## 2023-06-07 NOTE — PRE-ANESTHESIA EVALUATION ADULT - NSANTHAIRWAYFT_ENT_ALL_CORE
FROM  Luis  Neck circumference <17cm  Interincisor distance >2 finger breadths  TM distance >2 finger breadths  Teeth intact but missing various upper teeth

## 2023-06-07 NOTE — PROGRESS NOTE ADULT - ASSESSMENT
72 M with bilateral dehisced wounds  - Patient seen and evaluated  - Afebrile, leukocytosis, ESR 6, CRP 0.4  -  4/12 s/p bilateral partial first and second ray resection dehisced wounds with well adhered dry eschar, no erythema, no edema, no drainage, no pus,  not acute signs of infection.   - No cultures taken 2/2 to no clinical signs of infection  - vasc planning angio, appreciated  - pod plan local wound care versus right foot wound debridement and graft application pending vasc recs  - Medical clearance and cardiology clearance documented (appreciated)  - Seen with attending   72 M with bilateral dehisced wounds  - Patient seen and evaluated  - Afebrile, leukocytosis, ESR 6, CRP 0.4  -  4/12 s/p bilateral partial first and second ray resection dehisced wounds with well adhered dry eschar, no erythema, no edema, no drainage, no pus,  not acute signs of infection.   - No cultures taken 2/2 to no clinical signs of infection  - s/p RLE angio w/ SFA pop and PT plasty, appreacited  - pod stable for discharge, follow up in wound care center for possible HBOT, follow up info in dishcarge note provider  - Seen with attending

## 2023-06-07 NOTE — PROGRESS NOTE ADULT - ASSESSMENT
72M with h/o T2DM, CKD, PAD (s/p R & L SFA 04/07/2023 & 04/10/2023 respectively) c/b tarsal wounds that were c/f dry gangrene discharged to complete 6 weeks of ABx (to complete -5/25) sent in by vascular surgeon.    Acute kidney injury superimposed on CKD.   ·  Recommendation: Pt. with PRIYA on CKD. Documented in chart that he has CKD in previous notes. No labs except during last admission on file. During last admission, Scr noted to be 2.2-2.7, discharged on 2.23 on 4/18/23. During last admission, UA without evidence of significant hematuria or proteinuria. Renal sonogram without evidence of hydronephrosis.Please obtain a PVR bladder scan to r/u retention, place carranza if retaining. Obtain UA and repeat BMP at 5pm. Give 500 cc of IVF LR at over 4 hours.  Agree with holding ACE/ARB at this time. Monitor for urinary retention. Monitor labs and urine output. Dose medications as per eGFR. Would hold angiogram till creatinine back to baseline    Low serum bicarb- Add sodium bicarb 1300 BID. monitor serum bicarb.  - nephrology consult appreciated    HTN  - c/w metoprolol    diabetes  - fs qid  - hgb a1c 4.1  - insulin ss    PAD  - was seen by vascular  - S/P RLE angiogram with angioplasty of pop and PT  - ASA, Pletal, Plavix, SQH  - Pain control as needed  - Cleared for discharge from vascular standpoint    bilateral dehisced wounds  - Patient seen and evaluated by podiatry  - Afebrile, leukocytosis, ESR 6, CRP 0.4  -  4/12 s/p bilateral partial first and second ray resection dehisced wounds with well adhered dry eschar, no erythema, no edema, no drainage, no pus,  not acute signs of infection.   - No cultures taken 2/2 to no clinical signs of infection  - s/p RLE angio w/ SFA pop and PT plasty, appreacited  - pod stable for discharge, follow up in wound care center for possible HBOT, follow up info in dishcarge note provider    anemia of chronic disease  - FOBT    dvt px  - sq heparin

## 2023-06-07 NOTE — BRIEF OPERATIVE NOTE - OPERATION/FINDINGS
RLE angiogram, R SFA stent patent, 50% stenosis in Popliteal, 60% stenosis in PT  Angioplasty of pop and PT  Mynx closure Size Of Lesion In Cm: 0.9

## 2023-06-08 ENCOUNTER — TRANSCRIPTION ENCOUNTER (OUTPATIENT)
Age: 73
End: 2023-06-08

## 2023-06-08 VITALS
SYSTOLIC BLOOD PRESSURE: 100 MMHG | HEART RATE: 130 BPM | DIASTOLIC BLOOD PRESSURE: 69 MMHG | OXYGEN SATURATION: 97 % | TEMPERATURE: 98 F | RESPIRATION RATE: 18 BRPM

## 2023-06-08 LAB
ANION GAP SERPL CALC-SCNC: 14 MMOL/L — SIGNIFICANT CHANGE UP (ref 5–17)
BUN SERPL-MCNC: 42 MG/DL — HIGH (ref 7–23)
CALCIUM SERPL-MCNC: 9 MG/DL — SIGNIFICANT CHANGE UP (ref 8.4–10.5)
CHLORIDE SERPL-SCNC: 109 MMOL/L — HIGH (ref 96–108)
CO2 SERPL-SCNC: 19 MMOL/L — LOW (ref 22–31)
CREAT SERPL-MCNC: 2.5 MG/DL — HIGH (ref 0.5–1.3)
EGFR: 27 ML/MIN/1.73M2 — LOW
GLUCOSE BLDC GLUCOMTR-MCNC: 124 MG/DL — HIGH (ref 70–99)
GLUCOSE BLDC GLUCOMTR-MCNC: 237 MG/DL — HIGH (ref 70–99)
GLUCOSE SERPL-MCNC: 118 MG/DL — HIGH (ref 70–99)
HCT VFR BLD CALC: 31.4 % — LOW (ref 39–50)
HGB BLD-MCNC: 10.7 G/DL — LOW (ref 13–17)
MCHC RBC-ENTMCNC: 28.3 PG — SIGNIFICANT CHANGE UP (ref 27–34)
MCHC RBC-ENTMCNC: 34.1 GM/DL — SIGNIFICANT CHANGE UP (ref 32–36)
MCV RBC AUTO: 83.1 FL — SIGNIFICANT CHANGE UP (ref 80–100)
NRBC # BLD: 0 /100 WBCS — SIGNIFICANT CHANGE UP (ref 0–0)
PLATELET # BLD AUTO: 159 K/UL — SIGNIFICANT CHANGE UP (ref 150–400)
POTASSIUM SERPL-MCNC: 5.4 MMOL/L — HIGH (ref 3.5–5.3)
POTASSIUM SERPL-SCNC: 5.4 MMOL/L — HIGH (ref 3.5–5.3)
RBC # BLD: 3.78 M/UL — LOW (ref 4.2–5.8)
RBC # FLD: 18.6 % — HIGH (ref 10.3–14.5)
SODIUM SERPL-SCNC: 142 MMOL/L — SIGNIFICANT CHANGE UP (ref 135–145)
WBC # BLD: 10.49 K/UL — SIGNIFICANT CHANGE UP (ref 3.8–10.5)
WBC # FLD AUTO: 10.49 K/UL — SIGNIFICANT CHANGE UP (ref 3.8–10.5)

## 2023-06-08 PROCEDURE — 84145 PROCALCITONIN (PCT): CPT

## 2023-06-08 PROCEDURE — 82962 GLUCOSE BLOOD TEST: CPT

## 2023-06-08 PROCEDURE — 97116 GAIT TRAINING THERAPY: CPT

## 2023-06-08 PROCEDURE — 85610 PROTHROMBIN TIME: CPT

## 2023-06-08 PROCEDURE — 82947 ASSAY GLUCOSE BLOOD QUANT: CPT

## 2023-06-08 PROCEDURE — 83550 IRON BINDING TEST: CPT

## 2023-06-08 PROCEDURE — C1725: CPT

## 2023-06-08 PROCEDURE — 83605 ASSAY OF LACTIC ACID: CPT

## 2023-06-08 PROCEDURE — 84443 ASSAY THYROID STIM HORMONE: CPT

## 2023-06-08 PROCEDURE — 84295 ASSAY OF SERUM SODIUM: CPT

## 2023-06-08 PROCEDURE — 82607 VITAMIN B-12: CPT

## 2023-06-08 PROCEDURE — 83036 HEMOGLOBIN GLYCOSYLATED A1C: CPT

## 2023-06-08 PROCEDURE — 76000 FLUOROSCOPY <1 HR PHYS/QHP: CPT

## 2023-06-08 PROCEDURE — 84132 ASSAY OF SERUM POTASSIUM: CPT

## 2023-06-08 PROCEDURE — 85018 HEMOGLOBIN: CPT

## 2023-06-08 PROCEDURE — 83540 ASSAY OF IRON: CPT

## 2023-06-08 PROCEDURE — 86850 RBC ANTIBODY SCREEN: CPT

## 2023-06-08 PROCEDURE — 82272 OCCULT BLD FECES 1-3 TESTS: CPT

## 2023-06-08 PROCEDURE — 85730 THROMBOPLASTIN TIME PARTIAL: CPT

## 2023-06-08 PROCEDURE — 84100 ASSAY OF PHOSPHORUS: CPT

## 2023-06-08 PROCEDURE — 83735 ASSAY OF MAGNESIUM: CPT

## 2023-06-08 PROCEDURE — 82746 ASSAY OF FOLIC ACID SERUM: CPT

## 2023-06-08 PROCEDURE — 80053 COMPREHEN METABOLIC PANEL: CPT

## 2023-06-08 PROCEDURE — 87640 STAPH A DNA AMP PROBE: CPT

## 2023-06-08 PROCEDURE — 86901 BLOOD TYPING SEROLOGIC RH(D): CPT

## 2023-06-08 PROCEDURE — 80048 BASIC METABOLIC PNL TOTAL CA: CPT

## 2023-06-08 PROCEDURE — 81003 URINALYSIS AUTO W/O SCOPE: CPT

## 2023-06-08 PROCEDURE — 99291 CRITICAL CARE FIRST HOUR: CPT

## 2023-06-08 PROCEDURE — 87040 BLOOD CULTURE FOR BACTERIA: CPT

## 2023-06-08 PROCEDURE — C1887: CPT

## 2023-06-08 PROCEDURE — 93005 ELECTROCARDIOGRAM TRACING: CPT

## 2023-06-08 PROCEDURE — 87641 MR-STAPH DNA AMP PROBE: CPT

## 2023-06-08 PROCEDURE — 76770 US EXAM ABDO BACK WALL COMP: CPT

## 2023-06-08 PROCEDURE — 85027 COMPLETE CBC AUTOMATED: CPT

## 2023-06-08 PROCEDURE — C1894: CPT

## 2023-06-08 PROCEDURE — C1760: CPT

## 2023-06-08 PROCEDURE — 86140 C-REACTIVE PROTEIN: CPT

## 2023-06-08 PROCEDURE — 86900 BLOOD TYPING SEROLOGIC ABO: CPT

## 2023-06-08 PROCEDURE — 85025 COMPLETE CBC W/AUTO DIFF WBC: CPT

## 2023-06-08 PROCEDURE — 36415 COLL VENOUS BLD VENIPUNCTURE: CPT

## 2023-06-08 PROCEDURE — 73620 X-RAY EXAM OF FOOT: CPT

## 2023-06-08 PROCEDURE — 82435 ASSAY OF BLOOD CHLORIDE: CPT

## 2023-06-08 PROCEDURE — 97530 THERAPEUTIC ACTIVITIES: CPT

## 2023-06-08 PROCEDURE — 85652 RBC SED RATE AUTOMATED: CPT

## 2023-06-08 PROCEDURE — 97161 PT EVAL LOW COMPLEX 20 MIN: CPT

## 2023-06-08 PROCEDURE — C1769: CPT

## 2023-06-08 PROCEDURE — 82330 ASSAY OF CALCIUM: CPT

## 2023-06-08 PROCEDURE — 82803 BLOOD GASES ANY COMBINATION: CPT

## 2023-06-08 PROCEDURE — 85014 HEMATOCRIT: CPT

## 2023-06-08 PROCEDURE — 99232 SBSQ HOSP IP/OBS MODERATE 35: CPT | Mod: GC

## 2023-06-08 RX ORDER — LISINOPRIL 2.5 MG/1
1 TABLET ORAL
Refills: 0 | DISCHARGE

## 2023-06-08 RX ORDER — METOPROLOL TARTRATE 50 MG
0 TABLET ORAL
Refills: 0 | DISCHARGE

## 2023-06-08 RX ORDER — SODIUM BICARBONATE 1 MEQ/ML
2 SYRINGE (ML) INTRAVENOUS
Qty: 120 | Refills: 0
Start: 2023-06-08 | End: 2023-07-07

## 2023-06-08 RX ADMIN — CLOPIDOGREL BISULFATE 75 MILLIGRAM(S): 75 TABLET, FILM COATED ORAL at 12:03

## 2023-06-08 RX ADMIN — Medication 1300 MILLIGRAM(S): at 05:29

## 2023-06-08 RX ADMIN — HEPARIN SODIUM 5000 UNIT(S): 5000 INJECTION INTRAVENOUS; SUBCUTANEOUS at 05:29

## 2023-06-08 RX ADMIN — Medication 2: at 12:02

## 2023-06-08 RX ADMIN — CHLORHEXIDINE GLUCONATE 1 APPLICATION(S): 213 SOLUTION TOPICAL at 12:03

## 2023-06-08 RX ADMIN — CILOSTAZOL 100 MILLIGRAM(S): 100 TABLET ORAL at 05:41

## 2023-06-08 RX ADMIN — Medication 325 MILLIGRAM(S): at 12:03

## 2023-06-08 NOTE — PROGRESS NOTE ADULT - ASSESSMENT
72 M with bilateral dehisced wounds  - Patient seen and evaluated  - Afebrile, leukocytosis, ESR 6, CRP 0.4  -  4/12 s/p bilateral partial first and second ray resection dehisced wounds with well adhered dry eschar, no erythema, no edema, no drainage, no pus,  not acute signs of infection.   - No cultures taken 2/2 to no clinical signs of infection  - s/p RLE angio w/ SFA pop and PT plasty, appreacited  - pod stable for discharge, follow up in wound care center for possible HBOT, follow up info in dishcarge note provider  - Discussed with attending

## 2023-06-08 NOTE — DISCHARGE NOTE NURSING/CASE MANAGEMENT/SOCIAL WORK - PATIENT PORTAL LINK FT
You can access the FollowMyHealth Patient Portal offered by [facility] by registering at the following website: [email or web address]. By joining [facility]’s FollowMyHealth portal, you will also be able to view your health information using other applications (apps) compatible with our system.

## 2023-06-08 NOTE — PROGRESS NOTE ADULT - PROBLEM SELECTOR PLAN 2
Resolved. Low potassium diet. Continue to monitor serum potassium.
Potassium is elevated at 5.4. Please give lokelma 5g. Ensure low potassium diet please.     If you have any questions, please feel free to contact me  Lorne Posada  Nephrology Fellow  [phone]; Prefer [employer] TEAMS  (After 5pm or on weekends please page the on-call fellow).
Resolved. Low potassium diet. Continue to monitor serum potassium.

## 2023-06-08 NOTE — DISCHARGE NOTE NURSING/CASE MANAGEMENT/SOCIAL WORK - NSDCPEFALRISK_GEN_ALL_CORE
For information on Fall & Injury Prevention, visit: [email or web address] OR  [email or web address] OR  https://www.cdc.gov/steadi/patient.html

## 2023-06-08 NOTE — PROGRESS NOTE ADULT - ASSESSMENT
72M with h/o T2DM, CKD, PAD (s/p R & L SFA 04/07/2023 & 04/10/2023 respectively) c/b tarsal wounds that were c/f dry gangrene discharged to complete 6 weeks of ABx (to complete -5/25) sent in by vascular surgeon.    Acute kidney injury superimposed on CKD.   ·  Recommendation: Pt. with PRIYA on CKD. Documented in chart that he has CKD in previous notes. No labs except during last admission on file. During last admission, Scr noted to be 2.2-2.7, discharged on 2.23 on 4/18/23. During last admission, UA without evidence of significant hematuria or proteinuria. Renal sonogram without evidence of hydronephrosis.Please obtain a PVR bladder scan to r/u retention, place carranza if retaining. Obtain UA and repeat BMP at 5pm. Give 500 cc of IVF LR at over 4 hours.  Agree with holding ACE/ARB at this time. Monitor for urinary retention. Monitor labs and urine output. Dose medications as per eGFR. Would hold angiogram till creatinine back to baseline    Low serum bicarb- Add sodium bicarb 1300 BID. monitor serum bicarb.  - nephrology consult appreciated    HTN  - c/w metoprolol    diabetes  - fs qid  - hgb a1c 4.1  - insulin ss    PAD  - was seen by vascular  - S/P RLE angiogram with angioplasty of pop and PT  - ASA, Pletal, Plavix, SQH  - Pain control as needed  - Cleared for discharge from vascular standpoint    bilateral dehisced wounds  - Patient seen and evaluated by podiatry  - Afebrile, leukocytosis, ESR 6, CRP 0.4  -  4/12 s/p bilateral partial first and second ray resection dehisced wounds with well adhered dry eschar, no erythema, no edema, no drainage, no pus,  not acute signs of infection.   - No cultures taken 2/2 to no clinical signs of infection  - s/p RLE angio w/ SFA pop and PT plasty, appreacited  - pod stable for discharge, follow up in wound care center for possible HBOT, follow up info in dishcarge note provider    anemia of chronic disease  - FOBT    dvt px  - sq heparin    d/c home  - follow up with podiatry and vascular

## 2023-06-08 NOTE — PROGRESS NOTE ADULT - SUBJECTIVE AND OBJECTIVE BOX
DATE OF SERVICE: 06-03-23 @ 10:17    Patient is a 72y old  Male who presents with a chief complaint of     SUBJECTIVE / OVERNIGHT EVENTS:    MEDICATIONS  (STANDING):  aspirin 325 milliGRAM(s) Oral daily  atorvastatin 40 milliGRAM(s) Oral at bedtime  chlorhexidine 2% Cloths 1 Application(s) Topical daily  cilostazol 100 milliGRAM(s) Oral two times a day  clopidogrel Tablet 75 milliGRAM(s) Oral daily  dextrose 5%. 1000 milliLiter(s) (100 mL/Hr) IV Continuous <Continuous>  dextrose 5%. 1000 milliLiter(s) (50 mL/Hr) IV Continuous <Continuous>  dextrose 50% Injectable 12.5 Gram(s) IV Push once  dextrose 50% Injectable 25 Gram(s) IV Push once  dextrose 50% Injectable 25 Gram(s) IV Push once  glucagon  Injectable 1 milliGRAM(s) IntraMuscular once  heparin   Injectable 5000 Unit(s) SubCutaneous every 8 hours  insulin lispro (ADMELOG) corrective regimen sliding scale   SubCutaneous three times a day before meals  insulin lispro (ADMELOG) corrective regimen sliding scale   SubCutaneous at bedtime  metoprolol succinate ER 50 milliGRAM(s) Oral daily    MEDICATIONS  (PRN):  dextrose Oral Gel 15 Gram(s) Oral once PRN Blood Glucose LESS THAN 70 milliGRAM(s)/deciliter      Vital Signs Last 24 Hrs  T(C): 36.6 (03 Jun 2023 04:30), Max: 36.9 (02 Jun 2023 11:31)  T(F): 97.8 (03 Jun 2023 04:30), Max: 98.4 (02 Jun 2023 11:31)  HR: 79 (03 Jun 2023 04:30) (65 - 90)  BP: 156/92 (03 Jun 2023 04:30) (120/67 - 167/78)  BP(mean): --  RR: 18 (03 Jun 2023 04:30) (17 - 18)  SpO2: 100% (03 Jun 2023 04:30) (99% - 100%)    Parameters below as of 03 Jun 2023 04:30  Patient On (Oxygen Delivery Method): room air      CAPILLARY BLOOD GLUCOSE      POCT Blood Glucose.: 107 mg/dL (03 Jun 2023 07:59)  POCT Blood Glucose.: 115 mg/dL (02 Jun 2023 21:15)  POCT Blood Glucose.: 87 mg/dL (02 Jun 2023 17:55)    I&O's Summary    03 Jun 2023 07:01  -  03 Jun 2023 10:17  --------------------------------------------------------  IN: 240 mL / OUT: 0 mL / NET: 240 mL        PHYSICAL EXAM:  GENERAL: NAD, well-developed  HEAD:  Atraumatic, Normocephalic  EYES: EOMI, PERRLA, conjunctiva and sclera clear  NECK: Supple, No JVD  CHEST/LUNG: Clear to auscultation bilaterally; No wheeze  HEART: Regular rate and rhythm; No murmurs, rubs, or gallops  ABDOMEN: Soft, Nontender, Nondistended; Bowel sounds present  EXTREMITIES:  2+ Peripheral Pulses, No clubbing, cyanosis, or edema  PSYCH: AAOx3  NEUROLOGY: non-focal  SKIN: No rashes or lesions    LABS:                        11.2   6.78  )-----------( 164      ( 03 Jun 2023 07:17 )             33.5     06-03    142  |  113<H>  |  55<H>  ----------------------------<  84  6.1<H>   |  15<L>  |  3.00<H>    Ca    9.2      03 Jun 2023 07:07  Phos  3.3     06-03  Mg     2.0     06-03    TPro  7.8  /  Alb  4.2  /  TBili  0.3  /  DBili  x   /  AST  10  /  ALT  10  /  AlkPhos  93  06-02    PT/INR - ( 02 Jun 2023 12:57 )   PT: 12.8 sec;   INR: 1.11 ratio         PTT - ( 02 Jun 2023 12:57 )  PTT:27.5 sec    < from: US Kidney and Bladder (06.02.23 @ 19:28) >  FINDINGS: Increased echogenicity of the renal parenchyma identified   raising suspicion for chronic medical renal disease. Contour lobulation   of the renal parenchyma identified.    Right kidney: 9.4 cm. Fullness of the intrarenal collecting system. No   calculi. No mass.    Left kidney: 10.2 cm. Fullness of the intrarenal collecting system. No   calculi. No mass.    Urinary bladder: Not distended, precluding assessment for bladder wall   thickening.    Prostate enlargement is identified withvolume 84 cc.    IMPRESSION:  Increased echogenicity of the renal parenchyma suggests chronic medical   renal disease. Contour lobulation of the renal parenchyma noted   bilaterally. Fullness of the intrarenal collecting systems. Prostatic   enlargement.      < end of copied text >        RADIOLOGY & ADDITIONAL TESTS:    Imaging Personally Reviewed:    Consultant(s) Notes Reviewed:      Care Discussed with Consultants/Other Providers:  
DATE OF SERVICE: 06-06-23 @ 14:07    Patient is a 72y old  Male who presents with a chief complaint of PAD (05 Jun 2023 17:50)      INTERVAL HISTORY: Feels ok.     REVIEW OF SYSTEMS:  CONSTITUTIONAL: No weakness  EYES/ENT: No visual changes;  No throat pain   NECK: No pain or stiffness  RESPIRATORY: No cough, wheezing; No shortness of breath  CARDIOVASCULAR: No chest pain or palpitations  GASTROINTESTINAL: No abdominal  pain. No nausea, vomiting, or hematemesis  GENITOURINARY: No dysuria, frequency or hematuria  NEUROLOGICAL: No stroke like symptoms  SKIN: No rashes    TELEMETRY Personally reviewed: V-Paced/SR   	  MEDICATIONS:  metoprolol succinate ER 50 milliGRAM(s) Oral daily        PHYSICAL EXAM:  T(C): 36.6 (06-06-23 @ 12:13), Max: 36.6 (06-05-23 @ 20:25)  HR: 68 (06-06-23 @ 12:13) (68 - 104)  BP: 155/86 (06-06-23 @ 12:13) (153/78 - 178/91)  RR: 18 (06-06-23 @ 12:13) (18 - 18)  SpO2: 100% (06-06-23 @ 12:13) (98% - 100%)  Wt(kg): --  I&O's Summary    05 Jun 2023 07:01  -  06 Jun 2023 07:00  --------------------------------------------------------  IN: 870 mL / OUT: 1860 mL / NET: -990 mL          Appearance: In no distress	  HEENT:    PERRL, EOMI	  Cardiovascular:  S1 S2, No JVD  Respiratory: Lungs clear to auscultation	  Gastrointestinal:  Soft, Non-tender, + BS	  Vascularature:  No edema of LE  Psychiatric: Appropriate affect   Neuro: no acute focal deficits                               9.6    5.99  )-----------( 138      ( 06 Jun 2023 01:35 )             28.4     06-06    144  |  113<H>  |  39<H>  ----------------------------<  160<H>  4.6   |  19<L>  |  2.38<H>    Ca    8.7      06 Jun 2023 01:35  Phos  3.1     06-06  Mg     1.3     06-06          Labs personally reviewed      ASSESSMENT/PLAN:   	  71 y/o M--patient with a history of type 2 DM complicated with CKD4, PAD with unclear past stenting hx in the RLE, essential HTN tobacco use,  transferred from [facility] 3/30/23 with spouse reporting the patient had lost his R hallux toenail about a week prior to that admission and noted dark discoloration of the R hallux and 2nd toe with foul odor emanating from the R foot    1. Pre-op risk stratification pods surgery   - S/p peripheral angio 4/7/2023 revealing  of SFA bilaterally s/p DCBA and stenting of R SFA.   - Pt displays no evidence of coronary ischemia  - TTE shows normal LV systolic function with no significant structural abnormalities.   - From a cardiac standpoint, Pt is optimized for pods intervention or LE angiogram  - Pt is intermediate to low- moderate risk for desired procedure    2. PAD  - Continue Cilostazol, and DAPT therapy  - Statin therapy  - Vascular input appreciated    3. CKD  - Renal input appreciated    4. CAD  - S/p PCI   -Continue Plavix, statin and BB                Lalitha Jefferson, SUHA-DELICIA Liu DO [facility]  Cardiovascular Medicine  [address] [place] [address], [address]  Available through call or text on [employer] TE[employer]  Office: [phone]  
DATE OF SERVICE: 06-08-23 @ 10:31    Patient is a 72y old  Male who presents with a chief complaint of PAD (05 Jun 2023 17:50)      INTERVAL HISTORY: Feels ok.     REVIEW OF SYSTEMS:  CONSTITUTIONAL: No weakness  EYES/ENT: No visual changes;  No throat pain   NECK: No pain or stiffness  RESPIRATORY: No cough, wheezing; No shortness of breath  CARDIOVASCULAR: No chest pain or palpitations  GASTROINTESTINAL: No abdominal  pain. No nausea, vomiting, or hematemesis  GENITOURINARY: No dysuria, frequency or hematuria  NEUROLOGICAL: No stroke like symptoms  SKIN: No rashes    TELEMETRY Personally reviewed: SR/ST   	  MEDICATIONS:  hydrALAZINE Injectable 10 milliGRAM(s) IV Push every 30 minutes PRN  labetalol Injectable 20 milliGRAM(s) IV Push every 30 minutes PRN        PHYSICAL EXAM:  T(C): 36.4 (06-08-23 @ 04:32), Max: 36.6 (06-07-23 @ 11:21)  HR: 91 (06-08-23 @ 04:32) (52 - 91)  BP: 133/88 (06-08-23 @ 04:32) (125/58 - 188/90)  RR: 18 (06-08-23 @ 04:32) (15 - 18)  SpO2: 98% (06-08-23 @ 04:32) (98% - 100%)  Wt(kg): --  I&O's Summary    07 Jun 2023 07:01  -  08 Jun 2023 07:00  --------------------------------------------------------  IN: 240 mL / OUT: 0 mL / NET: 240 mL      Height (cm): 172.7 (06-07 @ 11:21)  Weight (kg): 66.678 (06-07 @ 11:21)  BMI (kg/m2): 22.4 (06-07 @ 11:21)  BSA (m2): 1.79 (06-07 @ 11:21)    Appearance: In no distress	  HEENT:    PERRL, EOMI	  Cardiovascular:  S1 S2, No JVD  Respiratory: Lungs clear to auscultation	  Gastrointestinal:  Soft, Non-tender, + BS	  Vascularature:  No edema of LE  Psychiatric: Appropriate affect   Neuro: no acute focal deficits                               10.7   10.49 )-----------( 159      ( 08 Jun 2023 06:09 )             31.4     06-08    142  |  109<H>  |  42<H>  ----------------------------<  118<H>  5.4<H>   |  19<L>  |  2.50<H>    Ca    9.0      08 Jun 2023 06:09  Phos  3.2     06-07  Mg     1.5     06-07          Labs personally reviewed      ASSESSMENT/PLAN: 	    73 y/o M--patient with a history of type 2 DM complicated with CKD4, PAD with unclear past stenting hx in the RLE, essential HTN tobacco use,  transferred from [facility] 3/30/23 with spouse reporting the patient had lost his R hallux toenail about a week prior to that admission and noted dark discoloration of the R hallux and 2nd toe with foul odor emanating from the R foot    1. Pre-op risk stratification pods surgery   - S/p peripheral angio 4/7/2023 revealing  of SFA bilaterally s/p DCBA and stenting of R SFA.   - Pt displays no evidence of coronary ischemia  - TTE shows normal LV systolic function with no significant structural abnormalities.   - From a cardiac standpoint, Pt is optimized for pods intervention or LE angiogram  - Pt is intermediate to low- moderate risk for desired procedure  - Tolerated procedure well.    2. PAD  - Continue Cilostazol, and DAPT therapy  - Statin therapy  - Vascular input appreciated  - s/p RLE angiogram: R SFA stent patent, angioplasty of pop and PT    3. CKD  - Renal input appreciated    4. CAD  - S/p PCI   -Continue Plavix, statin and BB          Lalitha Jefferson, AG-NP   Cedric Liu DO [facility]  Cardiovascular Medicine  [address], [address]  Available through call or text on [employer] TEAM[employer]  Office: [phone]  
Patient is a 72y old  Male who presents with a chief complaint of PAD (05 Jun 2023 17:50)       INTERVAL HPI/OVERNIGHT EVENTS:  Patient seen and evaluated at bedside.  Pt is resting comfortable in NAD. Denies N/V/F/C.  Pain rated at X/10    Allergies    penicillin (Unknown)    Intolerances        Vital Signs Last 24 Hrs  T(C): 36.4 (07 Jun 2023 08:50), Max: 36.6 (06 Jun 2023 12:13)  T(F): 97.6 (07 Jun 2023 04:28), Max: 97.9 (06 Jun 2023 12:13)  HR: 82 (07 Jun 2023 08:50) (68 - 82)  BP: 178/84 (07 Jun 2023 08:50) (155/86 - 178/84)  BP(mean): --  RR: 18 (07 Jun 2023 08:50) (18 - 18)  SpO2: 100% (07 Jun 2023 08:50) (100% - 100%)    Parameters below as of 07 Jun 2023 04:28  Patient On (Oxygen Delivery Method): room air        LABS:                        9.9    6.68  )-----------( 122      ( 07 Jun 2023 01:29 )             29.0     06-07    142  |  111<H>  |  38<H>  ----------------------------<  131<H>  4.6   |  20<L>  |  2.32<H>    Ca    8.9      07 Jun 2023 01:29  Phos  3.2     06-07  Mg     1.5     06-07      PT/INR - ( 07 Jun 2023 01:29 )   PT: 13.1 sec;   INR: 1.14 ratio         PTT - ( 07 Jun 2023 01:29 )  PTT:31.0 sec    CAPILLARY BLOOD GLUCOSE      POCT Blood Glucose.: 129 mg/dL (07 Jun 2023 07:43)  POCT Blood Glucose.: 167 mg/dL (06 Jun 2023 21:27)  POCT Blood Glucose.: 102 mg/dL (06 Jun 2023 17:24)  POCT Blood Glucose.: 195 mg/dL (06 Jun 2023 11:57)      Lower Extremity Physical Exam:    Vascular: DP/PT 0/4, B/L, CFT <3 seconds B/L, Temperature gradient warm to cool, B/L.   Neuro: Epicritic sensation diminished to the level of the toes, B/L.  Musculoskeletal/Ortho: s/p bilateral partial 1st and 2nd ray resection (DOS 4/12/23)  Skin: 4/12 s/p bilateral partial first and second ray resection dehisced wounds with well adhered dry eschar, sutures intact, no erythema, no edema, no drainage, no pus, not acute signs of infection.       RADIOLOGY & ADDITIONAL TESTS:  
VASCULAR SURGERY DAILY PROGRESS NOTE:     SUBJECTIVE/ROS:   Overnight: no acute events   Patient seen and evaluated on AM rounds.      OBJECTIVE:  Vital Signs Last 24 Hrs  T(C): 36.4 (08 Jun 2023 04:32), Max: 36.6 (07 Jun 2023 11:21)  T(F): 97.6 (08 Jun 2023 04:32), Max: 97.9 (07 Jun 2023 11:21)  HR: 91 (08 Jun 2023 04:32) (52 - 91)  BP: 133/88 (08 Jun 2023 04:32) (125/58 - 188/90)  BP(mean): 107 (07 Jun 2023 17:30) (84 - 121)  RR: 18 (08 Jun 2023 04:32) (15 - 18)  SpO2: 98% (08 Jun 2023 04:32) (98% - 100%)    Parameters below as of 08 Jun 2023 04:32  Patient On (Oxygen Delivery Method): room air      I&O's Detail    07 Jun 2023 07:01  -  08 Jun 2023 07:00  --------------------------------------------------------  IN:    Oral Fluid: 240 mL  Total IN: 240 mL    OUT:  Total OUT: 0 mL    Total NET: 240 mL        Daily Height in cm: 172.7 (07 Jun 2023 11:21)    Daily   MEDICATIONS  (STANDING):  aspirin 325 milliGRAM(s) Oral daily  atorvastatin 40 milliGRAM(s) Oral at bedtime  chlorhexidine 2% Cloths 1 Application(s) Topical daily  cilostazol 100 milliGRAM(s) Oral two times a day  clopidogrel Tablet 75 milliGRAM(s) Oral daily  dextrose 50% Injectable 25 Gram(s) IV Push once  dextrose 50% Injectable 12.5 Gram(s) IV Push once  dextrose 50% Injectable 25 Gram(s) IV Push once  glucagon  Injectable 1 milliGRAM(s) IntraMuscular once  heparin   Injectable 5000 Unit(s) SubCutaneous every 8 hours  insulin lispro (ADMELOG) corrective regimen sliding scale   SubCutaneous three times a day before meals  insulin lispro (ADMELOG) corrective regimen sliding scale   SubCutaneous at bedtime  sodium bicarbonate 1300 milliGRAM(s) Oral two times a day    MEDICATIONS  (PRN):  dextrose Oral Gel 15 Gram(s) Oral once PRN Blood Glucose LESS THAN 70 milliGRAM(s)/deciliter  hydrALAZINE Injectable 10 milliGRAM(s) IV Push every 30 minutes PRN SBP >180, Pulse <65  labetalol Injectable 20 milliGRAM(s) IV Push every 30 minutes PRN SBP >180, Pulse >65      Labs:                        10.7   10.49 )-----------( 159      ( 08 Jun 2023 06:09 )             31.4     06-08    142  |  109<H>  |  42<H>  ----------------------------<  118<H>  5.4<H>   |  19<L>  |  2.50<H>    Ca    9.0      08 Jun 2023 06:09  Phos  3.2     06-07  Mg     1.5     06-07      PT/INR - ( 07 Jun 2023 01:29 )   PT: 13.1 sec;   INR: 1.14 ratio         PTT - ( 07 Jun 2023 01:29 )  PTT:31.0 sec            Physical Exam:  GEN: NAD  RESP: RA  ABD: soft, non distended, non tender   EXTR: 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 Palpable PT pulses bilaterally. Left groin soft, no evidence of hematoma.       
24h Events:  No acute events overnight.    Subjective:   Patient seen at bedside this AM.     Objective:  Vital Signs  T(C): 36.4 (06-07 @ 08:50), Max: 36.6 (06-06 @ 12:13)  HR: 82 (06-07 @ 08:50) (68 - 82)  BP: 178/84 (06-07 @ 08:50) (155/86 - 178/84)  RR: 18 (06-07 @ 08:50) (18 - 18)  SpO2: 100% (06-07 @ 08:50) (100% - 100%)      Physical Exam:  GEN: NAD  RESP: RA  ABD: soft, non distended, non tender   EXTR: 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 Palpable PT pulses bilaterally, DP signals present, R stronger than L. No motor or sensory deficits.   NEURO: A/ox4    Labs:                        9.9    6.68  )-----------( 122      ( 07 Jun 2023 01:29 )             29.0   06-07    142  |  111<H>  |  38<H>  ----------------------------<  131<H>  4.6   |  20<L>  |  2.32<H>    Ca    8.9      07 Jun 2023 01:29  Phos  3.2     06-07  Mg     1.5     06-07      CAPILLARY BLOOD GLUCOSE      POCT Blood Glucose.: 129 mg/dL (07 Jun 2023 07:43)  POCT Blood Glucose.: 167 mg/dL (06 Jun 2023 21:27)  POCT Blood Glucose.: 102 mg/dL (06 Jun 2023 17:24)  POCT Blood Glucose.: 195 mg/dL (06 Jun 2023 11:57)      Imaging:    
24h Events:  No acute events overnight.    Subjective:   Patient seen at bedside this AM. W/o complains. Aware and NPO for Angio.     Objective:  Vital Signs  T(C): 36.5 (06-06 @ 04:36), Max: 36.7 (06-05 @ 11:44)  HR: 86 (06-06 @ 04:36) (75 - 104)  BP: 160/91 (06-06 @ 04:36) (142/79 - 178/91)  RR: 18 (06-06 @ 04:36) (18 - 18)  SpO2: 100% (06-06 @ 04:36) (98% - 100%)  06-05-23 @ 07:01  -  06-06-23 @ 07:00  --------------------------------------------------------  IN:  Total IN: 0 mL    OUT:    Voided (mL): 1860 mL  Total OUT: 1860 mL    Total NET: -1860 mL          Physical Exam:  GEN: NAD  RESP: RA  ABD: soft, non distended, non tender   EXTR: Extremities: 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 Palpable PT pulses bilaterally, DP signals present, R stronger than L. No motor or sensory deficits.   NEURO: A/ox4    Labs:                        9.6    5.99  )-----------( 138      ( 06 Jun 2023 01:35 )             28.4   06-06    144  |  113<H>  |  39<H>  ----------------------------<  160<H>  4.6   |  19<L>  |  2.38<H>    Ca    8.7      06 Jun 2023 01:35  Phos  3.1     06-06  Mg     1.3     06-06      CAPILLARY BLOOD GLUCOSE      POCT Blood Glucose.: 125 mg/dL (06 Jun 2023 07:56)  POCT Blood Glucose.: 178 mg/dL (06 Jun 2023 00:00)  POCT Blood Glucose.: 194 mg/dL (05 Jun 2023 21:24)  POCT Blood Glucose.: 101 mg/dL (05 Jun 2023 17:09)  POCT Blood Glucose.: 182 mg/dL (05 Jun 2023 11:56)      Imaging:    
DATE OF SERVICE: 06-07-23 @ 17:32    Patient is a 72y old  Male who presents with a chief complaint of PAD (05 Jun 2023 17:50)      INTERVAL HISTORY: Feels ok.     REVIEW OF SYSTEMS:  CONSTITUTIONAL: No weakness  EYES/ENT: No visual changes;  No throat pain   NECK: No pain or stiffness  RESPIRATORY: No cough, wheezing; No shortness of breath  CARDIOVASCULAR: No chest pain or palpitations  GASTROINTESTINAL: No abdominal  pain. No nausea, vomiting, or hematemesis  GENITOURINARY: No dysuria, frequency or hematuria  NEUROLOGICAL: No stroke like symptoms  SKIN: No rashes    TELEMETRY Personally reviewed: SR  	  MEDICATIONS:  hydrALAZINE Injectable 10 milliGRAM(s) IV Push every 30 minutes PRN  labetalol Injectable 20 milliGRAM(s) IV Push every 30 minutes PRN        PHYSICAL EXAM:  T(C): 36.4 (06-07-23 @ 15:00), Max: 36.6 (06-06-23 @ 20:46)  HR: 60 (06-07-23 @ 17:00) (52 - 82)  BP: 167/75 (06-07-23 @ 17:00) (125/58 - 188/90)  RR: 15 (06-07-23 @ 17:00) (15 - 18)  SpO2: 100% (06-07-23 @ 17:00) (98% - 100%)  Wt(kg): --  I&O's Summary    06 Jun 2023 07:01  -  07 Jun 2023 07:00  --------------------------------------------------------  IN: 2350 mL / OUT: 0 mL / NET: 2350 mL      Height (cm): 172.7 (06-07 @ 11:21)  Weight (kg): 66.678 (06-07 @ 11:21)  BMI (kg/m2): 22.4 (06-07 @ 11:21)  BSA (m2): 1.79 (06-07 @ 11:21)    Appearance: In no distress	  HEENT:    PERRL, EOMI	  Cardiovascular:  S1 S2, No JVD  Respiratory: Lungs clear to auscultation	  Gastrointestinal:  Soft, Non-tender, + BS	  Vascularature:  No edema of LE  Psychiatric: Appropriate affect   Neuro: no acute focal deficits                               9.9    6.68  )-----------( 122      ( 07 Jun 2023 01:29 )             29.0     06-07    142  |  111<H>  |  38<H>  ----------------------------<  131<H>  4.6   |  20<L>  |  2.32<H>    Ca    8.9      07 Jun 2023 01:29  Phos  3.2     06-07  Mg     1.5     06-07          Labs personally reviewed      ASSESSMENT/PLAN: 	    73 y/o M--patient with a history of type 2 DM complicated with CKD4, PAD with unclear past stenting hx in the RLE, essential HTN tobacco use,  transferred from [facility] 3/30/23 with spouse reporting the patient had lost his R hallux toenail about a week prior to that admission and noted dark discoloration of the R hallux and 2nd toe with foul odor emanating from the R foot    1. Pre-op risk stratification pods surgery   - S/p peripheral angio 4/7/2023 revealing  of SFA bilaterally s/p DCBA and stenting of R SFA.   - Pt displays no evidence of coronary ischemia  - TTE shows normal LV systolic function with no significant structural abnormalities.   - From a cardiac standpoint, Pt is optimized for pods intervention or LE angiogram  - Pt is intermediate to low- moderate risk for desired procedure  - Tolerated surgery well.     2. PAD  - Continue Cilostazol, and DAPT therapy  - Statin therapy  - Vascular input appreciated    3. CKD  - Renal input appreciated    4. CAD  - S/p PCI   -Continue Plavix, statin and BB            Lalitha Jefferson, SUHA-NP   Cedric Liu DO [facility]  Cardiovascular Medicine  [address], [address]  Available through call or text on [employer]  Office: [phone]  
DATE OF SERVICE: 06-08-23 @ 13:13    Patient is a 72y old  Male who presents with a chief complaint of PAD (05 Jun 2023 17:50)      SUBJECTIVE / OVERNIGHT EVENTS:  No chest pain. No shortness of breath. No complaints. No events overnight.     MEDICATIONS  (STANDING):  aspirin 325 milliGRAM(s) Oral daily  atorvastatin 40 milliGRAM(s) Oral at bedtime  chlorhexidine 2% Cloths 1 Application(s) Topical daily  cilostazol 100 milliGRAM(s) Oral two times a day  clopidogrel Tablet 75 milliGRAM(s) Oral daily  dextrose 50% Injectable 12.5 Gram(s) IV Push once  dextrose 50% Injectable 25 Gram(s) IV Push once  dextrose 50% Injectable 25 Gram(s) IV Push once  glucagon  Injectable 1 milliGRAM(s) IntraMuscular once  heparin   Injectable 5000 Unit(s) SubCutaneous every 8 hours  insulin lispro (ADMELOG) corrective regimen sliding scale   SubCutaneous three times a day before meals  insulin lispro (ADMELOG) corrective regimen sliding scale   SubCutaneous at bedtime  sodium bicarbonate 1300 milliGRAM(s) Oral two times a day    MEDICATIONS  (PRN):  dextrose Oral Gel 15 Gram(s) Oral once PRN Blood Glucose LESS THAN 70 milliGRAM(s)/deciliter  hydrALAZINE Injectable 10 milliGRAM(s) IV Push every 30 minutes PRN SBP >180, Pulse <65  labetalol Injectable 20 milliGRAM(s) IV Push every 30 minutes PRN SBP >180, Pulse >65      Vital Signs Last 24 Hrs  T(C): 36.6 (08 Jun 2023 11:20), Max: 36.6 (08 Jun 2023 11:20)  T(F): 97.8 (08 Jun 2023 11:20), Max: 97.8 (08 Jun 2023 11:20)  HR: 130 (08 Jun 2023 11:20) (52 - 130)  BP: 100/69 (08 Jun 2023 11:20) (100/69 - 178/84)  BP(mean): 107 (07 Jun 2023 17:30) (84 - 121)  RR: 18 (08 Jun 2023 11:20) (15 - 18)  SpO2: 97% (08 Jun 2023 11:20) (97% - 100%)    Parameters below as of 08 Jun 2023 11:20  Patient On (Oxygen Delivery Method): room air      CAPILLARY BLOOD GLUCOSE      POCT Blood Glucose.: 237 mg/dL (08 Jun 2023 11:46)  POCT Blood Glucose.: 124 mg/dL (08 Jun 2023 07:48)  POCT Blood Glucose.: 184 mg/dL (07 Jun 2023 21:05)  POCT Blood Glucose.: 134 mg/dL (07 Jun 2023 17:07)  POCT Blood Glucose.: 124 mg/dL (07 Jun 2023 13:41)    I&O's Summary    07 Jun 2023 07:01  -  08 Jun 2023 07:00  --------------------------------------------------------  IN: 240 mL / OUT: 0 mL / NET: 240 mL        PHYSICAL EXAM:  GENERAL: NAD, well-developed  HEAD:  Atraumatic, Normocephalic  EYES: EOMI, PERRLA, conjunctiva and sclera clear  NECK: Supple, No JVD  CHEST/LUNG: Clear to auscultation bilaterally; No wheeze  HEART: Regular rate and rhythm; No murmurs, rubs, or gallops  ABDOMEN: Soft, Nontender, Nondistended; Bowel sounds present  EXTREMITIES:  2+ Peripheral Pulses, No clubbing, cyanosis, or edema  PSYCH: AAOx3  NEUROLOGY: non-focal  SKIN: No rashes or lesions    LABS:                        10.7   10.49 )-----------( 159      ( 08 Jun 2023 06:09 )             31.4     06-08    142  |  109<H>  |  42<H>  ----------------------------<  118<H>  5.4<H>   |  19<L>  |  2.50<H>    Ca    9.0      08 Jun 2023 06:09  Phos  3.2     06-07  Mg     1.5     06-07      PT/INR - ( 07 Jun 2023 01:29 )   PT: 13.1 sec;   INR: 1.14 ratio         PTT - ( 07 Jun 2023 01:29 )  PTT:31.0 sec          RADIOLOGY & ADDITIONAL TESTS:    Imaging Personally Reviewed:    Consultant(s) Notes Reviewed:      Care Discussed with Consultants/Other Providers:  
DATE OF SERVICE: 23 @ 10:56    Patient is a 72y old  Male who presents with a chief complaint of     SUBJECTIVE / OVERNIGHT EVENTS:  No chest pain. No shortness of breath. No complaints. No events overnight.     MEDICATIONS  (STANDING):  aspirin 325 milliGRAM(s) Oral daily  atorvastatin 40 milliGRAM(s) Oral at bedtime  chlorhexidine 2% Cloths 1 Application(s) Topical daily  cilostazol 100 milliGRAM(s) Oral two times a day  clopidogrel Tablet 75 milliGRAM(s) Oral daily  dextrose 5%. 1000 milliLiter(s) (50 mL/Hr) IV Continuous <Continuous>  dextrose 5%. 1000 milliLiter(s) (100 mL/Hr) IV Continuous <Continuous>  dextrose 50% Injectable 12.5 Gram(s) IV Push once  dextrose 50% Injectable 25 Gram(s) IV Push once  dextrose 50% Injectable 25 Gram(s) IV Push once  glucagon  Injectable 1 milliGRAM(s) IntraMuscular once  heparin   Injectable 5000 Unit(s) SubCutaneous every 8 hours  insulin lispro (ADMELOG) corrective regimen sliding scale   SubCutaneous at bedtime  insulin lispro (ADMELOG) corrective regimen sliding scale   SubCutaneous three times a day before meals  lactated ringers. 500 milliLiter(s) (125 mL/Hr) IV Continuous <Continuous>  metoprolol succinate ER 50 milliGRAM(s) Oral daily  sodium bicarbonate 1300 milliGRAM(s) Oral two times a day    MEDICATIONS  (PRN):  dextrose Oral Gel 15 Gram(s) Oral once PRN Blood Glucose LESS THAN 70 milliGRAM(s)/deciliter      Vital Signs Last 24 Hrs  T(C): 36.8 (2023 04:30), Max: 36.8 (2023 12:04)  T(F): 98.3 (2023 04:30), Max: 98.3 (2023 12:04)  HR: 83 (2023 04:30) (66 - 83)  BP: 162/72 (2023 04:30) (119/81 - 167/89)  BP(mean): --  RR: 18 (2023 04:30) (18 - 18)  SpO2: 100% (2023 04:30) (100% - 100%)    Parameters below as of 2023 04:30  Patient On (Oxygen Delivery Method): room air      CAPILLARY BLOOD GLUCOSE      POCT Blood Glucose.: 104 mg/dL (2023 07:48)  POCT Blood Glucose.: 185 mg/dL (2023 21:33)  POCT Blood Glucose.: 118 mg/dL (2023 17:38)  POCT Blood Glucose.: 198 mg/dL (2023 11:56)    I&O's Summary    2023 07:01  -  2023 07:00  --------------------------------------------------------  IN: 2460 mL / OUT: 300 mL / NET: 2160 mL        PHYSICAL EXAM:  GENERAL: NAD, well-developed  HEAD:  Atraumatic, Normocephalic  EYES: EOMI, PERRLA, conjunctiva and sclera clear  NECK: Supple, No JVD  CHEST/LUNG: Clear to auscultation bilaterally; No wheeze  HEART: Regular rate and rhythm; No murmurs, rubs, or gallops  ABDOMEN: Soft, Nontender, Nondistended; Bowel sounds present  EXTREMITIES:  2+ Peripheral Pulses, No clubbing, cyanosis, or edema  PSYCH: AAOx3  NEUROLOGY: non-focal  SKIN: No rashes or lesions    LABS:                        10.8   6.03  )-----------( 170      ( 2023 06:25 )             32.8     06-04    142  |  113<H>  |  52<H>  ----------------------------<  112<H>  5.0   |  18<L>  |  2.85<H>    Ca    8.9      2023 06:23  Phos  3.3     06-03  Mg     2.0     06-03    TPro  7.8  /  Alb  4.2  /  TBili  0.3  /  DBili  x   /  AST  10  /  ALT  10  /  AlkPhos  93  06-02    PT/INR - ( 2023 12:57 )   PT: 12.8 sec;   INR: 1.11 ratio         PTT - ( 2023 12:57 )  PTT:27.5 sec      Urinalysis Basic - ( 2023 01:07 )    Color: Colorless / Appearance: Clear / S.011 / pH: x  Gluc: x / Ketone: Negative  / Bili: Negative / Urobili: Negative   Blood: x / Protein: Negative / Nitrite: Negative   Leuk Esterase: Negative / RBC: x / WBC x   Sq Epi: x / Non Sq Epi: x / Bacteria: x        RADIOLOGY & ADDITIONAL TESTS:    Imaging Personally Reviewed:    Consultant(s) Notes Reviewed:      Care Discussed with Consultants/Other Providers:  
Patient is a 72y old  Male who presents with a chief complaint of      INTERVAL HPI/OVERNIGHT EVENTS:  Patient seen and evaluated at bedside.  Pt is resting comfortable in NAD. Denies N/V/F/C.    Allergies    penicillin (Unknown)    Intolerances        Vital Signs Last 24 Hrs  T(C): 36.7 (2023 04:30), Max: 36.7 (2023 11:11)  T(F): 98.1 (2023 04:30), Max: 98.1 (2023 20:14)  HR: 72 (2023 04:30) (72 - 96)  BP: 156/81 (2023 04:30) (145/79 - 156/81)  BP(mean): --  RR: 18 (2023 04:30) (18 - 18)  SpO2: 98% (2023 04:30) (98% - 100%)    Parameters below as of 2023 04:30  Patient On (Oxygen Delivery Method): room air        LABS:                        10.8   6.03  )-----------( 170      ( 2023 06:25 )             32.8     06-05    145  |  114<H>  |  49<H>  ----------------------------<  103<H>  4.6   |  19<L>  |  2.89<H>    Ca    8.7      2023 06:39        Urinalysis Basic - ( 2023 01:07 )    Color: Colorless / Appearance: Clear / S.011 / pH: x  Gluc: x / Ketone: Negative  / Bili: Negative / Urobili: Negative   Blood: x / Protein: Negative / Nitrite: Negative   Leuk Esterase: Negative / RBC: x / WBC x   Sq Epi: x / Non Sq Epi: x / Bacteria: x      CAPILLARY BLOOD GLUCOSE      POCT Blood Glucose.: 113 mg/dL (2023 07:43)  POCT Blood Glucose.: 162 mg/dL (2023 21:10)  POCT Blood Glucose.: 117 mg/dL (2023 17:09)  POCT Blood Glucose.: 196 mg/dL (2023 11:18)      Lower Extremity Physical Exam:  Vascular: DP/PT 0/4, B/L, CFT <3 seconds B/L, Temperature gradient warm to cool, B/L.   Neuro: Epicritic sensation diminished to the level of the toes, B/L.  Musculoskeletal/Ortho: s/p bilateral partial 1st and 2nd ray resection (DOS 23)  Skin:  s/p bilateral partial first and second ray resection dehisced wounds with well adhered dry eschar, sutures intact, no erythema, no edema, no drainage, no pus, not acute signs of infection.     RADIOLOGY & ADDITIONAL TESTS:  
[facility] Daily Progress Note  =====================================================    SUBJECTIVE: Patient seen and examined at bedside on AM rounds. NAD    --------------------------------------------------------------------------------------  OBJECTIVE:    VITAL SIGNS:  Vital Signs Last 24 Hrs  T(C): 36.8 (03 Jun 2023 12:04), Max: 36.8 (03 Jun 2023 12:04)  T(F): 98.3 (03 Jun 2023 12:04), Max: 98.3 (03 Jun 2023 12:04)  HR: 66 (03 Jun 2023 12:04) (65 - 90)  BP: 119/81 (03 Jun 2023 12:04) (119/81 - 167/78)  BP(mean): --  RR: 18 (03 Jun 2023 12:04) (17 - 18)  SpO2: 100% (03 Jun 2023 12:04) (99% - 100%)    Parameters below as of 03 Jun 2023 12:04  Patient On (Oxygen Delivery Method): room air      --------------------------------------------------------------------------------------    EXAM:  General: NAD, resting comfortably  Pulmonary: normal resp effort  Extremities: 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 Palpable PT pulses bilaterally, DP signals present, R stronger than L. No motor or sensory deficits.   Neuro: A/O x 3, CNs II-XII grossly intact, normal sensation, no focal deficits    --------------------------------------------------------------------------------------    LABS:                        11.2   6.78  )-----------( 164      ( 03 Jun 2023 07:17 )             33.5     06-03    142  |  113<H>  |  55<H>  ----------------------------<  84  6.1<H>   |  15<L>  |  3.00<H>    Ca    9.2      03 Jun 2023 07:07  Phos  3.3     06-03  Mg     2.0     06-03    TPro  7.8  /  Alb  4.2  /  TBili  0.3  /  DBili  x   /  AST  10  /  ALT  10  /  AlkPhos  93  06-02    PT/INR - ( 02 Jun 2023 12:57 )   PT: 12.8 sec;   INR: 1.11 ratio         PTT - ( 02 Jun 2023 12:57 )  PTT:27.5 sec      --------------------------------------------------------------------------------------    INS AND OUTS:    06-03-23 @ 07:01  -  06-03-23 @ 12:25  --------------------------------------------------------  IN: 240 mL / OUT: 0 mL / NET: 240 mL        --------------------------------------------------------------------------------------    MEDICATIONS:  MEDICATIONS  (STANDING):  aspirin 325 milliGRAM(s) Oral daily  atorvastatin 40 milliGRAM(s) Oral at bedtime  chlorhexidine 2% Cloths 1 Application(s) Topical daily  cilostazol 100 milliGRAM(s) Oral two times a day  clopidogrel Tablet 75 milliGRAM(s) Oral daily  dextrose 5%. 1000 milliLiter(s) (50 mL/Hr) IV Continuous <Continuous>  dextrose 5%. 1000 milliLiter(s) (100 mL/Hr) IV Continuous <Continuous>  dextrose 50% Injectable 12.5 Gram(s) IV Push once  dextrose 50% Injectable 25 Gram(s) IV Push once  dextrose 50% Injectable 25 Gram(s) IV Push once  glucagon  Injectable 1 milliGRAM(s) IntraMuscular once  heparin   Injectable 5000 Unit(s) SubCutaneous every 8 hours  insulin lispro (ADMELOG) corrective regimen sliding scale   SubCutaneous three times a day before meals  insulin lispro (ADMELOG) corrective regimen sliding scale   SubCutaneous at bedtime  metoprolol succinate ER 50 milliGRAM(s) Oral daily    MEDICATIONS  (PRN):  dextrose Oral Gel 15 Gram(s) Oral once PRN Blood Glucose LESS THAN 70 milliGRAM(s)/deciliter    --------------------------------------------------------------------------------------  
DATE OF SERVICE: 06-06-23 @ 09:59    Patient is a 72y old  Male who presents with a chief complaint of PAD (05 Jun 2023 17:50)      SUBJECTIVE / OVERNIGHT EVENTS:  No chest pain. No shortness of breath. No complaints. No events overnight.     MEDICATIONS  (STANDING):  aspirin 325 milliGRAM(s) Oral daily  atorvastatin 40 milliGRAM(s) Oral at bedtime  chlorhexidine 2% Cloths 1 Application(s) Topical daily  cilostazol 100 milliGRAM(s) Oral two times a day  clopidogrel Tablet 75 milliGRAM(s) Oral daily  dextrose 5%. 1000 milliLiter(s) (100 mL/Hr) IV Continuous <Continuous>  dextrose 5%. 1000 milliLiter(s) (50 mL/Hr) IV Continuous <Continuous>  dextrose 50% Injectable 12.5 Gram(s) IV Push once  dextrose 50% Injectable 25 Gram(s) IV Push once  dextrose 50% Injectable 25 Gram(s) IV Push once  glucagon  Injectable 1 milliGRAM(s) IntraMuscular once  heparin   Injectable 5000 Unit(s) SubCutaneous every 8 hours  insulin lispro (ADMELOG) corrective regimen sliding scale   SubCutaneous at bedtime  insulin lispro (ADMELOG) corrective regimen sliding scale   SubCutaneous three times a day before meals  lactated ringers. 500 milliLiter(s) (125 mL/Hr) IV Continuous <Continuous>  magnesium sulfate  IVPB 2 Gram(s) IV Intermittent once  metoprolol succinate ER 50 milliGRAM(s) Oral daily  sodium bicarbonate 1300 milliGRAM(s) Oral two times a day    MEDICATIONS  (PRN):  dextrose Oral Gel 15 Gram(s) Oral once PRN Blood Glucose LESS THAN 70 milliGRAM(s)/deciliter      Vital Signs Last 24 Hrs  T(C): 36.5 (06 Jun 2023 04:36), Max: 36.7 (05 Jun 2023 11:44)  T(F): 97.7 (06 Jun 2023 04:36), Max: 98 (05 Jun 2023 11:44)  HR: 86 (06 Jun 2023 04:36) (75 - 104)  BP: 160/91 (06 Jun 2023 04:36) (142/79 - 178/91)  BP(mean): --  RR: 18 (06 Jun 2023 04:36) (18 - 18)  SpO2: 100% (06 Jun 2023 04:36) (98% - 100%)    Parameters below as of 06 Jun 2023 04:36  Patient On (Oxygen Delivery Method): room air      CAPILLARY BLOOD GLUCOSE      POCT Blood Glucose.: 125 mg/dL (06 Jun 2023 07:56)  POCT Blood Glucose.: 178 mg/dL (06 Jun 2023 00:00)  POCT Blood Glucose.: 194 mg/dL (05 Jun 2023 21:24)  POCT Blood Glucose.: 101 mg/dL (05 Jun 2023 17:09)  POCT Blood Glucose.: 182 mg/dL (05 Jun 2023 11:56)    I&O's Summary    05 Jun 2023 07:01  -  06 Jun 2023 07:00  --------------------------------------------------------  IN: 870 mL / OUT: 1860 mL / NET: -990 mL        PHYSICAL EXAM:  GENERAL: NAD, well-developed  HEAD:  Atraumatic, Normocephalic  EYES: EOMI, PERRLA, conjunctiva and sclera clear  NECK: Supple, No JVD  CHEST/LUNG: Clear to auscultation bilaterally; No wheeze  HEART: Regular rate and rhythm; No murmurs, rubs, or gallops  ABDOMEN: Soft, Nontender, Nondistended; Bowel sounds present  EXTREMITIES:  2+ Peripheral Pulses, No clubbing, cyanosis, or edema  PSYCH: AAOx3  NEUROLOGY: non-focal  SKIN: No rashes or lesions    LABS:                        9.6    5.99  )-----------( 138      ( 06 Jun 2023 01:35 )             28.4     06-06    144  |  113<H>  |  39<H>  ----------------------------<  160<H>  4.6   |  19<L>  |  2.38<H>    Ca    8.7      06 Jun 2023 01:35  Phos  3.1     06-06  Mg     1.3     06-06      PT/INR - ( 06 Jun 2023 01:35 )   PT: 12.7 sec;   INR: 1.09 ratio         PTT - ( 06 Jun 2023 01:35 )  PTT:30.8 sec          RADIOLOGY & ADDITIONAL TESTS:    Imaging Personally Reviewed:    Consultant(s) Notes Reviewed:      Care Discussed with Consultants/Other Providers:  
DATE OF SERVICE: 06-07-23 @ 22:54    Patient is a 72y old  Male who presents with a chief complaint of PAD (05 Jun 2023 17:50)      SUBJECTIVE / OVERNIGHT EVENTS:  No chest pain. No shortness of breath. No complaints. No events overnight.     MEDICATIONS  (STANDING):  aspirin 325 milliGRAM(s) Oral daily  atorvastatin 40 milliGRAM(s) Oral at bedtime  chlorhexidine 2% Cloths 1 Application(s) Topical daily  cilostazol 100 milliGRAM(s) Oral two times a day  clopidogrel Tablet 75 milliGRAM(s) Oral daily  dextrose 50% Injectable 12.5 Gram(s) IV Push once  dextrose 50% Injectable 25 Gram(s) IV Push once  dextrose 50% Injectable 25 Gram(s) IV Push once  glucagon  Injectable 1 milliGRAM(s) IntraMuscular once  heparin   Injectable 5000 Unit(s) SubCutaneous every 8 hours  insulin lispro (ADMELOG) corrective regimen sliding scale   SubCutaneous three times a day before meals  insulin lispro (ADMELOG) corrective regimen sliding scale   SubCutaneous at bedtime  sodium bicarbonate 1300 milliGRAM(s) Oral two times a day    MEDICATIONS  (PRN):  dextrose Oral Gel 15 Gram(s) Oral once PRN Blood Glucose LESS THAN 70 milliGRAM(s)/deciliter  hydrALAZINE Injectable 10 milliGRAM(s) IV Push every 30 minutes PRN SBP >180, Pulse <65  labetalol Injectable 20 milliGRAM(s) IV Push every 30 minutes PRN SBP >180, Pulse >65      Vital Signs Last 24 Hrs  T(C): 36.4 (07 Jun 2023 20:49), Max: 36.6 (07 Jun 2023 11:21)  T(F): 97.5 (07 Jun 2023 20:49), Max: 97.9 (07 Jun 2023 11:21)  HR: 90 (07 Jun 2023 20:49) (52 - 90)  BP: 145/80 (07 Jun 2023 20:49) (125/58 - 188/90)  BP(mean): 107 (07 Jun 2023 17:30) (84 - 121)  RR: 18 (07 Jun 2023 20:49) (15 - 18)  SpO2: 100% (07 Jun 2023 20:49) (98% - 100%)    Parameters below as of 07 Jun 2023 20:49  Patient On (Oxygen Delivery Method): room air      CAPILLARY BLOOD GLUCOSE      POCT Blood Glucose.: 184 mg/dL (07 Jun 2023 21:05)  POCT Blood Glucose.: 134 mg/dL (07 Jun 2023 17:07)  POCT Blood Glucose.: 124 mg/dL (07 Jun 2023 13:41)  POCT Blood Glucose.: 129 mg/dL (07 Jun 2023 07:43)    I&O's Summary    06 Jun 2023 07:01  -  07 Jun 2023 07:00  --------------------------------------------------------  IN: 2350 mL / OUT: 0 mL / NET: 2350 mL    07 Jun 2023 07:01  -  07 Jun 2023 22:54  --------------------------------------------------------  IN: 240 mL / OUT: 0 mL / NET: 240 mL        PHYSICAL EXAM:  GENERAL: NAD, well-developed  HEAD:  Atraumatic, Normocephalic  EYES: EOMI, PERRLA, conjunctiva and sclera clear  NECK: Supple, No JVD  CHEST/LUNG: Clear to auscultation bilaterally; No wheeze  HEART: Regular rate and rhythm; No murmurs, rubs, or gallops  ABDOMEN: Soft, Nontender, Nondistended; Bowel sounds present  EXTREMITIES:  2+ Peripheral Pulses, No clubbing, cyanosis, or edema  PSYCH: AAOx3  NEUROLOGY: non-focal  SKIN: No rashes or lesions    LABS:                        9.9    6.68  )-----------( 122      ( 07 Jun 2023 01:29 )             29.0     06-07    142  |  111<H>  |  38<H>  ----------------------------<  131<H>  4.6   |  20<L>  |  2.32<H>    Ca    8.9      07 Jun 2023 01:29  Phos  3.2     06-07  Mg     1.5     06-07      PT/INR - ( 07 Jun 2023 01:29 )   PT: 13.1 sec;   INR: 1.14 ratio         PTT - ( 07 Jun 2023 01:29 )  PTT:31.0 sec          RADIOLOGY & ADDITIONAL TESTS:    Imaging Personally Reviewed:    Consultant(s) Notes Reviewed:      Care Discussed with Consultants/Other Providers:  
DATE OF SERVICE: 23 @ 17:08    Patient is a 72y old  Male who presents with a chief complaint of     SUBJECTIVE / OVERNIGHT EVENTS:  No chest pain. No shortness of breath. No complaints. No events overnight.     MEDICATIONS  (STANDING):  aspirin 325 milliGRAM(s) Oral daily  atorvastatin 40 milliGRAM(s) Oral at bedtime  chlorhexidine 2% Cloths 1 Application(s) Topical daily  cilostazol 100 milliGRAM(s) Oral two times a day  clopidogrel Tablet 75 milliGRAM(s) Oral daily  dextrose 5%. 1000 milliLiter(s) (50 mL/Hr) IV Continuous <Continuous>  dextrose 5%. 1000 milliLiter(s) (100 mL/Hr) IV Continuous <Continuous>  dextrose 50% Injectable 12.5 Gram(s) IV Push once  dextrose 50% Injectable 25 Gram(s) IV Push once  dextrose 50% Injectable 25 Gram(s) IV Push once  glucagon  Injectable 1 milliGRAM(s) IntraMuscular once  heparin   Injectable 5000 Unit(s) SubCutaneous every 8 hours  insulin lispro (ADMELOG) corrective regimen sliding scale   SubCutaneous at bedtime  insulin lispro (ADMELOG) corrective regimen sliding scale   SubCutaneous three times a day before meals  lactated ringers. 500 milliLiter(s) (125 mL/Hr) IV Continuous <Continuous>  metoprolol succinate ER 50 milliGRAM(s) Oral daily  sodium bicarbonate 1300 milliGRAM(s) Oral two times a day    MEDICATIONS  (PRN):  dextrose Oral Gel 15 Gram(s) Oral once PRN Blood Glucose LESS THAN 70 milliGRAM(s)/deciliter      Vital Signs Last 24 Hrs  T(C): 36.7 (2023 11:44), Max: 36.7 (2023 20:14)  T(F): 98 (2023 11:44), Max: 98.1 (2023 20:14)  HR: 75 (2023 17:03) (72 - 80)  BP: 142/79 (2023 11:44) (142/79 - 156/81)  BP(mean): --  RR: 18 (2023 17:03) (18 - 18)  SpO2: 100% (2023 17:03) (98% - 100%)    Parameters below as of 2023 17:03  Patient On (Oxygen Delivery Method): room air      CAPILLARY BLOOD GLUCOSE      POCT Blood Glucose.: 182 mg/dL (2023 11:56)  POCT Blood Glucose.: 113 mg/dL (2023 07:43)  POCT Blood Glucose.: 162 mg/dL (2023 21:10)  POCT Blood Glucose.: 117 mg/dL (2023 17:09)    I&O's Summary    2023 07:01  -  2023 07:00  --------------------------------------------------------  IN: 1980 mL / OUT: 2200 mL / NET: -220 mL    2023 07:01  -  2023 17:08  --------------------------------------------------------  IN: 630 mL / OUT: 0 mL / NET: 630 mL        PHYSICAL EXAM:  GENERAL: NAD, well-developed  HEAD:  Atraumatic, Normocephalic  EYES: EOMI, PERRLA, conjunctiva and sclera clear  NECK: Supple, No JVD  CHEST/LUNG: Clear to auscultation bilaterally; No wheeze  HEART: Regular rate and rhythm; No murmurs, rubs, or gallops  ABDOMEN: Soft, Nontender, Nondistended; Bowel sounds present  EXTREMITIES:  2+ Peripheral Pulses, No clubbing, cyanosis, or edema  PSYCH: AAOx3  NEUROLOGY: non-focal  SKIN: No rashes or lesions    LABS:                        10.8   6.03  )-----------( 170      ( 2023 06:25 )             32.8     06-05    145  |  114<H>  |  49<H>  ----------------------------<  103<H>  4.6   |  19<L>  |  2.89<H>    Ca    8.7      2023 06:39            Urinalysis Basic - ( 2023 01:07 )    Color: Colorless / Appearance: Clear / S.011 / pH: x  Gluc: x / Ketone: Negative  / Bili: Negative / Urobili: Negative   Blood: x / Protein: Negative / Nitrite: Negative   Leuk Esterase: Negative / RBC: x / WBC x   Sq Epi: x / Non Sq Epi: x / Bacteria: x        RADIOLOGY & ADDITIONAL TESTS:    Imaging Personally Reviewed:    Consultant(s) Notes Reviewed:      Care Discussed with Consultants/Other Providers:  
Patient is a 72y old  Male who presents with a chief complaint of PAD (05 Jun 2023 17:50)       INTERVAL HPI/OVERNIGHT EVENTS:  Patient seen and evaluated at bedside.  Pt is resting comfortable in NAD. Denies N/V/F/C.    Allergies    penicillin (Unknown)    Intolerances        Vital Signs Last 24 Hrs  T(C): 36.4 (08 Jun 2023 04:32), Max: 36.5 (07 Jun 2023 18:05)  T(F): 97.6 (08 Jun 2023 04:32), Max: 97.7 (07 Jun 2023 18:05)  HR: 91 (08 Jun 2023 04:32) (52 - 91)  BP: 133/88 (08 Jun 2023 04:32) (125/58 - 178/84)  BP(mean): 107 (07 Jun 2023 17:30) (84 - 121)  RR: 18 (08 Jun 2023 04:32) (15 - 18)  SpO2: 98% (08 Jun 2023 04:32) (98% - 100%)    Parameters below as of 08 Jun 2023 04:32  Patient On (Oxygen Delivery Method): room air        LABS:                        10.7   10.49 )-----------( 159      ( 08 Jun 2023 06:09 )             31.4     06-08    142  |  109<H>  |  42<H>  ----------------------------<  118<H>  5.4<H>   |  19<L>  |  2.50<H>    Ca    9.0      08 Jun 2023 06:09  Phos  3.2     06-07  Mg     1.5     06-07      PT/INR - ( 07 Jun 2023 01:29 )   PT: 13.1 sec;   INR: 1.14 ratio         PTT - ( 07 Jun 2023 01:29 )  PTT:31.0 sec    CAPILLARY BLOOD GLUCOSE      POCT Blood Glucose.: 124 mg/dL (08 Jun 2023 07:48)  POCT Blood Glucose.: 184 mg/dL (07 Jun 2023 21:05)  POCT Blood Glucose.: 134 mg/dL (07 Jun 2023 17:07)  POCT Blood Glucose.: 124 mg/dL (07 Jun 2023 13:41)      Lower Extremity Physical Exam:  Vascular: DP/PT 0/4, B/L, CFT <3 seconds B/L, Temperature gradient warm to cool, B/L.   Neuro: Epicritic sensation diminished to the level of the toes, B/L.  Musculoskeletal/Ortho: s/p bilateral partial 1st and 2nd ray resection (DOS 4/12/23)  Skin: 4/12 s/p bilateral partial first and second ray resection dehisced wounds with well adhered dry eschar, sutures intact, no erythema, no edema, no drainage, no pus, not acute signs of infection.     RADIOLOGY & ADDITIONAL TESTS:  
[facility] Division of Kidney Diseases & Hypertension  FOLLOW UP NOTE  [phone]--------------------------------------------------------------------------------    Chief Complaint: PRIYA on CKD    24 hour events/subjective:  Pt underwent procedure yesterday. Slight bump in Scr as expected     PAST HISTORY  --------------------------------------------------------------------------------  No significant changes to PMH, PSH, FHx, SHx, unless otherwise noted    ALLERGIES & MEDICATIONS  --------------------------------------------------------------------------------  Allergies    penicillin (Unknown)    Intolerances      Standing Inpatient Medications  aspirin 325 milliGRAM(s) Oral daily  atorvastatin 40 milliGRAM(s) Oral at bedtime  chlorhexidine 2% Cloths 1 Application(s) Topical daily  cilostazol 100 milliGRAM(s) Oral two times a day  clopidogrel Tablet 75 milliGRAM(s) Oral daily  dextrose 50% Injectable 25 Gram(s) IV Push once  dextrose 50% Injectable 12.5 Gram(s) IV Push once  dextrose 50% Injectable 25 Gram(s) IV Push once  glucagon  Injectable 1 milliGRAM(s) IntraMuscular once  heparin   Injectable 5000 Unit(s) SubCutaneous every 8 hours  insulin lispro (ADMELOG) corrective regimen sliding scale   SubCutaneous three times a day before meals  insulin lispro (ADMELOG) corrective regimen sliding scale   SubCutaneous at bedtime  sodium bicarbonate 1300 milliGRAM(s) Oral two times a day    PRN Inpatient Medications  dextrose Oral Gel 15 Gram(s) Oral once PRN  hydrALAZINE Injectable 10 milliGRAM(s) IV Push every 30 minutes PRN  labetalol Injectable 20 milliGRAM(s) IV Push every 30 minutes PRN      REVIEW OF SYSTEMS  --------------------------------------------------------------------------------  Gen: No  fevers/chills  Skin: No rashes  Head/Eyes/Ears/Mouth: No headache; Normal hearing; Normal vision w/o blurriness  Respiratory: No dyspnea, cough, wheezing, hemoptysis  CV: No chest pain, PND, orthopnea  GI: No abdominal pain, diarrhea, constipation, nausea, vomiting  : No increased frequency, dysuria, hematuria, nocturia  MSK: No joint pain/swelling; no back pain; no edema  Neuro: No dizziness/lightheadedness, weakness, seizures, numbness, tingling      All other systems were reviewed and are negative, except as noted.    VITALS/PHYSICAL EXAM  --------------------------------------------------------------------------------  T(C): 36.6 (06-08-23 @ 11:20), Max: 36.6 (06-08-23 @ 11:20)  HR: 130 (06-08-23 @ 11:20) (52 - 130)  BP: 100/69 (06-08-23 @ 11:20) (100/69 - 178/84)  RR: 18 (06-08-23 @ 11:20) (15 - 18)  SpO2: 97% (06-08-23 @ 11:20) (97% - 100%)  Wt(kg): --  Height (cm): 172.7 (06-07-23 @ 11:21)  Weight (kg): 66.678 (06-07-23 @ 11:21)  BMI (kg/m2): 22.4 (06-07-23 @ 11:21)  BSA (m2): 1.79 (06-07-23 @ 11:21)      06-07-23 @ 07:01  -  06-08-23 @ 07:00  --------------------------------------------------------  IN: 240 mL / OUT: 0 mL / NET: 240 mL      Physical Exam:  	Gen: NAD  	HEENT: MMM  	Pulm: CTA B/L  	CV: S1S2  	Abd: Soft, +BS   	Ext: No LE edema B/L; b/l lower extremities wrapped  	Neuro: Awake  	Skin: Warm and dry    LABS/STUDIES  --------------------------------------------------------------------------------              10.7   10.49 >-----------<  159      [06-08-23 @ 06:09]              31.4     142  |  109  |  42  ----------------------------<  118      [06-08-23 @ 06:09]  5.4   |  19  |  2.50        Ca     9.0     [06-08-23 @ 06:09]      Mg     1.5     [06-07-23 @ 01:29]      Phos  3.2     [06-07-23 @ 01:29]      PT/INR: PT 13.1 , INR 1.14       [06-07-23 @ 01:29]  PTT: 31.0       [06-07-23 @ 01:29]      Creatinine Trend:  SCr 2.50 [06-08 @ 06:09]  SCr 2.32 [06-07 @ 01:29]  SCr 2.38 [06-06 @ 01:35]  SCr 2.89 [06-05 @ 06:39]  SCr 2.85 [06-04 @ 06:23]    Urinalysis - [06-04-23 @ 01:07]      Color Colorless / Appearance Clear / SG 1.011 / pH 6.0      Gluc Negative / Ketone Negative  / Bili Negative / Urobili Negative       Blood Negative / Protein Negative / Leuk Est Negative / Nitrite Negative      RBC  / WBC  / Hyaline  / Gran  / Sq Epi  / Non Sq Epi  / Bacteria       Iron 69, TIBC 219, %sat 31      [06-03-23 @ 07:02]  TSH 0.82      [06-03-23 @ 07:07]      
[facility] DIVISION OF KIDNEY DISEASES AND HYPERTENSION   FOLLOW UP NOTE  --------------------------------------------------------------------------------  Chief Complaint: PRIYA on CKD    24 hour events/subjective: Pt. was seen and examined today. He is "feeling fine". Denied shortness of breath, fevers, chills, nausea, vomiting, leg swelling or difficulty with urination.     PAST HISTORY  --------------------------------------------------------------------------------  No significant changes to PMH, PSH, FHx, SHx, unless otherwise noted    ALLERGIES & MEDICATIONS  --------------------------------------------------------------------------------  Allergies  penicillin (Unknown)  Intolerances    Standing Inpatient Medications  aspirin 325 milliGRAM(s) Oral daily  atorvastatin 40 milliGRAM(s) Oral at bedtime  chlorhexidine 2% Cloths 1 Application(s) Topical daily  cilostazol 100 milliGRAM(s) Oral two times a day  clopidogrel Tablet 75 milliGRAM(s) Oral daily  dextrose 5%. 1000 milliLiter(s) IV Continuous <Continuous>  dextrose 5%. 1000 milliLiter(s) IV Continuous <Continuous>  dextrose 50% Injectable 12.5 Gram(s) IV Push once  dextrose 50% Injectable 25 Gram(s) IV Push once  dextrose 50% Injectable 25 Gram(s) IV Push once  glucagon  Injectable 1 milliGRAM(s) IntraMuscular once  heparin   Injectable 5000 Unit(s) SubCutaneous every 8 hours  insulin lispro (ADMELOG) corrective regimen sliding scale   SubCutaneous at bedtime  insulin lispro (ADMELOG) corrective regimen sliding scale   SubCutaneous three times a day before meals  lactated ringers. 500 milliLiter(s) IV Continuous <Continuous>  metoprolol succinate ER 50 milliGRAM(s) Oral daily  sodium bicarbonate 1300 milliGRAM(s) Oral two times a day    PRN Inpatient Medications  dextrose Oral Gel 15 Gram(s) Oral once PRN    REVIEW OF SYSTEMS  --------------------------------------------------------------------------------  All other systems were reviewed and are negative, except as noted.    VITALS/PHYSICAL EXAM  --------------------------------------------------------------------------------  T(C): 36.7 (06-05-23 @ 11:44), Max: 36.7 (06-04-23 @ 20:14)  HR: 80 (06-05-23 @ 11:44) (72 - 84)  BP: 142/79 (06-05-23 @ 11:44) (142/79 - 156/81)  RR: 18 (06-05-23 @ 11:44) (18 - 18)  SpO2: 100% (06-05-23 @ 11:44) (98% - 100%)  Wt(kg): --    06-04-23 @ 07:01  -  06-05-23 @ 07:00  --------------------------------------------------------  IN: 1980 mL / OUT: 2200 mL / NET: -220 mL    Physical Exam:  	Gen: NAD  	HEENT: MMM  	Pulm: CTA B/L  	CV: S1S2  	Abd: Soft, +BS   	Ext: No LE edema B/L; b/l lower extremities wrapped  	Neuro: Awake  	Skin: Warm and dry    LABS/STUDIES  --------------------------------------------------------------------------------              10.8   6.03  >-----------<  170      [06-04-23 @ 06:25]              32.8     145  |  114  |  49  ----------------------------<  103      [06-05-23 @ 06:39]  4.6   |  19  |  2.89        Ca     8.7     [06-05-23 @ 06:39]    Creatinine Trend:  SCr 2.89 [06-05 @ 06:39]  SCr 2.85 [06-04 @ 06:23]  SCr 3.14 [06-03 @ 17:39]  SCr 3.00 [06-03 @ 07:07]  SCr 3.19 [06-02 @ 21:32]    Urinalysis - [06-04-23 @ 01:07]      Color Colorless / Appearance Clear / SG 1.011 / pH 6.0      Gluc Negative / Ketone Negative  / Bili Negative / Urobili Negative       Blood Negative / Protein Negative / Leuk Est Negative / Nitrite Negative      RBC  / WBC  / Hyaline  / Gran  / Sq Epi  / Non Sq Epi  / Bacteria
[facility] DIVISION OF KIDNEY DISEASES AND HYPERTENSION -- FOLLOW UP NOTE  --------------------------------------------------------------------------------  Chief Complaint:    24 hour events/subjective:  pt with improving renal function    PAST HISTORY  --------------------------------------------------------------------------------  No significant changes to PMH, PSH, FHx, SHx, unless otherwise noted    ALLERGIES & MEDICATIONS  --------------------------------------------------------------------------------  Allergies    penicillin (Unknown)    Intolerances      Standing Inpatient Medications  aspirin 325 milliGRAM(s) Oral daily  atorvastatin 40 milliGRAM(s) Oral at bedtime  chlorhexidine 2% Cloths 1 Application(s) Topical daily  cilostazol 100 milliGRAM(s) Oral two times a day  clopidogrel Tablet 75 milliGRAM(s) Oral daily  dextrose 5%. 1000 milliLiter(s) IV Continuous <Continuous>  dextrose 5%. 1000 milliLiter(s) IV Continuous <Continuous>  dextrose 50% Injectable 25 Gram(s) IV Push once  dextrose 50% Injectable 25 Gram(s) IV Push once  dextrose 50% Injectable 12.5 Gram(s) IV Push once  glucagon  Injectable 1 milliGRAM(s) IntraMuscular once  heparin   Injectable 5000 Unit(s) SubCutaneous every 8 hours  insulin lispro (ADMELOG) corrective regimen sliding scale   SubCutaneous at bedtime  insulin lispro (ADMELOG) corrective regimen sliding scale   SubCutaneous three times a day before meals  lactated ringers. 500 milliLiter(s) IV Continuous <Continuous>  magnesium sulfate  IVPB 2 Gram(s) IV Intermittent once  metoprolol succinate ER 50 milliGRAM(s) Oral daily  sodium bicarbonate 1300 milliGRAM(s) Oral two times a day    PRN Inpatient Medications  dextrose Oral Gel 15 Gram(s) Oral once PRN      REVIEW OF SYSTEMS  --------------------------------------------------------------------------------  Gen: No weight changes, fatigue, fevers/chills, weakness  Skin: No rashes  Head/Eyes/Ears/Mouth: No headache; Normal hearing; Normal vision w/o blurriness; No sinus pain/discomfort, sore throat  Respiratory: No dyspnea, cough, wheezing, hemoptysis  CV: No chest pain, PND, orthopnea  GI: No abdominal pain, diarrhea, constipation, nausea, vomiting, melena, hematochezia  : No increased frequency, dysuria, hematuria, nocturia  MSK: No joint pain/swelling; no back pain; no edema  Neuro: No dizziness/lightheadedness, weakness, seizures, numbness, tingling  Heme: No easy bruising or bleeding  Endo: No heat/cold intolerance  Psych: No significant nervousness, anxiety, stress, depression    All other systems were reviewed and are negative, except as noted.    VITALS/PHYSICAL EXAM  --------------------------------------------------------------------------------  T(C): 36.5 (06-06-23 @ 04:36), Max: 36.6 (06-05-23 @ 20:25)  HR: 86 (06-06-23 @ 04:36) (75 - 104)  BP: 160/91 (06-06-23 @ 04:36) (153/78 - 178/91)  RR: 18 (06-06-23 @ 04:36) (18 - 18)  SpO2: 100% (06-06-23 @ 04:36) (98% - 100%)  Wt(kg): --        06-05-23 @ 07:01  -  06-06-23 @ 07:00  --------------------------------------------------------  IN: 870 mL / OUT: 1860 mL / NET: -990 mL          LABS/STUDIES  --------------------------------------------------------------------------------              9.6    5.99  >-----------<  138      [06-06-23 @ 01:35]              28.4     144  |  113  |  39  ----------------------------<  160      [06-06-23 @ 01:35]  4.6   |  19  |  2.38        Ca     8.7     [06-06-23 @ 01:35]      Mg     1.3     [06-06-23 @ 01:35]      Phos  3.1     [06-06-23 @ 01:35]      PT/INR: PT 12.7 , INR 1.09       [06-06-23 @ 01:35]  PTT: 30.8       [06-06-23 @ 01:35]      Creatinine Trend:  SCr 2.38 [06-06 @ 01:35]  SCr 2.89 [06-05 @ 06:39]  SCr 2.85 [06-04 @ 06:23]  SCr 3.14 [06-03 @ 17:39]  SCr 3.00 [06-03 @ 07:07]    Urinalysis - [06-04-23 @ 01:07]      Color Colorless / Appearance Clear / SG 1.011 / pH 6.0      Gluc Negative / Ketone Negative  / Bili Negative / Urobili Negative       Blood Negative / Protein Negative / Leuk Est Negative / Nitrite Negative      RBC  / WBC  / Hyaline  / Gran  / Sq Epi  / Non Sq Epi  / Bacteria       Iron 69, TIBC 219, %sat 31      [06-03-23 @ 07:02]  Ferritin 401      [04-07-23 @ 05:55]  TSH 0.82      [06-03-23 @ 07:07]

## 2023-06-08 NOTE — PROGRESS NOTE ADULT - PROVIDER SPECIALTY LIST ADULT
Cardiology
Internal Medicine
Podiatry
Vascular Surgery
Cardiology
Cardiology
Internal Medicine
Nephrology
Podiatry
Vascular Surgery
Internal Medicine
Internal Medicine
Podiatry
Vascular Surgery
Vascular Surgery
Nephrology
Nephrology

## 2023-06-08 NOTE — PROGRESS NOTE ADULT - ATTENDING COMMENTS
I have seen this patient with the fellow and agree with their assessment and plan. I was physically present for significant portions of the evaluation and management (E/M) service provided.  I agree with the above history, physical, and plan which I have reviewed and edited where appropriate.    stable crt post procedure  ok to dc from renal standpt  Pt has a nephrologist in [place] he follows with    Mary Ureña MD  Pager   Office     Contact me directly via [employer] Teams     (After 5 pm or on weekends please page the on-call fellow/attending, can check [email or web address] for schedule. Login is keo newman, schedule under [facility] medicine, psych, derm)

## 2023-06-08 NOTE — DISCHARGE NOTE NURSING/CASE MANAGEMENT/SOCIAL WORK - NSDCFUADDAPPT_GEN_ALL_CORE_FT
APPTS ARE READY TO BE MADE: [X] YES    Best Family or Patient Contact (if needed):    Additional Information about above appointments (if needed):    1: Follow up with PCP Dr. Radha Henning   2: Follow up with podiatrist Dr. Hernandez within 1 week of discharge. Call [phone] to schedule   3:

## 2023-06-08 NOTE — PROGRESS NOTE ADULT - ASSESSMENT
72M with PMH of CKD4, PAD s/p stent, CAD, HTN, T2DM, smoker s/p R SFA stent (4/7), and L SFA stent (4/10), s/p b/l partial 1st and 2nd ray resection (4/12). Vascular surgery consulted in context of PAD s/p RLE angiogram w/ pop and PT angioplasty    Recommendations:  - Continue ASA, Plavix, Pletal, Statin   - Stable for discharge from vascular standpoint   - Please call back with questions or concerns   - Remainder care per primary       Vascular Surgery  [id number]

## 2023-06-08 NOTE — PROGRESS NOTE ADULT - PROBLEM SELECTOR PLAN 1
Pt. with PRIYA on CKD. Documented in chart that he has CKD in previous notes. No labs except during last admission on file. During last admission, Scr noted to be 2.2-2.7, discharged on 2.23 on 4/18/23. During last admission, renal sonogram without evidence of hydronephrosis. UA bland. Given gentle IVFs with improvement of Scr to 2.32 on 6/7/23. Scr with slight increase to 2.5 on 6/8 after the RLE angio procedure.  Agree with holding ACE/ARB at this time. Monitor for urinary retention. Monitor labs and urine output. Dose medications as per eGFR. Would hold angiogram till creatinine back to baseline    Low serum bicarb- Continue sodium bicarb 1300 BID. monitor serum bicarb.
Pt. with PRIYA on CKD. Documented in chart that he has CKD in previous notes. No labs except during last admission on file. During last admission, Scr noted to be 2.2-2.7, discharged on 2.23 on 4/18/23. During last admission, renal sonogram without evidence of hydronephrosis. UA bland. Given gentle IVFs with improvement of Scr to 2.8. Agree with holding ACEi/ARB at this time. Monitor for urinary retention. Monitor labs and urine output. Dose medications as per eGFR.     No renal contraindication for vascular angiogram to be done as needed for ongoing dx  Please continue fluids pre and post with LR or bicarb or NS of choice  Cont oral bicarb  dw with medicine
Pt. with PRIYA on CKD. Documented in chart that he has CKD in previous notes. No labs except during last admission on file. During last admission, Scr noted to be 2.2-2.7, discharged on 2.23 on 4/18/23. During last admission, renal sonogram without evidence of hydronephrosis. UA bland. Given gentle IVFs with improvement of Scr to 2.8. Agree with holding ACE/ARB at this time. Monitor for urinary retention. Monitor labs and urine output. Dose medications as per eGFR. Would hold angiogram till creatinine back to baseline    Low serum bicarb- Continue sodium bicarb 1300 BID. monitor serum bicarb.

## 2023-06-29 ENCOUNTER — APPOINTMENT (OUTPATIENT)
Dept: VASCULAR SURGERY | Facility: CLINIC | Age: 73
End: 2023-06-29

## 2024-08-21 NOTE — PHYSICAL THERAPY INITIAL EVALUATION ADULT - WEIGHT-BEARING RESTRICTIONS: SIT/STAND, REHAB EVAL
Notification: Patient discharged from [facility]. Post hospital follow up appointment has been scheduled for 8.20.24.  Please complete TCM services per protocol. Please remember to initiate the Transitional Care Management telephone encounter within 2 business days of discharge.    Date of Admission:  8.2.24  Date of Discharge:   8.9.24  Discharge Dx:   Right second toe and foot cellulitis and osteomyelitis  -Suspect secondary to complication from diabetes.  -s/p right partial second toe amputation 8/4   Hypertension  Hyperlipidemia   Hypothyroidism  Type 2 diabetes mellitus  BPH  Left internal iliac artery aneurysm  CAD with nonobstructive disease    [facility] ordered     Medication update per AVS:  START taking:   acetaminophen (TYLENOL)   cephalexin (KEFLEX)    ASK how to take:   benazepriL 40 mg tablet (LOTENSIN)   Invokana 100 mg tablet (canagliflozin)     Thank you,  Nadia Conde, STEPHANIEN, RN  [facility] Patient Care Coordinator   
Patient discharged from [facility]. Post hospital follow up appointment has been scheduled for 9/5/2024    Date of Admission:  8.2.24  Date of Discharge:   8.9.24    Unable to perform TCM call d/t date of discharge   
WBAT heel weight bearing in bilateral darco shoes/weight-bearing as tolerated

## (undated) DEVICE — TORQUE DEVICE FOR GUIDEWIRE 0.0100.038"

## (undated) DEVICE — DRAPE IOBAN 23" X 23"

## (undated) DEVICE — GLV 7.5 PROTEXIS (WHITE)

## (undated) DEVICE — TUBING HIGH POWER CONTRAST INJ

## (undated) DEVICE — TAPE GLO-N-TELL RADIOPAQUE 20 STRIPS

## (undated) DEVICE — BLADE SCALPEL SAFETYLOCK #10

## (undated) DEVICE — SAW BLADE MICROAIRE OSCILATING 25.4MM X 90MM X 1.27MM

## (undated) DEVICE — POSITIONER FOAM EGG CRATE ULNAR 2PCS (PINK)

## (undated) DEVICE — DRSG STOCKINETTE IMPERVIOUS MED

## (undated) DEVICE — BLADE SCALPEL SAFETYLOCK #11

## (undated) DEVICE — SAW BLADE MICROAIRE SAGITTAL 9.4MMX25.4MMX0.6MM

## (undated) DEVICE — DRAPE CAMERA VIDEO 7"X96"

## (undated) DEVICE — SPECIMEN CONTAINER 100ML

## (undated) DEVICE — LAP PAD 18 X 18"

## (undated) DEVICE — DRAPE INSTRUMENT POUCH 6.75" X 11"

## (undated) DEVICE — DRAPE MAYO STAND 30"

## (undated) DEVICE — SOL IRR POUR NS 0.9% 500ML

## (undated) DEVICE — WARMING BLANKET UPPER ADULT

## (undated) DEVICE — FOLEY TRAY 16FR 5CC LTX UMETER CLOSED

## (undated) DEVICE — NDL PERC BASEPLT 18GX7CM

## (undated) DEVICE — PACKING GAUZE IODOFORM 2"

## (undated) DEVICE — DRAPE TOWEL BLUE 17" X 24"

## (undated) DEVICE — SUT POLYSORB 2-0 30" GS-21 UNDYED

## (undated) DEVICE — NDL HYPO REGULAR BEVEL 25G X 1.5" (BLUE)

## (undated) DEVICE — DRSG STERISTRIPS 0.5 X 4"

## (undated) DEVICE — DRAPE MAGNETIC INSTRUMENT MEDIUM

## (undated) DEVICE — DRSG TEGADERM 6"X8"

## (undated) DEVICE — MARKING PEN W RULER

## (undated) DEVICE — DRAPE EQUIPMENT BANDED BAG 30 X 30" (SHOWER CAP)

## (undated) DEVICE — DRAPE LIGHT HANDLE COVER (BLUE)

## (undated) DEVICE — SUT PLAIN GUT 4-0 18" P-12

## (undated) DEVICE — DRSG OPSITE 13.75 X 4"

## (undated) DEVICE — NDL COUNTER FOAM AND MAGNET 40-70

## (undated) DEVICE — NDL ENTRY PERC MCKNIGHT 18G

## (undated) DEVICE — SOL IRR POUR H2O 250ML

## (undated) DEVICE — SOL IRR BAG NS 0.9% 3000ML

## (undated) DEVICE — GLV 8 PROTEXIS (WHITE)

## (undated) DEVICE — PACKING GAUZE PLAIN 2"

## (undated) DEVICE — DRAPE 3/4 SHEET W REINFORCEMENT 56X77"

## (undated) DEVICE — DRAPE ISOLATION BAG 20X20"

## (undated) DEVICE — BLADE SCALPEL SAFETYLOCK #15

## (undated) DEVICE — SYR LUER LOK 10CC

## (undated) DEVICE — SUT BIOSYN 4-0 18" P-12

## (undated) DEVICE — DRSG COMBINE 5X9"

## (undated) DEVICE — SUCTION YANKAUER NO CONTROL VENT

## (undated) DEVICE — STRYKER INTERPULSE HANDPIECE W IRR SUCTION TUBE

## (undated) DEVICE — CONN DUAL HOSE

## (undated) DEVICE — GOWN TRIMAX XXL

## (undated) DEVICE — MEDICATION LABELS W MARKER

## (undated) DEVICE — DRSG STOCKINETTE IMPERVIOUS XL

## (undated) DEVICE — PACK BASIN SPECIAL PROCEDURE

## (undated) DEVICE — Device

## (undated) DEVICE — GOWN TRIMAX LG

## (undated) DEVICE — DRAPE 1/2 SHEET 40X57"

## (undated) DEVICE — DRAPE FEMORAL ANGIOGRAPHY W TROUGH

## (undated) DEVICE — STAPLER SKIN VISI-STAT 35 WIDE

## (undated) DEVICE — PACKING GAUZE PLAIN 0.5"

## (undated) DEVICE — VENODYNE/SCD SLEEVE CALF LARGE

## (undated) DEVICE — SAW BLADE MICROAIRE SAGITTAL 5.8X25.4X0.6 MM

## (undated) DEVICE — DRSG ACE BANDAGE 6"

## (undated) DEVICE — DRSG KLING 4"

## (undated) DEVICE — SYR LUER LOK 20CC

## (undated) DEVICE — GLV 6.5 PROTEXIS (WHITE)

## (undated) DEVICE — VISITEC 4X4

## (undated) DEVICE — INFLATION DEVICE BASIXCOMPAK

## (undated) DEVICE — GLV 7 PROTEXIS (WHITE)

## (undated) DEVICE — PACK GENERAL MINOR

## (undated) DEVICE — PACK EXTREMITY

## (undated) DEVICE — PREP CHLORAPREP HI-LITE ORANGE 26ML

## (undated) DEVICE — BUR STRYKER OVAL SOLID CARBIDE MED 4MM

## (undated) DEVICE — ELCTR BOVIE PENCIL HANDPIECE

## (undated) DEVICE — PREP BETADINE KIT

## (undated) DEVICE — DRSG XEROFORM 1 X 8"